# Patient Record
Sex: FEMALE | Race: WHITE | NOT HISPANIC OR LATINO | Employment: OTHER | URBAN - METROPOLITAN AREA
[De-identification: names, ages, dates, MRNs, and addresses within clinical notes are randomized per-mention and may not be internally consistent; named-entity substitution may affect disease eponyms.]

---

## 2017-09-07 ENCOUNTER — TRANSCRIBE ORDERS (OUTPATIENT)
Dept: ADMINISTRATIVE | Facility: HOSPITAL | Age: 62
End: 2017-09-07

## 2017-09-07 DIAGNOSIS — Z13.820 SCREENING FOR OSTEOPOROSIS: Primary | ICD-10-CM

## 2017-09-26 ENCOUNTER — HOSPITAL ENCOUNTER (OUTPATIENT)
Dept: BONE DENSITY | Facility: HOSPITAL | Age: 62
Discharge: HOME | End: 2017-09-26
Payer: COMMERCIAL

## 2017-09-26 DIAGNOSIS — Z13.820 SCREENING FOR OSTEOPOROSIS: ICD-10-CM

## 2017-09-26 PROCEDURE — 77080 DXA BONE DENSITY AXIAL: CPT

## 2017-10-23 ENCOUNTER — TRANSCRIBE ORDERS (OUTPATIENT)
Dept: ADMINISTRATIVE | Facility: HOSPITAL | Age: 62
End: 2017-10-23

## 2017-10-23 DIAGNOSIS — Z12.39 SCREENING BREAST EXAMINATION: Primary | ICD-10-CM

## 2017-11-13 ENCOUNTER — HOSPITAL ENCOUNTER (OUTPATIENT)
Dept: MAMMOGRAPHY | Facility: HOSPITAL | Age: 62
Discharge: HOME | End: 2017-11-13
Payer: COMMERCIAL

## 2017-11-13 DIAGNOSIS — Z12.39 SCREENING BREAST EXAMINATION: ICD-10-CM

## 2017-11-13 PROCEDURE — G0202 SCR MAMMO BI INCL CAD: HCPCS

## 2017-11-13 PROCEDURE — 77063 BREAST TOMOSYNTHESIS BI: CPT

## 2017-11-26 ENCOUNTER — APPOINTMENT (OUTPATIENT)
Dept: LAB | Facility: HOSPITAL | Age: 62
End: 2017-11-26
Payer: COMMERCIAL

## 2017-11-26 ENCOUNTER — TRANSCRIBE ORDERS (OUTPATIENT)
Dept: ADMINISTRATIVE | Facility: HOSPITAL | Age: 62
End: 2017-11-26

## 2017-11-26 DIAGNOSIS — E78.00 PURE HYPERCHOLESTEROLEMIA: ICD-10-CM

## 2017-11-26 LAB
ALBUMIN SERPL BCP-MCNC: 3.8 G/DL (ref 3.5–5)
ANION GAP SERPL CALCULATED.3IONS-SCNC: 11 MMOL/L (ref 4–13)
BUN SERPL-MCNC: 15 MG/DL (ref 5–25)
CALCIUM SERPL-MCNC: 9.1 MG/DL (ref 8.3–10.1)
CHLORIDE SERPL-SCNC: 101 MMOL/L (ref 100–108)
CHOLEST SERPL-MCNC: 234 MG/DL (ref 50–200)
CO2 SERPL-SCNC: 28 MMOL/L (ref 21–32)
CREAT SERPL-MCNC: 0.71 MG/DL (ref 0.6–1.3)
GFR SERPL CREATININE-BSD FRML MDRD: 92 ML/MIN/1.73SQ M
GLUCOSE P FAST SERPL-MCNC: 97 MG/DL (ref 65–99)
HDLC SERPL-MCNC: 42 MG/DL (ref 40–60)
LDLC SERPL CALC-MCNC: 131 MG/DL (ref 0–100)
PHOSPHATE SERPL-MCNC: 3.9 MG/DL (ref 2.3–4.1)
POTASSIUM SERPL-SCNC: 4.1 MMOL/L (ref 3.5–5.3)
SODIUM SERPL-SCNC: 140 MMOL/L (ref 136–145)
TRIGL SERPL-MCNC: 307 MG/DL

## 2017-11-26 PROCEDURE — 36415 COLL VENOUS BLD VENIPUNCTURE: CPT

## 2017-11-26 PROCEDURE — 80069 RENAL FUNCTION PANEL: CPT

## 2017-11-26 PROCEDURE — 80061 LIPID PANEL: CPT

## 2018-11-12 ENCOUNTER — TRANSCRIBE ORDERS (OUTPATIENT)
Dept: ADMINISTRATIVE | Facility: HOSPITAL | Age: 63
End: 2018-11-12

## 2018-11-12 DIAGNOSIS — Z12.39 BREAST SCREENING: Primary | ICD-10-CM

## 2018-11-29 ENCOUNTER — HOSPITAL ENCOUNTER (OUTPATIENT)
Dept: MAMMOGRAPHY | Facility: HOSPITAL | Age: 63
Discharge: HOME | End: 2018-11-29
Payer: COMMERCIAL

## 2018-11-29 DIAGNOSIS — Z12.39 BREAST SCREENING: ICD-10-CM

## 2018-11-29 PROCEDURE — 77063 BREAST TOMOSYNTHESIS BI: CPT

## 2018-11-29 PROCEDURE — 77067 SCR MAMMO BI INCL CAD: CPT

## 2018-12-21 ENCOUNTER — APPOINTMENT (OUTPATIENT)
Dept: LAB | Facility: CLINIC | Age: 63
End: 2018-12-21
Payer: COMMERCIAL

## 2018-12-21 ENCOUNTER — TRANSCRIBE ORDERS (OUTPATIENT)
Dept: LAB | Facility: CLINIC | Age: 63
End: 2018-12-21

## 2018-12-21 DIAGNOSIS — Z00.00 ROUTINE GENERAL MEDICAL EXAMINATION AT A HEALTH CARE FACILITY: Primary | ICD-10-CM

## 2018-12-21 DIAGNOSIS — Z00.00 ROUTINE GENERAL MEDICAL EXAMINATION AT A HEALTH CARE FACILITY: ICD-10-CM

## 2018-12-21 LAB
ALBUMIN SERPL BCP-MCNC: 3.5 G/DL (ref 3.5–5)
ANION GAP SERPL CALCULATED.3IONS-SCNC: 7 MMOL/L (ref 4–13)
BUN SERPL-MCNC: 15 MG/DL (ref 5–25)
CALCIUM SERPL-MCNC: 8.7 MG/DL (ref 8.3–10.1)
CHLORIDE SERPL-SCNC: 102 MMOL/L (ref 100–108)
CO2 SERPL-SCNC: 22 MMOL/L (ref 21–32)
CREAT SERPL-MCNC: 0.68 MG/DL (ref 0.6–1.3)
GFR SERPL CREATININE-BSD FRML MDRD: 94 ML/MIN/1.73SQ M
GLUCOSE P FAST SERPL-MCNC: 97 MG/DL (ref 65–99)
PHOSPHATE SERPL-MCNC: 4.2 MG/DL (ref 2.3–4.1)
POTASSIUM SERPL-SCNC: 5.2 MMOL/L (ref 3.5–5.3)
SODIUM SERPL-SCNC: 131 MMOL/L (ref 136–145)

## 2018-12-21 PROCEDURE — 80069 RENAL FUNCTION PANEL: CPT

## 2018-12-21 PROCEDURE — 36415 COLL VENOUS BLD VENIPUNCTURE: CPT

## 2018-12-24 ENCOUNTER — TRANSCRIBE ORDERS (OUTPATIENT)
Dept: LAB | Facility: CLINIC | Age: 63
End: 2018-12-24

## 2018-12-24 ENCOUNTER — APPOINTMENT (OUTPATIENT)
Dept: LAB | Facility: CLINIC | Age: 63
End: 2018-12-24
Payer: COMMERCIAL

## 2018-12-24 DIAGNOSIS — Z00.00 ROUTINE GENERAL MEDICAL EXAMINATION AT A HEALTH CARE FACILITY: Primary | ICD-10-CM

## 2018-12-24 DIAGNOSIS — Z00.00 ROUTINE GENERAL MEDICAL EXAMINATION AT A HEALTH CARE FACILITY: ICD-10-CM

## 2018-12-24 PROCEDURE — 36415 COLL VENOUS BLD VENIPUNCTURE: CPT

## 2018-12-24 PROCEDURE — 80061 LIPID PANEL: CPT

## 2018-12-26 LAB
CHOLEST SERPL-MCNC: 264 MG/DL (ref 50–200)
HDLC SERPL-MCNC: 38 MG/DL (ref 40–60)
LDLC SERPL CALC-MCNC: 161 MG/DL (ref 0–100)
NONHDLC SERPL-MCNC: 226 MG/DL
TRIGL SERPL-MCNC: 326 MG/DL

## 2019-07-10 ENCOUNTER — TELEPHONE (OUTPATIENT)
Dept: FAMILY MEDICINE CLINIC | Facility: CLINIC | Age: 64
End: 2019-07-10

## 2019-07-14 DIAGNOSIS — F33.42 RECURRENT MAJOR DEPRESSIVE DISORDER, IN FULL REMISSION (HCC): Primary | ICD-10-CM

## 2019-07-14 RX ORDER — CITALOPRAM 20 MG/1
20 TABLET ORAL DAILY
Qty: 30 TABLET | Refills: 5 | Status: SHIPPED | OUTPATIENT
Start: 2019-07-14 | End: 2020-01-02 | Stop reason: SDUPTHER

## 2019-08-02 RX ORDER — HYDROXYZINE 50 MG/1
1 TABLET, FILM COATED ORAL DAILY
COMMUNITY
Start: 2019-07-03 | End: 2020-04-20 | Stop reason: ALTCHOICE

## 2019-08-02 RX ORDER — LOSARTAN POTASSIUM 100 MG/1
1 TABLET ORAL DAILY
COMMUNITY
Start: 2019-07-03 | End: 2019-11-25 | Stop reason: SDUPTHER

## 2019-08-02 RX ORDER — ATORVASTATIN CALCIUM 10 MG/1
1 TABLET, FILM COATED ORAL DAILY
COMMUNITY
Start: 2019-06-04 | End: 2019-08-07 | Stop reason: ALTCHOICE

## 2019-08-02 RX ORDER — TRIAMTERENE AND HYDROCHLOROTHIAZIDE 75; 50 MG/1; MG/1
1 TABLET ORAL DAILY
COMMUNITY
Start: 2019-07-03 | End: 2019-08-07 | Stop reason: SDUPTHER

## 2019-08-07 ENCOUNTER — OFFICE VISIT (OUTPATIENT)
Dept: FAMILY MEDICINE CLINIC | Facility: CLINIC | Age: 64
End: 2019-08-07
Payer: COMMERCIAL

## 2019-08-07 VITALS
SYSTOLIC BLOOD PRESSURE: 124 MMHG | DIASTOLIC BLOOD PRESSURE: 74 MMHG | WEIGHT: 198 LBS | BODY MASS INDEX: 35.08 KG/M2 | HEIGHT: 63 IN

## 2019-08-07 DIAGNOSIS — J30.1 SEASONAL ALLERGIC RHINITIS DUE TO POLLEN: Chronic | ICD-10-CM

## 2019-08-07 DIAGNOSIS — I10 ESSENTIAL HYPERTENSION: ICD-10-CM

## 2019-08-07 DIAGNOSIS — N39.3 STRESS INCONTINENCE OF URINE: Chronic | ICD-10-CM

## 2019-08-07 DIAGNOSIS — E78.00 HYPERCHOLESTEROLEMIA: Primary | ICD-10-CM

## 2019-08-07 DIAGNOSIS — K21.00 GASTRO-ESOPHAGEAL REFLUX DISEASE WITH ESOPHAGITIS: Chronic | ICD-10-CM

## 2019-08-07 DIAGNOSIS — F41.1 GENERALIZED ANXIETY DISORDER: Chronic | ICD-10-CM

## 2019-08-07 PROCEDURE — 3074F SYST BP LT 130 MM HG: CPT | Performed by: FAMILY MEDICINE

## 2019-08-07 PROCEDURE — 99214 OFFICE O/P EST MOD 30 MIN: CPT | Performed by: FAMILY MEDICINE

## 2019-08-07 PROCEDURE — 3008F BODY MASS INDEX DOCD: CPT | Performed by: FAMILY MEDICINE

## 2019-08-07 RX ORDER — ATORVASTATIN CALCIUM 10 MG/1
10 TABLET, FILM COATED ORAL DAILY
COMMUNITY
End: 2019-08-07 | Stop reason: SDUPTHER

## 2019-08-07 RX ORDER — ATORVASTATIN CALCIUM 10 MG/1
10 TABLET, FILM COATED ORAL DAILY
Qty: 30 TABLET | Refills: 5 | Status: SHIPPED | OUTPATIENT
Start: 2019-08-07 | End: 2020-04-20 | Stop reason: ALTCHOICE

## 2019-08-07 RX ORDER — TRIAMTERENE AND HYDROCHLOROTHIAZIDE 75; 50 MG/1; MG/1
1 TABLET ORAL DAILY
Qty: 30 TABLET | Refills: 5 | Status: SHIPPED | OUTPATIENT
Start: 2019-08-07 | End: 2020-04-01 | Stop reason: SDUPTHER

## 2019-08-07 NOTE — PATIENT INSTRUCTIONS
Overall seems to be doing well has been concerned because is now starting to get some macular degeneration although it does not seem to be having much effect on her eyes  Patient is going to restart the Lipitor and may continue the fish oil which may help the triglycerides  Will continue all meds as is and recheck here in 4 months

## 2019-08-07 NOTE — PROGRESS NOTES
Assessment/Plan:    No problem-specific Assessment & Plan notes found for this encounter  Diagnoses and all orders for this visit:    Hypercholesterolemia  Comments:  Reviewed labs levels are up should restart the Lipitor and may continue the fish oil if desired will need to repeat labs after next visit  Orders:  -     atorvastatin (LIPITOR) 10 mg tablet; Take 1 tablet (10 mg total) by mouth daily    Essential hypertension  Comments:  Doing well continue current regimen watch salt in diet and push for weight loss  Orders:  -     triamterene-hydrochlorothiazide (MAXZIDE) 75-50 MG per tablet; Take 1 tablet by mouth daily    Seasonal allergic rhinitis due to pollen  Comments:  Doing well continue p r n  Meds    Stress incontinence of urine  Comments:  Doing well with use of the Kegel exercises  Should continue on a daily basis    Generalized anxiety disorder  Comments:  Overall doing well continue current regimen    Gastro-esophageal reflux disease with esophagitis  Comments:  Doing well continue p r n  Meds    Other orders  -     Discontinue: atorvastatin (LIPITOR) 10 mg tablet; Take 1 tablet by mouth daily  -     hydrOXYzine HCL (ATARAX) 50 mg tablet; Take 1 tablet by mouth daily  -     losartan (COZAAR) 100 MG tablet; Take 1 tablet by mouth daily  -     Discontinue: triamterene-hydrochlorothiazide (MAXZIDE) 75-50 MG per tablet; Take 1 tablet by mouth daily  -     Discontinue: atorvastatin (LIPITOR) 10 mg tablet; Take 10 mg by mouth daily          Subjective:      Patient ID: Dalia Bal is a 62 y o  female  Patient has history of hypertension, reflux esophagitis, allergic rhinitis, urinary incontinence, stress disorder and hypercholesterolemia  Has not been taking her cholesterol medication but has been taking fish oil  Overall the stresses been doing well and is doing well with urinary incontinence    No trouble with heartburn allergies doing okay      The following portions of the patient's history were reviewed and updated as appropriate: allergies, current medications, past family history, past medical history, past social history, past surgical history and problem list     Review of Systems   Constitutional: Negative for activity change, appetite change, chills, fatigue, fever and unexpected weight change  HENT: Positive for congestion  Negative for dental problem, hearing loss, rhinorrhea, trouble swallowing and voice change  Eyes: Negative for visual disturbance  Respiratory: Negative for apnea, cough, chest tightness and shortness of breath  Cardiovascular: Negative for chest pain, palpitations and leg swelling  Gastrointestinal: Negative for abdominal distention, abdominal pain, constipation and diarrhea  Endocrine: Negative for polyuria  Genitourinary: Negative for difficulty urinating and enuresis  Musculoskeletal: Negative for arthralgias and myalgias  Skin: Negative for rash  Allergic/Immunologic: Positive for environmental allergies  Neurological: Negative for dizziness, weakness, light-headedness, numbness and headaches  Hematological: Negative for adenopathy  Psychiatric/Behavioral: Negative for agitation and confusion  Objective:      /74 (BP Location: Left arm, Patient Position: Sitting, Cuff Size: Large)   Ht 5' 3" (1 6 m)   Wt 89 8 kg (198 lb)   BMI 35 07 kg/m²          Physical Exam   Constitutional: She appears well-developed and well-nourished  HENT:   Head: Normocephalic  Nose: Nose normal    Mouth/Throat: Oropharynx is clear and moist    Eyes: Conjunctivae are normal    Neck: Neck supple  No thyromegaly present  Cardiovascular: Normal rate, regular rhythm and normal heart sounds  No murmur heard  Pulmonary/Chest: Effort normal and breath sounds normal    Abdominal: Soft  She exhibits no distension and no mass  There is no tenderness  Musculoskeletal: She exhibits no edema  Lymphadenopathy:     She has no cervical adenopathy  Neurological: She is alert  She displays normal reflexes  Coordination normal    Skin: Skin is warm and dry  Psychiatric: She has a normal mood and affect  Nursing note and vitals reviewed

## 2019-08-08 PROBLEM — N39.3 STRESS INCONTINENCE OF URINE: Chronic | Status: ACTIVE | Noted: 2019-08-08

## 2019-08-08 PROBLEM — E78.00 HYPERCHOLESTEROLEMIA: Chronic | Status: ACTIVE | Noted: 2019-08-08

## 2019-08-08 PROBLEM — F41.1 GENERALIZED ANXIETY DISORDER: Chronic | Status: ACTIVE | Noted: 2019-08-08

## 2019-08-08 PROBLEM — I10 ESSENTIAL HYPERTENSION: Chronic | Status: ACTIVE | Noted: 2019-08-08

## 2019-08-08 PROBLEM — K21.00 GASTRO-ESOPHAGEAL REFLUX DISEASE WITH ESOPHAGITIS: Chronic | Status: ACTIVE | Noted: 2019-08-08

## 2019-08-08 PROBLEM — J30.1 SEASONAL ALLERGIC RHINITIS DUE TO POLLEN: Chronic | Status: ACTIVE | Noted: 2019-08-08

## 2019-09-10 ENCOUNTER — TRANSCRIBE ORDERS (OUTPATIENT)
Dept: ADMINISTRATIVE | Facility: HOSPITAL | Age: 64
End: 2019-09-10

## 2019-09-10 DIAGNOSIS — Z12.31 SCREENING MAMMOGRAM FOR HIGH-RISK PATIENT: Primary | ICD-10-CM

## 2019-11-03 ENCOUNTER — APPOINTMENT (EMERGENCY)
Dept: RADIOLOGY | Facility: HOSPITAL | Age: 64
End: 2019-11-03
Payer: COMMERCIAL

## 2019-11-03 ENCOUNTER — HOSPITAL ENCOUNTER (OUTPATIENT)
Facility: HOSPITAL | Age: 64
Setting detail: OBSERVATION
Discharge: HOME | End: 2019-11-04
Attending: EMERGENCY MEDICINE | Admitting: FAMILY MEDICINE
Payer: COMMERCIAL

## 2019-11-03 ENCOUNTER — APPOINTMENT (EMERGENCY)
Dept: CT IMAGING | Facility: HOSPITAL | Age: 64
End: 2019-11-03
Payer: COMMERCIAL

## 2019-11-03 DIAGNOSIS — R26.2 AMBULATORY DYSFUNCTION: Primary | ICD-10-CM

## 2019-11-03 DIAGNOSIS — M25.552 ACUTE HIP PAIN, LEFT: ICD-10-CM

## 2019-11-03 DIAGNOSIS — R10.819 ABDOMINAL TENDERNESS: ICD-10-CM

## 2019-11-03 LAB
ALBUMIN SERPL BCP-MCNC: 3.8 G/DL (ref 3.5–5)
ALP SERPL-CCNC: 89 U/L (ref 46–116)
ALT SERPL W P-5'-P-CCNC: 39 U/L (ref 12–78)
ANION GAP SERPL CALCULATED.3IONS-SCNC: 10 MMOL/L (ref 4–13)
AST SERPL W P-5'-P-CCNC: 20 U/L (ref 5–45)
BASOPHILS # BLD AUTO: 0.12 THOUSANDS/ΜL (ref 0–0.1)
BASOPHILS NFR BLD AUTO: 1 % (ref 0–1)
BILIRUB SERPL-MCNC: 0.5 MG/DL (ref 0.2–1)
BILIRUB UR QL STRIP: NEGATIVE
BUN SERPL-MCNC: 14 MG/DL (ref 5–25)
CALCIUM SERPL-MCNC: 8.7 MG/DL (ref 8.3–10.1)
CHLORIDE SERPL-SCNC: 100 MMOL/L (ref 100–108)
CLARITY UR: CLEAR
CO2 SERPL-SCNC: 27 MMOL/L (ref 21–32)
COLOR UR: YELLOW
CREAT SERPL-MCNC: 0.77 MG/DL (ref 0.6–1.3)
EOSINOPHIL # BLD AUTO: 0.29 THOUSAND/ΜL (ref 0–0.61)
EOSINOPHIL NFR BLD AUTO: 2 % (ref 0–6)
ERYTHROCYTE [DISTWIDTH] IN BLOOD BY AUTOMATED COUNT: 13.9 % (ref 11.6–15.1)
GFR SERPL CREATININE-BSD FRML MDRD: 82 ML/MIN/1.73SQ M
GLUCOSE SERPL-MCNC: 115 MG/DL (ref 65–140)
GLUCOSE UR STRIP-MCNC: NEGATIVE MG/DL
HCT VFR BLD AUTO: 39.3 % (ref 34.8–46.1)
HGB BLD-MCNC: 13.1 G/DL (ref 11.5–15.4)
HGB UR QL STRIP.AUTO: NEGATIVE
IMM GRANULOCYTES # BLD AUTO: 0.06 THOUSAND/UL (ref 0–0.2)
IMM GRANULOCYTES NFR BLD AUTO: 1 % (ref 0–2)
KETONES UR STRIP-MCNC: NEGATIVE MG/DL
LEUKOCYTE ESTERASE UR QL STRIP: NEGATIVE
LIPASE SERPL-CCNC: 110 U/L (ref 73–393)
LYMPHOCYTES # BLD AUTO: 2.78 THOUSANDS/ΜL (ref 0.6–4.47)
LYMPHOCYTES NFR BLD AUTO: 23 % (ref 14–44)
MCH RBC QN AUTO: 29.6 PG (ref 26.8–34.3)
MCHC RBC AUTO-ENTMCNC: 33.3 G/DL (ref 31.4–37.4)
MCV RBC AUTO: 89 FL (ref 82–98)
MONOCYTES # BLD AUTO: 0.55 THOUSAND/ΜL (ref 0.17–1.22)
MONOCYTES NFR BLD AUTO: 4 % (ref 4–12)
NEUTROPHILS # BLD AUTO: 8.57 THOUSANDS/ΜL (ref 1.85–7.62)
NEUTS SEG NFR BLD AUTO: 69 % (ref 43–75)
NITRITE UR QL STRIP: NEGATIVE
NRBC BLD AUTO-RTO: 0 /100 WBCS
PH UR STRIP.AUTO: 6.5 [PH]
PLATELET # BLD AUTO: 351 THOUSANDS/UL (ref 149–390)
PLATELET # BLD AUTO: 358 THOUSANDS/UL (ref 149–390)
PMV BLD AUTO: 10.4 FL (ref 8.9–12.7)
PMV BLD AUTO: 10.6 FL (ref 8.9–12.7)
POTASSIUM SERPL-SCNC: 3.5 MMOL/L (ref 3.5–5.3)
PROT SERPL-MCNC: 7.5 G/DL (ref 6.4–8.2)
PROT UR STRIP-MCNC: NEGATIVE MG/DL
RBC # BLD AUTO: 4.43 MILLION/UL (ref 3.81–5.12)
SODIUM SERPL-SCNC: 137 MMOL/L (ref 136–145)
SP GR UR STRIP.AUTO: <=1.005 (ref 1–1.03)
TROPONIN I SERPL-MCNC: <0.02 NG/ML
UROBILINOGEN UR QL STRIP.AUTO: 0.2 E.U./DL
WBC # BLD AUTO: 12.37 THOUSAND/UL (ref 4.31–10.16)

## 2019-11-03 PROCEDURE — 96376 TX/PRO/DX INJ SAME DRUG ADON: CPT

## 2019-11-03 PROCEDURE — 99219 PR INITIAL OBSERVATION CARE/DAY 50 MINUTES: CPT | Performed by: NURSE PRACTITIONER

## 2019-11-03 PROCEDURE — 51798 US URINE CAPACITY MEASURE: CPT

## 2019-11-03 PROCEDURE — 84484 ASSAY OF TROPONIN QUANT: CPT | Performed by: EMERGENCY MEDICINE

## 2019-11-03 PROCEDURE — 80053 COMPREHEN METABOLIC PANEL: CPT | Performed by: EMERGENCY MEDICINE

## 2019-11-03 PROCEDURE — 96375 TX/PRO/DX INJ NEW DRUG ADDON: CPT

## 2019-11-03 PROCEDURE — 36415 COLL VENOUS BLD VENIPUNCTURE: CPT | Performed by: EMERGENCY MEDICINE

## 2019-11-03 PROCEDURE — 93005 ELECTROCARDIOGRAM TRACING: CPT

## 2019-11-03 PROCEDURE — 99285 EMERGENCY DEPT VISIT HI MDM: CPT | Performed by: EMERGENCY MEDICINE

## 2019-11-03 PROCEDURE — 99285 EMERGENCY DEPT VISIT HI MDM: CPT

## 2019-11-03 PROCEDURE — 85025 COMPLETE CBC W/AUTO DIFF WBC: CPT | Performed by: EMERGENCY MEDICINE

## 2019-11-03 PROCEDURE — 85049 AUTOMATED PLATELET COUNT: CPT | Performed by: NURSE PRACTITIONER

## 2019-11-03 PROCEDURE — 71045 X-RAY EXAM CHEST 1 VIEW: CPT

## 2019-11-03 PROCEDURE — 83690 ASSAY OF LIPASE: CPT | Performed by: EMERGENCY MEDICINE

## 2019-11-03 PROCEDURE — 81003 URINALYSIS AUTO W/O SCOPE: CPT | Performed by: EMERGENCY MEDICINE

## 2019-11-03 PROCEDURE — 96374 THER/PROPH/DIAG INJ IV PUSH: CPT

## 2019-11-03 PROCEDURE — 74177 CT ABD & PELVIS W/CONTRAST: CPT

## 2019-11-03 RX ORDER — DIPHENOXYLATE HYDROCHLORIDE AND ATROPINE SULFATE 2.5; .025 MG/1; MG/1
1 TABLET ORAL DAILY
COMMUNITY

## 2019-11-03 RX ORDER — NICOTINE 21 MG/24HR
1 PATCH, TRANSDERMAL 24 HOURS TRANSDERMAL DAILY
Status: DISCONTINUED | OUTPATIENT
Start: 2019-11-03 | End: 2019-11-04 | Stop reason: HOSPADM

## 2019-11-03 RX ORDER — HYDROMORPHONE HCL/PF 1 MG/ML
0.5 SYRINGE (ML) INJECTION EVERY 4 HOURS PRN
Status: DISCONTINUED | OUTPATIENT
Start: 2019-11-03 | End: 2019-11-04 | Stop reason: HOSPADM

## 2019-11-03 RX ORDER — FENTANYL CITRATE 50 UG/ML
50 INJECTION, SOLUTION INTRAMUSCULAR; INTRAVENOUS ONCE
Status: COMPLETED | OUTPATIENT
Start: 2019-11-03 | End: 2019-11-03

## 2019-11-03 RX ORDER — LOSARTAN POTASSIUM 50 MG/1
100 TABLET ORAL DAILY
Status: DISCONTINUED | OUTPATIENT
Start: 2019-11-03 | End: 2019-11-04 | Stop reason: HOSPADM

## 2019-11-03 RX ORDER — TRIAMTERENE AND HYDROCHLOROTHIAZIDE 37.5; 25 MG/1; MG/1
2 TABLET ORAL DAILY
Status: DISCONTINUED | OUTPATIENT
Start: 2019-11-03 | End: 2019-11-04 | Stop reason: HOSPADM

## 2019-11-03 RX ORDER — KETOROLAC TROMETHAMINE 30 MG/ML
15 INJECTION, SOLUTION INTRAMUSCULAR; INTRAVENOUS EVERY 6 HOURS SCHEDULED
Status: COMPLETED | OUTPATIENT
Start: 2019-11-03 | End: 2019-11-04

## 2019-11-03 RX ORDER — LIDOCAINE 50 MG/G
1 PATCH TOPICAL ONCE
Status: COMPLETED | OUTPATIENT
Start: 2019-11-03 | End: 2019-11-03

## 2019-11-03 RX ORDER — ACETAMINOPHEN 325 MG/1
650 TABLET ORAL ONCE
Status: COMPLETED | OUTPATIENT
Start: 2019-11-03 | End: 2019-11-03

## 2019-11-03 RX ORDER — BACLOFEN 10 MG/1
5 TABLET ORAL 3 TIMES DAILY
Status: DISCONTINUED | OUTPATIENT
Start: 2019-11-03 | End: 2019-11-04 | Stop reason: HOSPADM

## 2019-11-03 RX ORDER — CITALOPRAM 20 MG/1
20 TABLET ORAL DAILY
Status: DISCONTINUED | OUTPATIENT
Start: 2019-11-03 | End: 2019-11-04 | Stop reason: HOSPADM

## 2019-11-03 RX ORDER — KETOROLAC TROMETHAMINE 30 MG/ML
15 INJECTION, SOLUTION INTRAMUSCULAR; INTRAVENOUS ONCE
Status: COMPLETED | OUTPATIENT
Start: 2019-11-03 | End: 2019-11-03

## 2019-11-03 RX ORDER — LIDOCAINE HYDROCHLORIDE 10 MG/ML
5 INJECTION, SOLUTION EPIDURAL; INFILTRATION; INTRACAUDAL; PERINEURAL ONCE
Status: COMPLETED | OUTPATIENT
Start: 2019-11-03 | End: 2019-11-03

## 2019-11-03 RX ORDER — ECHINACEA PURPUREA EXTRACT 125 MG
1 TABLET ORAL AS NEEDED
COMMUNITY

## 2019-11-03 RX ORDER — OMEPRAZOLE 20 MG/1
20 TABLET, DELAYED RELEASE ORAL DAILY
COMMUNITY

## 2019-11-03 RX ADMIN — KETOROLAC TROMETHAMINE 15 MG: 30 INJECTION, SOLUTION INTRAMUSCULAR at 08:12

## 2019-11-03 RX ADMIN — MORPHINE SULFATE 1 MG: 2 INJECTION, SOLUTION INTRAMUSCULAR; INTRAVENOUS at 10:27

## 2019-11-03 RX ADMIN — CITALOPRAM HYDROBROMIDE 20 MG: 20 TABLET ORAL at 10:30

## 2019-11-03 RX ADMIN — ENOXAPARIN SODIUM 40 MG: 40 INJECTION SUBCUTANEOUS at 10:31

## 2019-11-03 RX ADMIN — FENTANYL CITRATE 50 MCG: 50 INJECTION INTRAMUSCULAR; INTRAVENOUS at 06:59

## 2019-11-03 RX ADMIN — BACLOFEN 5 MG: 10 TABLET ORAL at 08:12

## 2019-11-03 RX ADMIN — ACETAMINOPHEN 650 MG: 325 TABLET, FILM COATED ORAL at 07:03

## 2019-11-03 RX ADMIN — IOHEXOL 100 ML: 350 INJECTION, SOLUTION INTRAVENOUS at 05:37

## 2019-11-03 RX ADMIN — KETOROLAC TROMETHAMINE 15 MG: 30 INJECTION, SOLUTION INTRAMUSCULAR at 13:08

## 2019-11-03 RX ADMIN — KETOROLAC TROMETHAMINE 15 MG: 30 INJECTION, SOLUTION INTRAMUSCULAR at 17:03

## 2019-11-03 RX ADMIN — LOSARTAN POTASSIUM 100 MG: 50 TABLET, FILM COATED ORAL at 10:30

## 2019-11-03 RX ADMIN — BACLOFEN 5 MG: 10 TABLET ORAL at 15:46

## 2019-11-03 RX ADMIN — LIDOCAINE HYDROCHLORIDE 5 ML: 10 INJECTION, SOLUTION EPIDURAL; INFILTRATION; INTRACAUDAL; PERINEURAL at 07:05

## 2019-11-03 RX ADMIN — TRIAMTERENE AND HYDROCHLOROTHIAZIDE 2 TABLET: 37.5; 25 TABLET ORAL at 10:30

## 2019-11-03 RX ADMIN — HYDROMORPHONE HYDROCHLORIDE 0.5 MG: 1 INJECTION, SOLUTION INTRAMUSCULAR; INTRAVENOUS; SUBCUTANEOUS at 20:03

## 2019-11-03 RX ADMIN — HYDROMORPHONE HYDROCHLORIDE 0.5 MG: 1 INJECTION, SOLUTION INTRAMUSCULAR; INTRAVENOUS; SUBCUTANEOUS at 14:20

## 2019-11-03 RX ADMIN — KETOROLAC TROMETHAMINE 15 MG: 30 INJECTION, SOLUTION INTRAMUSCULAR at 07:02

## 2019-11-03 RX ADMIN — FENTANYL CITRATE 50 MCG: 50 INJECTION INTRAMUSCULAR; INTRAVENOUS at 04:37

## 2019-11-03 RX ADMIN — BACLOFEN 5 MG: 10 TABLET ORAL at 21:16

## 2019-11-03 RX ADMIN — LIDOCAINE 1 PATCH: 50 PATCH TOPICAL at 07:51

## 2019-11-03 NOTE — ASSESSMENT & PLAN NOTE
Blood pressure currently stable  Admit to med surge  Monitor per protocol  Continue prior to admission medication

## 2019-11-03 NOTE — PROGRESS NOTES
Progress Note - Rodrick Glez 1955, 62 y o  female MRN: [id number]    Unit/Bed#: [facility] Encounter: [id number]    Primary Care Provider: Jovanny Roca MD   Date and time admitted to hospital: 11/3/2019  4:07 AM        Ambulatory dysfunction  Assessment & Plan  Patient reports increased weakness and left lower extremity  Patient admits to pain in lumbar region left hip  Unable to fully assess due to acute pain  PT/OT consulted  Ambulate with assist at all times    Acute hip pain, left  Assessment & Plan  Pain to left trochanter region upon palpation  Limited range of motion most likely secondary to acute pain  PT/OT consult  One time baclofen,Toradol  PT and OT consulted  Ambulate with assist at all times    Essential hypertension  Assessment & Plan  Blood pressure currently stable  Admit to med surge  Monitor per protocol  Continue prior to admission medication        VTE Prophylaxis: Enoxaparin (Lovenox)  / sequential compression device   Code Status: FULL  POLST: POLST is not applicable to this patient    Anticipated Length of Stay:  Patient will be admitted on an Observation basis with an anticipated length of stay of  Less than 2 midnights  Justification for Hospital Stay: acute left hip pain and ambulatory dysfunction     Total Time for Visit, including Counseling / Coordination of Care: 45 minutes  Greater than 50% of this total time spent on direct patient counseling and coordination of care  Chief Complaint:   Ambulatory dysfunction and acute right hip pain     History of Present Illness:    Rodrick Glez is a 62 y o  female who presented to the emergency room for evaluation of ambulatory dysfunction acute pain of left lower extremity and lower back  Patient has a significant past medical history including hypertension allergies bladder disorder  Patient reports fall in her yard approximately 3 weeks ago    States she was unable to sleep last night due to left lower extremity pain including hip and left lumbar region  States she was an avid ambulate without the assistance of her mother's walker  Images and labs were collected emergency room-see below  Patient was admitted for observation  PT will be consulted OT will be consult, provide analgesia as needed  Review of Systems:    Review of Systems   Musculoskeletal: Positive for back pain and gait problem  Left hip pain   Neurological: Positive for weakness ( left lower extremity)  All other systems reviewed and are negative  Past Medical and Surgical History:     Past Medical History:   Diagnosis Date    Allergic rhinitis     Functional disorder of bladder        Past Surgical History:   Procedure Laterality Date    HYSTERECTOMY  04/23/2019    total       Meds/Allergies:    Prior to Admission medications    Medication Sig Start Date End Date Taking? Authorizing Provider   atorvastatin (LIPITOR) 10 mg tablet Take 1 tablet (10 mg total) by mouth daily 8/7/19  Yes Steven Mckeon MD   citalopram (CeleXA) 20 mg tablet Take 1 tablet (20 mg total) by mouth daily 7/14/19  Yes Steven Mckeon MD   hydrOXYzine HCL (ATARAX) 50 mg tablet Take 1 tablet by mouth daily 7/3/19  Yes Historical Provider, MD   losartan (COZAAR) 100 MG tablet Take 1 tablet by mouth daily 7/3/19  Yes Historical Provider, MD   triamterene-hydrochlorothiazide (MAXZIDE) 75-50 MG per tablet Take 1 tablet by mouth daily 8/7/19  Yes Steven Mckeon MD     I have reviewed home medications with patient personally  Allergies: No Known Allergies    Social History:     Marital Status:     Occupation: marketing   Patient Pre-hospital Living Situation: independent  Patient Pre-hospital Level of Mobility: limited  Patient Pre-hospital Diet Restrictions: denies  Substance Use History:   Social History     Substance and Sexual Activity   Alcohol Use Yes    Comment: Occasionally      Social History     Tobacco Use   Smoking Status Current Every Day Smoker    Packs/day: 1 00    Years: 42 00    Pack years: 42 00   Smokeless Tobacco Never Used     Social History     Substance and Sexual Activity   Drug Use Never       Family History:    Family History   Problem Relation Age of Onset    Breast cancer Maternal Aunt        Physical Exam:     Vitals:   Blood Pressure: 167/71 (11/03/19 0704)  Pulse: 84 (11/03/19 0704)  Temperature: (!) 96 7 °F (35 9 °C) (11/03/19 0412)  Temp Source: Temporal (11/03/19 0412)  Respirations: 20 (11/03/19 0704)  Height: 5' 3" (160 cm) (11/03/19 0412)  Weight - Scale: 90 9 kg (200 lb 6 4 oz) (11/03/19 0412)  SpO2: 95 % (11/03/19 0704)    Physical Exam   Constitutional: She is oriented to person, place, and time  She appears well-developed and well-nourished  No distress  HENT:   Head: Normocephalic and atraumatic  Eyes: Pupils are equal, round, and reactive to light  EOM are normal    Neck: Normal range of motion  Neck supple  Cardiovascular: Normal rate, regular rhythm, normal heart sounds and intact distal pulses  Exam reveals no gallop and no friction rub  No murmur heard  Pulmonary/Chest: Effort normal and breath sounds normal  No respiratory distress  Abdominal: Soft  Bowel sounds are normal  She exhibits no distension  Musculoskeletal: She exhibits no edema  Right shoulder: She exhibits decreased strength (Unable to fully assess due to acute pain left lower extremity)  Decreased range of motion: Left lower extremity most likely secondary to acute pain  Neurological: She is alert and oriented to person, place, and time  No cranial nerve deficit  Coordination normal    Skin: Skin is warm and dry  Capillary refill takes less than 2 seconds  No rash noted  She is not diaphoretic  No erythema  No pallor  Psychiatric: She has a normal mood and affect  Her behavior is normal  Judgment and thought content normal        Additional Data:     Lab Results: I have personally reviewed pertinent reports        Results from last 7 days   Lab Units 11/03/19  0432   WBC Thousand/uL 12 37*   HEMOGLOBIN g/dL 13 1   HEMATOCRIT % 39 3   PLATELETS Thousands/uL 358   NEUTROS PCT % 69   LYMPHS PCT % 23   MONOS PCT % 4   EOS PCT % 2     Results from last 7 days   Lab Units 11/03/19  0432   POTASSIUM mmol/L 3 5   CHLORIDE mmol/L 100   CO2 mmol/L 27   BUN mg/dL 14   CREATININE mg/dL 0 77   CALCIUM mg/dL 8 7   ALK PHOS U/L 89   ALT U/L 39   AST U/L 20           Imaging: I have personally reviewed pertinent reports  Ct Abdomen Pelvis With Contrast    Result Date: 11/3/2019  Narrative: CT ABDOMEN AND PELVIS WITH IV CONTRAST INDICATION:   pt presents with L lower back pain, L hip pain, hx of recent fall  Tender in LUQ on exam, hx of recent hysterectomy  Eval LUQ, as well as plevis and L hip for traumatic injury  COMPARISON:  None  TECHNIQUE:  CT examination of the abdomen and pelvis was performed  Axial, sagittal, and coronal 2D reformatted images were created from the source data and submitted for interpretation  Radiation dose length product (DLP) for this visit:  934 73 mGy/cm  This examination, like all CT scans performed in the [facility], was performed utilizing techniques to minimize radiation dose exposure, including the use of iterative reconstruction and automated exposure control  IV Contrast:  100 mL Omnipaque 350 Enteric Contrast:  Enteric contrast was not administered  FINDINGS: Study somewhat limited due to patient motion artifact and beam hardening artifact from body habitus and imaging patient with arms over her chest  ABDOMEN LOWER CHEST:  Dependent atelectasis in the lower lobes of the lungs bilaterally  LIVER/BILIARY TREE:  Mildly enlarged with fatty infiltrative changes  GALLBLADDER:  No calcified gallstones  No pericholecystic inflammatory change  SPLEEN:  Unremarkable  PANCREAS:  Unremarkable  ADRENAL GLANDS:  Unremarkable  KIDNEYS/URETERS:  Unremarkable  No hydronephrosis   STOMACH AND BOWEL:  Evaluation of the GI tract limited due to lack of oral contrast material  Stomach decompressed  Hiatal hernia  No evidence of small bowel obstruction  Large amount of feces throughout the colon and rectum, compatible with constipation and fecal impaction  APPENDIX:  No findings to suggest appendicitis  ABDOMINOPELVIC CAVITY:  No ascites or free intraperitoneal air  Prominent lymph nodes throughout the small bowel mesentery and inguinal regions bilaterally, left side greater than right  VESSELS:  Atherosclerotic changes are present  No evidence of aneurysm  PELVIS REPRODUCTIVE ORGANS:  Surgically absent  URINARY BLADDER:  Unremarkable  ABDOMINAL WALL/INGUINAL REGIONS:  Small umbilical hernia containing fat  OSSEOUS STRUCTURES:  Degenerative changes in the lumbar spine and bilateral hips  No acute fracture or destructive osseous lesion  Impression: 1  No traumatic solid or visceral organ injury  2   Constipation and fecal impaction  Workstation performed: [id number]       [facility] / [facility] Everywhere Records Reviewed: Yes     ** Please Note: This note has been constructed using a voice recognition system   **

## 2019-11-03 NOTE — H&P
H&P- Deandra Ceja 1955, 62 y o  female MRN: [id number]    Unit/Bed#: 415-01 Encounter: [id number]    Primary Care Provider: Meryl Mc MD   Date and time admitted to hospital: 11/3/2019  4:07 AM        Ambulatory dysfunction  Assessment & Plan  Patient reports increased weakness and left lower extremity  Patient admits to pain in lumbar region left hip  Unable to fully assess due to acute pain  PT/OT consulted  Ambulate with assist at all times    Acute hip pain, left  Assessment & Plan  Pain to left trochanter region upon palpation  Limited range of motion most likely secondary to acute pain  PT/OT consult  One time baclofen,Toradol  PT and OT consulted  Ambulate with assist at all times    Essential hypertension  Assessment & Plan  Blood pressure currently stable  Admit to med surge  Monitor per protocol  Continue prior to admission medication            VTE Prophylaxis: Enoxaparin (Lovenox)  / sequential compression device   Code Status: FULL  POLST: POLST is not applicable to this patient    Anticipated Length of Stay:  Patient will be admitted on an Observation basis with an anticipated length of stay of  Less than 2 midnights  Justification for Hospital Stay: acute left hip pain and ambulatory dysfunction     Total Time for Visit, including Counseling / Coordination of Care: 45 minutes  Greater than 50% of this total time spent on direct patient counseling and coordination of care  Chief Complaint:   Ambulatory dysfunction and acute right hip pain     History of Present Illness:    Deandra Ceja is a 62 y o  female who presented to the emergency room for evaluation of ambulatory dysfunction acute pain of left lower extremity and lower back  Patient has a significant past medical history including hypertension allergies bladder disorder  Patient reports fall in her yard approximately 3 weeks ago    States she was unable to sleep last night due to left lower extremity pain including hip and left lumbar region  States she was an avid ambulate without the assistance of her mother's walker  Images and labs were collected emergency room-see below  Patient was admitted for observation  PT will be consulted OT will be consult, provide analgesia as needed  Review of Systems:    Review of Systems   Musculoskeletal: Positive for back pain and gait problem  Left hip pain   Neurological: Positive for weakness ( left lower extremity)  All other systems reviewed and are negative  Past Medical and Surgical History:     Past Medical History:   Diagnosis Date    Allergic rhinitis     Functional disorder of bladder        Past Surgical History:   Procedure Laterality Date    HYSTERECTOMY  04/23/2019    total       Meds/Allergies:    Prior to Admission medications    Medication Sig Start Date End Date Taking? Authorizing Provider   atorvastatin (LIPITOR) 10 mg tablet Take 1 tablet (10 mg total) by mouth daily 8/7/19  Yes Radhika Hager MD   citalopram (CeleXA) 20 mg tablet Take 1 tablet (20 mg total) by mouth daily 7/14/19  Yes Radhika Hager MD   hydrOXYzine HCL (ATARAX) 50 mg tablet Take 1 tablet by mouth daily 7/3/19  Yes Historical Provider, MD   losartan (COZAAR) 100 MG tablet Take 1 tablet by mouth daily 7/3/19  Yes Historical Provider, MD   triamterene-hydrochlorothiazide (MAXZIDE) 75-50 MG per tablet Take 1 tablet by mouth daily 8/7/19  Yes Radhika Hager MD     I have reviewed home medications with patient personally  Allergies: No Known Allergies    Social History:     Marital Status:     Occupation: marketing   Patient Pre-hospital Living Situation: independent  Patient Pre-hospital Level of Mobility: limited  Patient Pre-hospital Diet Restrictions: denies  Substance Use History:   Social History     Substance and Sexual Activity   Alcohol Use Yes    Comment: Occasionally      Social History     Tobacco Use   Smoking Status Current Every Day Smoker    Packs/day: 1 00    Years: 42 00    Pack years: 42 00   Smokeless Tobacco Never Used     Social History     Substance and Sexual Activity   Drug Use Never       Family History:    Family History   Problem Relation Age of Onset    Breast cancer Maternal Aunt        Physical Exam:     Vitals:   Blood Pressure: 167/71 (11/03/19 0704)  Pulse: 84 (11/03/19 0704)  Temperature: (!) 96 7 °F (35 9 °C) (11/03/19 0412)  Temp Source: Temporal (11/03/19 0412)  Respirations: 20 (11/03/19 0704)  Height: 5' 3" (160 cm) (11/03/19 0412)  Weight - Scale: 90 9 kg (200 lb 6 4 oz) (11/03/19 0412)  SpO2: 95 % (11/03/19 0704)    Physical Exam   Constitutional: She is oriented to person, place, and time  She appears well-developed and well-nourished  No distress  HENT:   Head: Normocephalic and atraumatic  Eyes: Pupils are equal, round, and reactive to light  EOM are normal    Neck: Normal range of motion  Neck supple  Cardiovascular: Normal rate, regular rhythm, normal heart sounds and intact distal pulses  Exam reveals no gallop and no friction rub  No murmur heard  Pulmonary/Chest: Effort normal and breath sounds normal  No respiratory distress  Abdominal: Soft  Bowel sounds are normal  She exhibits no distension  Musculoskeletal: She exhibits no edema  Right shoulder: She exhibits decreased strength (Unable to fully assess due to acute pain left lower extremity)  Decreased range of motion: Left lower extremity most likely secondary to acute pain  Neurological: She is alert and oriented to person, place, and time  No cranial nerve deficit  Coordination normal    Skin: Skin is warm and dry  Capillary refill takes less than 2 seconds  No rash noted  She is not diaphoretic  No erythema  No pallor  Psychiatric: She has a normal mood and affect  Her behavior is normal  Judgment and thought content normal        Additional Data:     Lab Results: I have personally reviewed pertinent reports        Results from last 7 days   Lab Units 11/03/19  0432   WBC Thousand/uL 12 37*   HEMOGLOBIN g/dL 13 1   HEMATOCRIT % 39 3   PLATELETS Thousands/uL 358   NEUTROS PCT % 69   LYMPHS PCT % 23   MONOS PCT % 4   EOS PCT % 2     Results from last 7 days   Lab Units 11/03/19  0432   POTASSIUM mmol/L 3 5   CHLORIDE mmol/L 100   CO2 mmol/L 27   BUN mg/dL 14   CREATININE mg/dL 0 77   CALCIUM mg/dL 8 7   ALK PHOS U/L 89   ALT U/L 39   AST U/L 20           Imaging: I have personally reviewed pertinent reports  Ct Abdomen Pelvis With Contrast    Result Date: 11/3/2019  Narrative: CT ABDOMEN AND PELVIS WITH IV CONTRAST INDICATION:   pt presents with L lower back pain, L hip pain, hx of recent fall  Tender in LUQ on exam, hx of recent hysterectomy  Eval LUQ, as well as plevis and L hip for traumatic injury  COMPARISON:  None  TECHNIQUE:  CT examination of the abdomen and pelvis was performed  Axial, sagittal, and coronal 2D reformatted images were created from the source data and submitted for interpretation  Radiation dose length product (DLP) for this visit:  934 73 mGy/cm  This examination, like all CT scans performed in the [facility], was performed utilizing techniques to minimize radiation dose exposure, including the use of iterative reconstruction and automated exposure control  IV Contrast:  100 mL Omnipaque 350 Enteric Contrast:  Enteric contrast was not administered  FINDINGS: Study somewhat limited due to patient motion artifact and beam hardening artifact from body habitus and imaging patient with arms over her chest  ABDOMEN LOWER CHEST:  Dependent atelectasis in the lower lobes of the lungs bilaterally  LIVER/BILIARY TREE:  Mildly enlarged with fatty infiltrative changes  GALLBLADDER:  No calcified gallstones  No pericholecystic inflammatory change  SPLEEN:  Unremarkable  PANCREAS:  Unremarkable  ADRENAL GLANDS:  Unremarkable  KIDNEYS/URETERS:  Unremarkable  No hydronephrosis   STOMACH AND BOWEL:  Evaluation of the GI tract limited due to lack of oral contrast material  Stomach decompressed  Hiatal hernia  No evidence of small bowel obstruction  Large amount of feces throughout the colon and rectum, compatible with constipation and fecal impaction  APPENDIX:  No findings to suggest appendicitis  ABDOMINOPELVIC CAVITY:  No ascites or free intraperitoneal air  Prominent lymph nodes throughout the small bowel mesentery and inguinal regions bilaterally, left side greater than right  VESSELS:  Atherosclerotic changes are present  No evidence of aneurysm  PELVIS REPRODUCTIVE ORGANS:  Surgically absent  URINARY BLADDER:  Unremarkable  ABDOMINAL WALL/INGUINAL REGIONS:  Small umbilical hernia containing fat  OSSEOUS STRUCTURES:  Degenerative changes in the lumbar spine and bilateral hips  No acute fracture or destructive osseous lesion  Impression: 1  No traumatic solid or visceral organ injury  2   Constipation and fecal impaction  Workstation performed: [id number]       [facility] Everywhere Records Reviewed: Yes     ** Please Note: This note has been constructed using a voice recognition system   **

## 2019-11-03 NOTE — PLAN OF CARE
Problem: Potential for Falls  Goal: Patient will remain free of falls  Description  INTERVENTIONS:  - Assess patient frequently for physical needs  -  Identify cognitive and physical deficits and behaviors that affect risk of falls    -  [place] fall precautions as indicated by assessment   - Educate patient/family on patient safety including physical limitations  - Instruct patient to call for assistance with activity based on assessment  - Modify environment to reduce risk of injury  - Consider OT/PT consult to assist with strengthening/mobility  Outcome: Progressing     Problem: PAIN - ADULT  Goal: Verbalizes/displays adequate comfort level or baseline comfort level  Description  Interventions:  - Encourage patient to monitor pain and request assistance  - Assess pain using appropriate pain scale  - Administer analgesics based on type and severity of pain and evaluate response  - Implement non-pharmacological measures as appropriate and evaluate response  - Consider cultural and social influences on pain and pain management  - Notify physician/advanced practitioner if interventions unsuccessful or patient reports new pain  Outcome: Progressing     Problem: SAFETY ADULT  Goal: Maintain or return to baseline ADL function  Description  INTERVENTIONS:  -  Assess patient's ability to carry out ADLs; assess patient's baseline for ADL function and identify physical deficits which impact ability to perform ADLs (bathing, care of mouth/teeth, toileting, grooming, dressing, etc )  - Assess/evaluate cause of self-care deficits   - Assess range of motion  - Assess patient's mobility; develop plan if impaired  - Assess patient's need for assistive devices and provide as appropriate  - Encourage maximum independence but intervene and supervise when necessary  - Involve family in performance of ADLs  - Assess for home care needs following discharge   - Consider OT consult to assist with ADL evaluation and planning for discharge  - Provide patient education as appropriate  Outcome: Progressing  Goal: Maintain or return mobility status to optimal level  Description  INTERVENTIONS:  - Assess patient's baseline mobility status (ambulation, transfers, stairs, etc )    - Identify cognitive and physical deficits and behaviors that affect mobility  - Identify mobility aids required to assist with transfers and/or ambulation (gait belt, sit-to-stand, lift, walker, cane, etc )  - [place] fall precautions as indicated by assessment  - Record patient progress and toleration of activity level on Mobility SBAR; progress patient to next Phase/Stage  - Instruct patient to call for assistance with activity based on assessment  - Consider rehabilitation consult to assist with strengthening/weightbearing, etc   Outcome: Progressing     Problem: DISCHARGE PLANNING  Goal: Discharge to home or other facility with appropriate resources  Description  INTERVENTIONS:  - Identify barriers to discharge w/patient and caregiver  - Arrange for needed discharge resources and transportation as appropriate  - Identify discharge learning needs (meds, wound care, etc )  - Arrange for interpretive services to assist at discharge as needed  - Refer to Case Management Department for coordinating discharge planning if the patient needs post-hospital services based on physician/advanced practitioner order or complex needs related to functional status, cognitive ability, or social support system  Outcome: Progressing     Problem: Knowledge Deficit  Goal: Patient/family/caregiver demonstrates understanding of disease process, treatment plan, medications, and discharge instructions  Description  Complete learning assessment and assess knowledge base    Interventions:  - Provide teaching at level of understanding  - Provide teaching via preferred learning methods  Outcome: Progressing

## 2019-11-03 NOTE — ASSESSMENT & PLAN NOTE
Pain to left trochanter region upon palpation  Limited range of motion most likely secondary to acute pain  PT/OT consult  One time baclofen,Toradol  PT and OT consulted  Ambulate with assist at all times

## 2019-11-03 NOTE — ASSESSMENT & PLAN NOTE
Patient reports increased weakness and left lower extremity  Patient admits to pain in lumbar region left hip  Unable to fully assess due to acute pain  PT/OT consulted  Ambulate with assist at all times

## 2019-11-03 NOTE — ED PROVIDER NOTES
History  Chief Complaint   Patient presents with    Pain     pain in back and down left leg  Tore left hamstring 7 years ago  HPI  This is a 52-year-old woman who comes in with acute onset of left hip pain, left lower back pain  Patient does have history of chronic low back pain, with states she has never had pain like this before  Denies any bowel or bladder incontinence, denies any saddle anesthesia, denies any paresthesias of her left leg  Patient states that yesterday she started with pain that was worse with ambulation that progressively worsened until this morning, which could not get comfortable secondary to pain  Only trauma the patient endorses is falling 3 weeks ago in her yd  The only time that the patient has felt any pain similar to this was when she ruptured her hamstring 7 years ago  Patient denies any fevers or chills, denies chest pain denies abdominal pain  Patient did have history of recent hysterectomy months ago  Prior to Admission Medications   Prescriptions Last Dose Informant Patient Reported?  Taking?   atorvastatin (LIPITOR) 10 mg tablet 11/2/2019 at Unknown time  No Yes   Sig: Take 1 tablet (10 mg total) by mouth daily   citalopram (CeleXA) 20 mg tablet 11/2/2019 at Unknown time  No Yes   Sig: Take 1 tablet (20 mg total) by mouth daily   hydrOXYzine HCL (ATARAX) 50 mg tablet 11/2/2019 at Unknown time  Yes Yes   Sig: Take 1 tablet by mouth daily   losartan (COZAAR) 100 MG tablet 11/2/2019 at Unknown time  Yes Yes   Sig: Take 1 tablet by mouth daily   triamterene-hydrochlorothiazide (MAXZIDE) 75-50 MG per tablet 11/2/2019 at Unknown time  No Yes   Sig: Take 1 tablet by mouth daily      Facility-Administered Medications: None       Past Medical History:   Diagnosis Date    Allergic rhinitis     Functional disorder of bladder        Past Surgical History:   Procedure Laterality Date    HYSTERECTOMY  04/23/2019    total       Family History   Problem Relation Age of Onset    Breast cancer Maternal Aunt      I have reviewed and agree with the history as documented  Social History     Tobacco Use    Smoking status: Current Every Day Smoker     Packs/day: 1 00     Years: 42 00     Pack years: 42 00    Smokeless tobacco: Never Used   Substance Use Topics    Alcohol use: Yes     Comment: Occasionally     Drug use: Never        Review of Systems   Constitutional: Negative  Negative for chills and fever  HENT: Negative  Negative for congestion and sore throat  Eyes: Negative  Negative for discharge and redness  Respiratory: Negative  Negative for chest tightness and shortness of breath  Cardiovascular: Negative  Negative for chest pain and palpitations  Gastrointestinal: Negative  Negative for abdominal pain, nausea and vomiting  Endocrine: Negative  Negative for cold intolerance and polyphagia  Genitourinary: Negative  Negative for difficulty urinating and dysuria  Musculoskeletal: Positive for arthralgias  Negative for back pain  Skin: Negative  Negative for color change and wound  Allergic/Immunologic: Negative  Negative for environmental allergies  Neurological: Negative  Negative for dizziness, weakness and headaches  Hematological: Negative  Psychiatric/Behavioral: Negative  Negative for behavioral problems  The patient is not nervous/anxious  All other systems reviewed and are negative  Physical Exam  Physical Exam   Constitutional: She is oriented to person, place, and time  She appears well-developed and well-nourished  No distress  HENT:   Head: Normocephalic and atraumatic  Right Ear: External ear normal    Left Ear: External ear normal    Mouth/Throat: Oropharynx is clear and moist    Eyes: Pupils are equal, round, and reactive to light  Conjunctivae and EOM are normal  Right eye exhibits no discharge  Left eye exhibits no discharge  No scleral icterus  Neck: Normal range of motion  Neck supple   No tracheal deviation present  No thyromegaly present  Cardiovascular: Normal rate, regular rhythm and intact distal pulses  Exam reveals no gallop and no friction rub  No murmur heard  Pulmonary/Chest: Effort normal and breath sounds normal  No stridor  No respiratory distress  She has no wheezes  She has no rales  Abdominal: Soft  Bowel sounds are normal  She exhibits no distension  There is tenderness (Patient is tender at the epigastrium and left upper quadrant)  There is no rebound and no guarding  Musculoskeletal: Normal range of motion  She exhibits tenderness ( patient is tender at the left SI joint, she has no midline tenderness of her CT or L-spine  Patient is is tender over the left greater trochanter  )  She exhibits no edema or deformity  Neurological: She is alert and oriented to person, place, and time  No cranial nerve deficit  Skin: Skin is warm and dry  No rash noted  She is not diaphoretic  No erythema  Psychiatric: She has a normal mood and affect  Her behavior is normal  Thought content normal    Nursing note and vitals reviewed        Vital Signs  ED Triage Vitals [11/03/19 0412]   Temperature Pulse Respirations Blood Pressure SpO2   (!) 96 7 °F (35 9 °C) 75 20 167/71 95 %      Temp Source Heart Rate Source Patient Position - Orthostatic VS BP Location FiO2 (%)   Temporal Monitor Sitting Left arm --      Pain Score       Worst Possible Pain           Vitals:    11/03/19 0412 11/03/19 0704   BP: 167/71 167/71   Pulse: 75 84   Patient Position - Orthostatic VS: Sitting Lying         Visual Acuity      ED Medications  Medications   lidocaine (LIDODERM) 5 % patch 1 patch (1 patch Topical Medication Applied 11/3/19 8539)   fentanyl citrate (PF) 100 MCG/2ML 50 mcg (50 mcg Intravenous Given 11/3/19 3855)   iohexol (OMNIPAQUE) 350 MG/ML injection (SINGLE-DOSE) 100 mL (100 mL Intravenous Given 11/3/19 2540)   fentanyl citrate (PF) 100 MCG/2ML 50 mcg (50 mcg Intravenous Given 11/3/19 7080)   ketorolac (TORADOL) injection 15 mg (15 mg Intravenous Given 11/3/19 0702)   acetaminophen (TYLENOL) tablet 650 mg (650 mg Oral Given 11/3/19 0703)   lidocaine (PF) (XYLOCAINE-MPF) 1 % injection 5 mL (5 mL Infiltration Given 11/3/19 0705)       Diagnostic Studies  Results Reviewed     Procedure Component Value Units Date/Time    UA w Reflex to Microscopic [[id number]] Collected:  11/03/19 0701    Lab Status:  Final result Specimen:  Urine, Clean Catch Updated:  11/03/19 0708     Color, UA Yellow     Clarity, UA Clear     Specific Gravity, UA <=1 005     pH, UA 6 5     Leukocytes, UA Negative     Nitrite, UA Negative     Protein, UA Negative mg/dl      Glucose, UA Negative mg/dl      Ketones, UA Negative mg/dl      Urobilinogen, UA 0 2 E U /dl      Bilirubin, UA Negative     Blood, UA Negative    Comprehensive metabolic panel [[id number]] Collected:  11/03/19 0432    Lab Status:  Final result Specimen:  Blood from Arm, Right Updated:  11/03/19 0457     Sodium 137 mmol/L      Potassium 3 5 mmol/L      Chloride 100 mmol/L      CO2 27 mmol/L      ANION GAP 10 mmol/L      BUN 14 mg/dL      Creatinine 0 77 mg/dL      Glucose 115 mg/dL      Calcium 8 7 mg/dL      AST 20 U/L      ALT 39 U/L      Alkaline Phosphatase 89 U/L      Total Protein 7 5 g/dL      Albumin 3 8 g/dL      Total Bilirubin 0 50 mg/dL      eGFR 82 ml/min/1 73sq m     Narrative:       [place] guidelines for Chronic Kidney Disease (CKD):     Stage 1 with normal or high GFR (GFR > 90 mL/min/1 73 square meters)    Stage 2 Mild CKD (GFR = 60-89 mL/min/1 73 square meters)    Stage 3A Moderate CKD (GFR = 45-59 mL/min/1 73 square meters)    Stage 3B Moderate CKD (GFR = 30-44 mL/min/1 73 square meters)    Stage 4 Severe CKD (GFR = 15-29 mL/min/1 73 square meters)    Stage 5 End Stage CKD (GFR <15 mL/min/1 73 square meters)  Note: GFR calculation is accurate only with a steady state creatinine    Troponin I [[id number]]  (Normal) Collected:  11/03/19 3056 Lab Status:  Final result Specimen:  Blood from Arm, Right Updated:  11/03/19 0457     Troponin I <0 02 ng/mL     Lipase [[id number]]  (Normal) Collected:  11/03/19 0432    Lab Status:  Final result Specimen:  Blood from Arm, Right Updated:  11/03/19 0449     Lipase 110 u/L     CBC and differential [[id number]]  (Abnormal) Collected:  11/03/19 0432    Lab Status:  Final result Specimen:  Blood from Arm, Right Updated:  11/03/19 0439     WBC 12 37 Thousand/uL      RBC 4 43 Million/uL      Hemoglobin 13 1 g/dL      Hematocrit 39 3 %      MCV 89 fL      MCH 29 6 pg      MCHC 33 3 g/dL      RDW 13 9 %      MPV 10 4 fL      Platelets 167 Thousands/uL      nRBC 0 /100 WBCs      Neutrophils Relative 69 %      Immat GRANS % 1 %      Lymphocytes Relative 23 %      Monocytes Relative 4 %      Eosinophils Relative 2 %      Basophils Relative 1 %      Neutrophils Absolute 8 57 Thousands/µL      Immature Grans Absolute 0 06 Thousand/uL      Lymphocytes Absolute 2 78 Thousands/µL      Monocytes Absolute 0 55 Thousand/µL      Eosinophils Absolute 0 29 Thousand/µL      Basophils Absolute 0 12 Thousands/µL                  XR chest portable   ED Interpretation by Edmundo Dominique MD (11/03 0996)   No cardiopulmonary disease  CT abdomen pelvis with contrast   Final Result by Belia Tamayo DO (11/03 0554)   1  No traumatic solid or visceral organ injury  2   Constipation and fecal impaction  Workstation performed: [id number]                    Procedures  Procedures       ED Course  ED Course as of Nov 03 0758   Liz Jennifer Nov 03, 2019   3347 Workup was notable for constipation  Well this could cause back pain, unclear or constipation cause hip pain  Postvoid residual was 40 mL  After 2 doses of fentanyl, ketorolac, Tylenol, patient was still having pain  2 cc of lidocaine was injected into the left greater  trochanterbursa without effect  Lidocaine patch  was ordered  Attempted ambulatory trial which patient failed  After walking, patient was riding around in bed and could not get comfortable  Will admit the patient for ambulatory dysfunction  Discussed with the patient that the admitting team will likely not give narcotic pain medication  Patient is in agreement with this but cannot walk secondary to pain  0757 This EKG was interpreted by me  The EKG demonstrates Normal sinus rhythm, normal intervals and axis, normal QRS, no acute ST changes present  HEART Risk Score      Most Recent Value   History  0 Filed at: 11/03/2019 0757   ECG  0 Filed at: 11/03/2019 0757   Age  1 Filed at: 11/03/2019 0757   Risk Factors  2 Filed at: 11/03/2019 0757   Troponin  0 Filed at: 11/03/2019 0757   Heart Score Risk Calculator   History  0 Filed at: 11/03/2019 0757   ECG  0 Filed at: 11/03/2019 0757   Age  1 Filed at: 11/03/2019 0757   Risk Factors  2 Filed at: 11/03/2019 0757   Troponin  0 Filed at: 11/03/2019 0757   HEART Score  3 Filed at: 11/03/2019 0757   HEART Score  3 Filed at: 11/03/2019 0757                            MDM  Number of Diagnoses or Management Options  Abdominal tenderness:   Acute hip pain, left:   Ambulatory dysfunction:   Diagnosis management comments: This is a 18-year-old woman who presents with left hip pain of unclear etiology  Given the abdominal tenderness, and her recent surgery will get a CT scan of her abdomen  Given that is left upper quadrant, will get a chest x-ray and 1 troponin and 1 EKG  Given the left hip pain, will get imaging of that with the CT scan of her abdomen  Will give patient pain control  Disposition will be pending results workup        Disposition  Final diagnoses:   Ambulatory dysfunction   Acute hip pain, left   Abdominal tenderness     Time reflects when diagnosis was documented in both MDM as applicable and the Disposition within this note     Time User Action Codes Description Comment    11/3/2019  7:44 AM Kya Roche Add [R26 2] Ambulatory dysfunction 11/3/2019  7:45 AM Mosie Safe Add [M25 552] Acute hip pain, left     11/3/2019  7:47 AM Mosie Safe Add [R10 9] Abdominal pain     11/3/2019  7:50 AM Mosie Safe Remove [R10 9] Abdominal pain     11/3/2019  7:50 AM Mosie Safe Add [R10 819] Abdominal tenderness       ED Disposition     ED Disposition Condition Date/Time Comment    Admit Stable Sun Nov 3, 2019  7:46 AM [facility] obs        Follow-up Information    None         Patient's Medications   Discharge Prescriptions    No medications on file     No discharge procedures on file      ED Provider  Electronically Signed by           Dian Rehman MD  11/03/19 7217       Dian Rehman MD  11/03/19 9153

## 2019-11-04 ENCOUNTER — APPOINTMENT (OUTPATIENT)
Dept: RADIOLOGY | Facility: HOSPITAL | Age: 64
End: 2019-11-04
Payer: COMMERCIAL

## 2019-11-04 VITALS
RESPIRATION RATE: 18 BRPM | SYSTOLIC BLOOD PRESSURE: 148 MMHG | WEIGHT: 197.09 LBS | HEIGHT: 63 IN | DIASTOLIC BLOOD PRESSURE: 61 MMHG | HEART RATE: 64 BPM | OXYGEN SATURATION: 89 % | BODY MASS INDEX: 34.92 KG/M2 | TEMPERATURE: 98.3 F

## 2019-11-04 LAB
ANION GAP SERPL CALCULATED.3IONS-SCNC: 6 MMOL/L (ref 4–13)
ATRIAL RATE: 74 BPM
BUN SERPL-MCNC: 23 MG/DL (ref 5–25)
CALCIUM SERPL-MCNC: 8.7 MG/DL (ref 8.3–10.1)
CHLORIDE SERPL-SCNC: 103 MMOL/L (ref 100–108)
CO2 SERPL-SCNC: 29 MMOL/L (ref 21–32)
CREAT SERPL-MCNC: 0.72 MG/DL (ref 0.6–1.3)
ERYTHROCYTE [DISTWIDTH] IN BLOOD BY AUTOMATED COUNT: 13.8 % (ref 11.6–15.1)
GFR SERPL CREATININE-BSD FRML MDRD: 89 ML/MIN/1.73SQ M
GLUCOSE SERPL-MCNC: 101 MG/DL (ref 65–140)
HCT VFR BLD AUTO: 38.6 % (ref 34.8–46.1)
HGB BLD-MCNC: 12.8 G/DL (ref 11.5–15.4)
MAGNESIUM SERPL-MCNC: 2.3 MG/DL (ref 1.6–2.6)
MCH RBC QN AUTO: 29.8 PG (ref 26.8–34.3)
MCHC RBC AUTO-ENTMCNC: 33.2 G/DL (ref 31.4–37.4)
MCV RBC AUTO: 90 FL (ref 82–98)
P AXIS: 43 DEGREES
PHOSPHATE SERPL-MCNC: 4.5 MG/DL (ref 2.3–4.1)
PLATELET # BLD AUTO: 350 THOUSANDS/UL (ref 149–390)
PMV BLD AUTO: 10.6 FL (ref 8.9–12.7)
POTASSIUM SERPL-SCNC: 4.2 MMOL/L (ref 3.5–5.3)
PR INTERVAL: 182 MS
QRS AXIS: 41 DEGREES
QRSD INTERVAL: 76 MS
QT INTERVAL: 420 MS
QTC INTERVAL: 466 MS
RBC # BLD AUTO: 4.3 MILLION/UL (ref 3.81–5.12)
SODIUM SERPL-SCNC: 138 MMOL/L (ref 136–145)
T WAVE AXIS: 51 DEGREES
VENTRICULAR RATE: 74 BPM
WBC # BLD AUTO: 10.22 THOUSAND/UL (ref 4.31–10.16)

## 2019-11-04 PROCEDURE — 85027 COMPLETE CBC AUTOMATED: CPT | Performed by: NURSE PRACTITIONER

## 2019-11-04 PROCEDURE — G8988 SELF CARE GOAL STATUS: HCPCS

## 2019-11-04 PROCEDURE — G8978 MOBILITY CURRENT STATUS: HCPCS

## 2019-11-04 PROCEDURE — 99244 OFF/OP CNSLTJ NEW/EST MOD 40: CPT | Performed by: ORTHOPAEDIC SURGERY

## 2019-11-04 PROCEDURE — 84100 ASSAY OF PHOSPHORUS: CPT | Performed by: NURSE PRACTITIONER

## 2019-11-04 PROCEDURE — 83735 ASSAY OF MAGNESIUM: CPT | Performed by: NURSE PRACTITIONER

## 2019-11-04 PROCEDURE — 97167 OT EVAL HIGH COMPLEX 60 MIN: CPT

## 2019-11-04 PROCEDURE — 97163 PT EVAL HIGH COMPLEX 45 MIN: CPT

## 2019-11-04 PROCEDURE — 99217 PR OBSERVATION CARE DISCHARGE MANAGEMENT: CPT | Performed by: NURSE PRACTITIONER

## 2019-11-04 PROCEDURE — 72170 X-RAY EXAM OF PELVIS: CPT

## 2019-11-04 PROCEDURE — 20610 DRAIN/INJ JOINT/BURSA W/O US: CPT | Performed by: PHYSICIAN ASSISTANT

## 2019-11-04 PROCEDURE — 93010 ELECTROCARDIOGRAM REPORT: CPT | Performed by: INTERNAL MEDICINE

## 2019-11-04 PROCEDURE — 80048 BASIC METABOLIC PNL TOTAL CA: CPT | Performed by: NURSE PRACTITIONER

## 2019-11-04 PROCEDURE — G8979 MOBILITY GOAL STATUS: HCPCS

## 2019-11-04 PROCEDURE — G8987 SELF CARE CURRENT STATUS: HCPCS

## 2019-11-04 RX ORDER — METHYLPREDNISOLONE ACETATE 80 MG/ML
80 INJECTION, SUSPENSION INTRA-ARTICULAR; INTRALESIONAL; INTRAMUSCULAR; SOFT TISSUE ONCE
Status: COMPLETED | OUTPATIENT
Start: 2019-11-04 | End: 2019-11-04

## 2019-11-04 RX ORDER — BACLOFEN 5 MG/1
5 TABLET ORAL 3 TIMES DAILY
Qty: 15 TABLET | Refills: 0 | Status: SHIPPED | OUTPATIENT
Start: 2019-11-04 | End: 2020-04-20 | Stop reason: ALTCHOICE

## 2019-11-04 RX ORDER — LIDOCAINE HYDROCHLORIDE 10 MG/ML
10 INJECTION, SOLUTION INFILTRATION; PERINEURAL ONCE
Status: COMPLETED | OUTPATIENT
Start: 2019-11-04 | End: 2019-11-04

## 2019-11-04 RX ORDER — BACLOFEN 10 MG/1
5 TABLET ORAL ONCE
Status: COMPLETED | OUTPATIENT
Start: 2019-11-04 | End: 2019-11-04

## 2019-11-04 RX ADMIN — LIDOCAINE HYDROCHLORIDE 10 ML: 10 INJECTION, SOLUTION INFILTRATION; PERINEURAL at 16:56

## 2019-11-04 RX ADMIN — METHYLPREDNISOLONE ACETATE 80 MG: 80 INJECTION, SUSPENSION INTRA-ARTICULAR; INTRALESIONAL; INTRAMUSCULAR; SOFT TISSUE at 16:56

## 2019-11-04 RX ADMIN — BACLOFEN 5 MG: 10 TABLET ORAL at 09:29

## 2019-11-04 RX ADMIN — LOSARTAN POTASSIUM 100 MG: 50 TABLET, FILM COATED ORAL at 09:29

## 2019-11-04 RX ADMIN — HYDROMORPHONE HYDROCHLORIDE 0.5 MG: 1 INJECTION, SOLUTION INTRAMUSCULAR; INTRAVENOUS; SUBCUTANEOUS at 14:41

## 2019-11-04 RX ADMIN — KETOROLAC TROMETHAMINE 15 MG: 30 INJECTION, SOLUTION INTRAMUSCULAR at 00:09

## 2019-11-04 RX ADMIN — HYDROMORPHONE HYDROCHLORIDE 0.5 MG: 1 INJECTION, SOLUTION INTRAMUSCULAR; INTRAVENOUS; SUBCUTANEOUS at 10:47

## 2019-11-04 RX ADMIN — KETOROLAC TROMETHAMINE 15 MG: 30 INJECTION, SOLUTION INTRAMUSCULAR at 05:59

## 2019-11-04 RX ADMIN — CITALOPRAM HYDROBROMIDE 20 MG: 20 TABLET ORAL at 09:25

## 2019-11-04 RX ADMIN — HYDROMORPHONE HYDROCHLORIDE 0.5 MG: 1 INJECTION, SOLUTION INTRAMUSCULAR; INTRAVENOUS; SUBCUTANEOUS at 02:48

## 2019-11-04 RX ADMIN — TRIAMTERENE AND HYDROCHLOROTHIAZIDE 2 TABLET: 37.5; 25 TABLET ORAL at 09:29

## 2019-11-04 RX ADMIN — BACLOFEN 5 MG: 10 TABLET ORAL at 18:23

## 2019-11-04 RX ADMIN — BACLOFEN 5 MG: 10 TABLET ORAL at 15:57

## 2019-11-04 RX ADMIN — ENOXAPARIN SODIUM 40 MG: 40 INJECTION SUBCUTANEOUS at 09:34

## 2019-11-04 NOTE — UTILIZATION REVIEW
Initial Clinical Review    Admission: Date/Time/Statement: 11/03/19 @ 0749 -- OBS  Orders Placed This Encounter   Procedures    Place in Observation     Standing Status:   Standing     Number of Occurrences:   1     Order Specific Question:   Admitting Physician     Answer:   Adeline Brown [[id number]]     Order Specific Question:   Level of Care     Answer:   Med Surg [16]     ED Arrival Information     Expected Arrival Acuity Means of Arrival Escorted By Service Admission Type    - 11/3/2019 04:03 Urgent Walk-In Family Member General Medicine Urgent    Arrival Complaint    Back Pain        Chief Complaint   Patient presents with    Pain     pain in back and down left leg  Tore left hamstring 7 years ago  Assessment/Plan:  62 y o  female who presents to ED for evaluation of ambulatory dysfunction, acute pain of LLE and lower back  Patient has a significant past medical history including HTN, allergies, bladder disorder  Patient reports fall in her yard ~3 weeks ago  States she was unable to sleep last night d/t LLE pain including hip and left lumbar region  States she was an avid ambulattor without the assistance of her mother's walker  Pain to left trochanter region upon palpation  Limited range of motion most likely secondary to acute pain  Admit observation with Ambulatory dysfunction /  Acute hip pain, left  PT/OT consulted, Ambulate with assist at all times, One time baclofen,Toradol    Ortho consult 11/4 ---  A: left hip trochanteric bursitis with pre-existing osteoarthritis chronic back pain  Plan:    · WBAT left lower extremity  · PT  · Pain control  · Body mass index is 34 91 kg/m²  moderately obese  Recommend nutrition    · Dispo: Ortho will follow  · Will plan for injection of left trochanteric bursa with Depo-Medrol and local anesthetic after radiographs of the left hip are obtained       ED Triage Vitals [11/03/19 0412]   Temperature Pulse Respirations Blood Pressure SpO2   (!) 96 7 °F (35 9 °C) 75 20 167/71 95 %      Temp Source Heart Rate Source Patient Position - Orthostatic VS BP Location FiO2 (%)   Temporal Monitor Sitting Left arm --      Pain Score       Worst Possible Pain        Wt Readings from Last 1 Encounters:   11/04/19 89 4 kg (197 lb 1 5 oz)     Additional Vital Signs:   Date/Time  Temp  Pulse  Resp  BP  MAP (mmHg)  SpO2  O2 Device   11/04/19 07:35:44    71  17  149/62  91  94 %     11/03/19 22:54:53  98 2 °F (36 8 °C)  74  17  149/64  92  92 %  None (Room air)   11/03/19 19:19:02    78    133/53  80  93 %     11/03/19 10:26:05    80    129/51  77  97 %     11/03/19 1020              None (Room air)   11/03/19 08:28:05    74  18  121/75  90  98 %     11/03/19 0800    75  19  142/74    94 %  None (Room air)   11/03/19 0704    84  20  167/71    95 %  None (Room air)   11/03/19 0412  96 7 °F (35 9 °C)Abnormal   75  20  167/71    95 %  None (Room air)     Pertinent Labs/Diagnostic Test Results:   CT a/p 11/3 --  1   No traumatic solid or visceral organ injury  2   Constipation and fecal impaction  CXR 11/3 -- No active pulmonary disease  XR pelvis 11/4 -- No acute osseous abnormality        Results from last 7 days   Lab Units 11/04/19  0503 11/03/19  0852 11/03/19  0432   WBC Thousand/uL 10 22*  --  12 37*   HEMOGLOBIN g/dL 12 8  --  13 1   HEMATOCRIT % 38 6  --  39 3   PLATELETS Thousands/uL 350 351 358   NEUTROS ABS Thousands/µL  --   --  8 57*     Results from last 7 days   Lab Units 11/04/19  0503 11/03/19  0432   SODIUM mmol/L 138 137   POTASSIUM mmol/L 4 2 3 5   CHLORIDE mmol/L 103 100   CO2 mmol/L 29 27   ANION GAP mmol/L 6 10   BUN mg/dL 23 14   CREATININE mg/dL 0 72 0 77   EGFR ml/min/1 73sq m 89 82   CALCIUM mg/dL 8 7 8 7   MAGNESIUM mg/dL 2 3  --    PHOSPHORUS mg/dL 4 5*  --      Results from last 7 days   Lab Units 11/03/19  0432   AST U/L 20   ALT U/L 39   ALK PHOS U/L 89   TOTAL PROTEIN g/dL 7 5   ALBUMIN g/dL 3 8   TOTAL BILIRUBIN mg/dL 0 50 Results from last 7 days   Lab Units 11/04/19  0503 11/03/19  0432   GLUCOSE RANDOM mg/dL 101 115     Results from last 7 days   Lab Units 11/03/19  0432   TROPONIN I ng/mL <0 02     Results from last 7 days   Lab Units 11/03/19  0432   LIPASE u/L 110     Results from last 7 days   Lab Units 11/03/19  0701   CLARITY UA  Clear   COLOR UA  Yellow   SPEC GRAV UA  <=1 005   PH UA  6 5   GLUCOSE UA mg/dl Negative   KETONES UA mg/dl Negative   BLOOD UA  Negative   PROTEIN UA mg/dl Negative   NITRITE UA  Negative   BILIRUBIN UA  Negative   UROBILINOGEN UA E U /dl 0 2   LEUKOCYTES UA  Negative     ED Treatment:   Medication Administration from 11/03/2019 0403 to 11/03/2019 2602       Date/Time Order Dose Route Action     11/03/2019 0437 fentanyl citrate (PF) 100 MCG/2ML 50 mcg 50 mcg Intravenous Given     11/03/2019 0659 fentanyl citrate (PF) 100 MCG/2ML 50 mcg 50 mcg Intravenous Given     11/03/2019 1555 ketorolac (TORADOL) injection 15 mg 15 mg Intravenous Given     11/03/2019 0703 acetaminophen (TYLENOL) tablet 650 mg 650 mg Oral Given     11/03/2019 0705 lidocaine (PF) (XYLOCAINE-MPF) 1 % injection 5 mL 5 mL Infiltration Given     11/03/2019 0751 lidocaine (LIDODERM) 5 % patch 1 patch 1 patch Topical Medication Applied     11/03/2019 0812 baclofen tablet 5 mg 5 mg Oral Given     11/03/2019 7354 ketorolac (TORADOL) injection 15 mg 15 mg Intravenous Given     Past Medical History:   Diagnosis Date    Allergic rhinitis     Functional disorder of bladder     Hypertension      Present on Admission:   Essential hypertension   Acute hip pain, left   Ambulatory dysfunction      Admitting Diagnosis: Back pain [M54 9]  Abdominal tenderness [R10 819]  Acute hip pain, left [M25 552]  Ambulatory dysfunction [R26 2]  Age/Sex: 62 y o  female  Admission Orders:  Scheduled Medications:  Medications:  baclofen 5 mg Oral TID   citalopram 20 mg Oral Daily   enoxaparin 40 mg Subcutaneous Daily   losartan 100 mg Oral Daily nicotine 1 patch Transdermal Daily   triamterene-hydrochlorothiazide 2 tablet Oral Daily        PRN Meds:  HYDROmorphone 0 5 mg Intravenous Q4H PRN  11/3 x2, 11/4 x3     Daily wts  I/O's  Up with assist  Cardiac diet      IP CONSULT TO CASE MANAGEMENT    Network Utilization Review Department  [email or web address]  ATTENTION: Please call with any questions or concerns to [phone] and carefully listen to the prompts so that you are directed to the right person  All voicemails are confidential   Luz Elena Velazquez all requests for admission clinical reviews, approved or denied determinations and any other requests to dedicated fax number below belonging to the [place] where the patient is receiving treatment    FACILITY NAME UR FAX NUMBER   ADMISSION DENIALS (Administrative/Medical Necessity) [address] [facility] (Maternity/NICU/Pediatrics) [phone]   [facility] [id number] [place] [address] [address] [facility] [phone]   [address] [place] [address] [address] [facility] [phone]   Helon Milder [address] [phone]

## 2019-11-04 NOTE — PLAN OF CARE
Problem: OCCUPATIONAL THERAPY ADULT  Goal: Performs self-care activities at highest level of function for planned discharge setting  See evaluation for individualized goals  Description  Treatment Interventions: ADL retraining, Functional transfer training, UE strengthening/ROM, Endurance training, Patient/family training, Equipment evaluation/education, Activityengagement, Compensatory technique education          See flowsheet documentation for full assessment, interventions and recommendations  Note:   Limitation: Decreased ADL status, Decreased UE strength, Decreased Safe judgement during ADL, Decreased endurance, Decreased self-care trans, Decreased high-level ADLs     Assessment: Pt is a 62 y o  female seen for OT evaluation s/p admit to [facility] on 11/3/2019 w/ Acute hip pain, left  Comorbidities affecting pt's functional performance at time of assessment include: HTN and functional disorder of bladder, allergic rhinitis  Personal factors affecting pt at time of IE include:steps to enter environment, difficulty performing ADLS, difficulty performing IADLS , decreased initiation and engagement  and health management   Prior to admission, pt was (I) with ADLs and IADLs with use of standard walker during functional mobility  Upon evaluation: Pt requires (S) level with use of standard walker during functional mobility 2* the following deficits impacting occupational performance: weakness, decreased strength, decreased balance, decreased tolerance, impaired initiation, decreased safety awareness and increased pain  Pt to benefit from continued skilled OT tx while in the hospital to address deficits as defined above and maximize level of functional independence w ADL's and functional mobility  Occupational Performance areas to address include: grooming, bathing/shower, toilet hygiene, dressing, functional mobility, community mobility and clothing management   From OT standpoint, recommendation at time of d/c would be outpatient PT services       OT Discharge Recommendation: Other (Comment)(OP PT)

## 2019-11-04 NOTE — PROGRESS NOTES
Procedure- Orthopedics   Bettie Pelletier 62 y o  female MRN: [id number]  Unit/Bed#: 415-01    Procedure: left greater trochanteric steroid injection    After sterile preparation of the skin overlying the left greater trochanteric region with Betadine and 2 alcohol swabs, a mixture of 1% plain lidocaine 8 mL(lot # [id number] Exp 11/21) and Depo-Medrol 80 milligrams/milliliter 1 mL (Lot# [id number] Exp 5/20) was injected into the greater trochanteric bursal region with a 22 gauge spinal needle  Patient tolerated the injection well  There is excellent hemostasis  A large Band-Aid was applied and patient was instructed to ice knee 20 minutes 1-2 times this evening  She may weightbear as tolerated tomorrow  She may work with physical therapy  She was neurovascularly intact both pre and post procedure      Madelyn Grossman PA-C

## 2019-11-04 NOTE — ASSESSMENT & PLAN NOTE
Pain to left trochanter region upon palpation  Limited range of motion most likely secondary to acute pain  PT/OT consult  One time baclofen,Toradol  PT and OT consulted  Ambulate with assist at all times  Consulted ortho   Images collected-a to have greater trochanter bursitis-ortho injected it withDepo-Medrol

## 2019-11-04 NOTE — PLAN OF CARE
Problem: PHYSICAL THERAPY ADULT  Goal: Performs mobility at highest level of function for planned discharge setting  See evaluation for individualized goals  Description  Treatment/Interventions: Functional transfer training, LE strengthening/ROM, Elevations, Therapeutic exercise, Endurance training, Bed mobility, Gait training          See flowsheet documentation for full assessment, interventions and recommendations  Note:   Prognosis: Guarded  Problem List: Decreased strength, Decreased range of motion, Decreased endurance, Impaired balance, Decreased mobility, Pain  Assessment: Patient is a 62 y o  female evaluated by Physical Therapy s/p admit to [address] [place] [address] on 11/3/2019 with admitting diagnosis of: Back pain, Abdominal tenderness, Acute hip pain, left, Ambulatory dysfunction, and principal problem of: Acute hip pain, left  PT was consulted to assess patient's functional mobility and discharge needs  Ordered are PT Evaluation and treatment with activity level of: up with assistance  Comorbidities affecting patient's physical performance at time of assessment include: HTN  Personal factors affecting the patient at time of IE include: step(s) to enter home and lives alone  Please locate objective findings from PT assessment regarding body systems outlined above  Upon evaluation, pt able to perform all functional mobility with SUP and standard walker, however pt severely limited by pain  Increased time required to perform tasks d/t sharp pain with movement  Pt states this pain is very similar to when she tore her L hamstring about 10 years ago  Pt declining further ambulation or sitting OOB in recliner at this time  Recommend continued PT intervention to encourage increased mobility when pain is better controled  D/c recommendation at this time is OP PT          Recommendation: Outpatient PT     PT - OK to Discharge: No(when medically appropriate)    See flowsheet documentation for full assessment

## 2019-11-04 NOTE — OCCUPATIONAL THERAPY NOTE
Occupational Therapy Evaluation     Patient Name: Dayanara Burgos  TWYNX'G Date: 11/4/2019  Problem List  Principal Problem:    Acute hip pain, left  Active Problems:    Essential hypertension    Ambulatory dysfunction    Past Medical History  Past Medical History:   Diagnosis Date    Allergic rhinitis     Functional disorder of bladder     Hypertension      Past Surgical History  Past Surgical History:   Procedure Laterality Date    HYSTERECTOMY  04/23/2019    total             11/04/19 0816   Note Type   Note type Eval/Treat   Restrictions/Precautions   Weight Bearing Precautions Per Order No   Other Precautions Fall Risk;Pain   Pain Assessment   Pain Assessment 0-10   Pain Score Worst Possible Pain   Pain Type Acute pain   Pain Location Back; Hip   Pain Orientation Left   Home Living   Type of Home Mobile home   Home Layout One level;Performs ADLs on one level; Able to live on main level with bedroom/bathroom;Stairs to enter with rails; Other (Comment)  (5 PATRICIO c HR)   Bathroom Shower/Tub Tub/shower unit   Bathroom Toilet Standard   Bathroom Accessibility Accessible   Home Equipment Walker   Additional Comments pt reports use of stand walker recently due to hip pain   Prior Function   Level of [place] Independent with ADLs and functional mobility   Lives With Alone   ADL Assistance Independent   IADLs Independent   Falls in the last 6 months 1 to 4   Vocational Retired   Comments pt is (I) with driving   Psychosocial   Psychosocial (WDL) WDL   Subjective   Subjective "I can't it hurts so bad"   ADL   Where Assessed   (bathroom)   Grooming Assistance 5  Supervision/Setup   LB Dressing Assistance 5  Supervision/Setup   Toileting Assistance  5  Supervision/Setup   Additional Comments performs ADL tasks at (S) level with increased pain    Bed Mobility   Supine to Sit 5  Supervision   Additional items Bedrails; Increased time required;Verbal cues   Sit to Supine 5  Supervision   Additional items Increased time required;Verbal cues; Bedrails   Additional Comments attempt to educate on the log roll technique; demonstrates understanding with poor follow through of technique due to pain   Transfers   Sit to Stand 5  Supervision   Additional items Verbal cues   Stand to Sit 5  Supervision   Additional items Verbal cues   Toilet transfer 5  Supervision   Additional items Standard toilet;Verbal cues   Additional Comments performs with standard walker this date due to increased pain   Functional Mobility   Functional Mobility 5  Supervision   Additional Comments performs mobility to and from bathroom with increased pain and use of standard walker; limited by pain and endurance   Additional items Standard walker   Balance   Static Sitting Good   Dynamic Sitting Good   Static Standing Fair +   Dynamic Standing Fair +   Ambulatory Fair   Activity Tolerance   Activity Tolerance Patient limited by fatigue;Patient limited by pain   RUE Assessment   RUE Assessment WFL   LUE Assessment   LUE Assessment WFL   Hand Function   Gross Motor Coordination Functional   Fine Motor Coordination Functional   Sensation   Light Touch No apparent deficits   Sharp/Dull No apparent deficits   Cognition   Overall Cognitive Status WFL   Arousal/Participation Alert   Attention Within functional limits   Orientation Level Oriented X4   Memory Within functional limits   Following Commands Follows all commands and directions without difficulty   Assessment   Limitation Decreased ADL status; Decreased UE strength;Decreased Safe judgement during ADL;Decreased endurance;Decreased self-care trans;Decreased high-level ADLs   Assessment Pt is a 62 y o  female seen for OT evaluation s/p admit to [facility] on 11/3/2019 w/ Acute hip pain, left  Comorbidities affecting pt's functional performance at time of assessment include: HTN and functional disorder of bladder, allergic rhinitis   Personal factors affecting pt at time of IE include:steps to enter environment, difficulty performing ADLS, difficulty performing IADLS , decreased initiation and engagement  and health management   Prior to admission, pt was (I) with ADLs and IADLs with use of standard walker during functional mobility  Upon evaluation: Pt requires (S) level with use of standard walker during functional mobility 2* the following deficits impacting occupational performance: weakness, decreased strength, decreased balance, decreased tolerance, impaired initiation, decreased safety awareness and increased pain  Pt to benefit from continued skilled OT tx while in the hospital to address deficits as defined above and maximize level of functional independence w ADL's and functional mobility  Occupational Performance areas to address include: grooming, bathing/shower, toilet hygiene, dressing, functional mobility, community mobility and clothing management  From OT standpoint, recommendation at time of d/c would be outpatient PT services  Goals   Patient Goals to go home    Short Term Goal  pt will increase independence with toilet transfers and hygiene to (I) level    Long Term Goal #1 pt will increase independence with functional mobility with walker to mod (I) level with decreased pain   Long Term Goal #2 pt will demonstrate UB/LB bathing and grooming tasks with or without AE   Long Term Goal pt will be educated in LB dressing/bathing equipment to promote (I) with increased back pain   Plan   Treatment Interventions ADL retraining;Functional transfer training;UE strengthening/ROM; Endurance training;Patient/family training;Equipment evaluation/education; Activityengagement; Compensatory technique education   Goal Expiration Date 11/18/19   OT Frequency 3-5x/wk   Recommendation   OT Discharge Recommendation Other (Comment)  (OP PT)   Barthel Index   Feeding 10   Bathing 0   Grooming Score 0   Dressing Score 5   Bladder Score 10   Bowels Score 10   Toilet Use Score 5   Transfers (Bed/Chair) Score 10   Mobility (Level Surface) Score 10   Stairs Score 0   Barthel Index Score 60     Pt will benefit from continued OT services in order to maximize (I) c ADL performance, FM c RW, and improve overall endurance/strength required to complete functional tasks in preparation for d/c  Pt left supine in bed at end of session; all needs within reach; all lines intact

## 2019-11-04 NOTE — PHYSICAL THERAPY NOTE
Physical Therapy Evaluation    Patient Name: Billy FARRIS Date: 11/4/2019     Problem List  Principal Problem:    Acute hip pain, left  Active Problems:    Essential hypertension    Ambulatory dysfunction       Past Medical History  Past Medical History:   Diagnosis Date    Allergic rhinitis     Functional disorder of bladder     Hypertension         Past Surgical History  Past Surgical History:   Procedure Laterality Date    HYSTERECTOMY  04/23/2019    total        11/04/19 0841   Note Type   Note type Eval/Treat   Pain Assessment   Pain Assessment 0-10   Pain Score Worst Possible Pain   Pain Type Acute pain   Pain Location Back; Hip   Pain Orientation Left   Home Living   Type of Home Mobile home   Home Layout One level;Stairs to enter with rails  (5 PATRICIO with HR)   Bathroom Shower/Tub Tub/shower unit   Bathroom Toilet Standard   Home Equipment Walker   Additional Comments pt does not use walker at baseline, however is currently d/t severe pain   Prior Function   Level of [place] Independent with ADLs and functional mobility   Lives With Alone   ADL Assistance Independent   IADLs Independent   Falls in the last 6 months 1 to 4   Comments pt (I) with driving   Restrictions/Precautions   Weight Bearing Precautions Per Order No   Other Precautions Fall Risk;Pain   Cognition   Overall Cognitive Status WFL   Attention Within functional limits   Orientation Level Oriented X4   Following Commands Follows all commands and directions without difficulty   RLE Assessment   RLE Assessment X  (at least 3/5; MMT not performed d/t pain)   LLE Assessment   LLE Assessment X  (at least 3/5; MMT not performed d/t pain)   Coordination   Movements are Fluid and Coordinated 1   Sensation WFL   Bed Mobility   Supine to Sit 5  Supervision   Additional items Verbal cues; Increased time required; Bedrails   Sit to Supine 5  Supervision   Additional items Verbal cues;Increased time required; Bedrails   Transfers   Sit to Stand 5  Supervision   Additional items Verbal cues; Increased time required   Stand to Sit 5  Supervision   Additional items Verbal cues; Increased time required   Toilet transfer 5  Supervision   Additional items Standard toilet   Ambulation/Elevation   Gait pattern Antalgic; Excessively slow; Short stride; Foward flexed;Decreased L stance   Gait Assistance 5  Supervision  (close SUP)   Additional items Verbal cues   Assistive Device Standard walker   Distance 30'   Balance   Static Sitting Good   Dynamic Sitting Good   Static Standing Fair +   Dynamic Standing Fair +   Ambulatory Fair  (with SW)   Endurance Deficit   Endurance Deficit Yes   Endurance Deficit Description pt limited by severe pain   Activity Tolerance   Activity Tolerance Patient limited by pain   Assessment   Prognosis Guarded   Problem List Decreased strength;Decreased range of motion;Decreased endurance; Impaired balance;Decreased mobility;Pain   Assessment Patient is a 62 y o  female evaluated by Physical Therapy s/p admit to [place] [address] on 11/3/2019 with admitting diagnosis of: Back pain, Abdominal tenderness, Acute hip pain, left, Ambulatory dysfunction, and principal problem of: Acute hip pain, left  PT was consulted to assess patient's functional mobility and discharge needs  Ordered are PT Evaluation and treatment with activity level of: up with assistance  Comorbidities affecting patient's physical performance at time of assessment include: HTN  Personal factors affecting the patient at time of IE include: step(s) to enter home and lives alone  Please locate objective findings from PT assessment regarding body systems outlined above  Upon evaluation, pt able to perform all functional mobility with SUP and standard walker, however pt severely limited by pain  Increased time required to perform tasks d/t sharp pain with movement   Pt states this pain is very similar to when she tore her L hamstring about 10 years ago  Pt declining further ambulation or sitting OOB in recliner at this time  Recommend continued PT intervention to encourage increased mobility when pain is better controled  D/c recommendation at this time is OP PT  Goals   Patient Goals to go home  LTG Expiration Date 11/18/19   Long Term Goal #1 Pt will perform bed mobility and all functional transfers mod(I) with good safety awareness  Long Term Goal #2 Pt will ambulate 150' with LRAD and SUP with decreased reports of pain by two levels  Plan   Treatment/Interventions Functional transfer training;LE strengthening/ROM; Elevations; Therapeutic exercise; Endurance training;Bed mobility;Gait training   PT Frequency   (3-5x/week)   Recommendation   Recommendation Outpatient PT   PT - OK to Discharge No  (when medically appropriate)     Pt supine in bed at end of session with call bell and all other needs within reach

## 2019-11-04 NOTE — SOCIAL WORK
Chart reviewed by case management,assessment completed at the bedside, pt lives alone in a 1 story home, no steps inside and 5 steps outside, pt drives and is independent and she works in [employer], pt smokes 1 pkg cig/day, pt stated" I do not want to quit", pt denies any alcohol use, pt has hx of anxiety, pt has a rx [facility], pt has a walker, pt states that he fiance does spend a lot of time with her at her home, she stated he would be able to transport the pt home when stable for d/c, cm will continue to follow and assess for any addiitonal d/c needs , tentative d/c for today as discussed at care coordination rounds today,   Patient/caregiver received discharge checklist   Content reviewed  Patient/caregiver encouraged to participate in discharge plan of care prior to discharge home  CM reviewed d/c planning process including the following: identifying help at home, patient preference for d/c planning needs, availability of treatment team to discuss questions or concerns patient and/or family may have regarding understanding medications and recognizing signs and symptoms once discharged  CM also encouraged patient to follow up with all recommended appointments after discharge  Patient advised of importance for patient and family to participate in managing patients medical well being

## 2019-11-04 NOTE — ASSESSMENT & PLAN NOTE
Patient reports increased weakness and left lower extremity  Patient admits to pain in lumbar region left hip  Unable to fully assess due to acute pain  PT/OT consulted  Ambulate with assist at all times  Consulted Ortho for evaluation

## 2019-11-04 NOTE — PLAN OF CARE
Problem: DISCHARGE PLANNING - CARE MANAGEMENT  Goal: Discharge to post-acute care or home with appropriate resources  Description  INTERVENTIONS:  - Conduct assessment to determine patient/family and health care team treatment goals, and need for post-acute services based on payer coverage, community resources, and patient preferences, and barriers to discharge  - Address psychosocial, clinical, and financial barriers to discharge as identified in assessment in conjunction with the patient/family and health care team  - Arrange appropriate level of post-acute services according to patient's   needs and preference and payer coverage in collaboration with the physician and health care team  - Communicate with and update the patient/family, physician, and health care team regarding progress on the discharge plan  - Arrange appropriate transportation to post-acute venues    Pt's goal is to return home with outpt service   Outcome: Progressing

## 2019-11-04 NOTE — CONSULTS
Orthopedics   Monik Faye 62 y o  female MRN: [id number]  Unit/Bed#: 415-01      Chief Complaint:   left hip pain    HPI:   62 y  o female complaining of left hip pain  Patient has long history of back pain almost 10 years duration  Patient also has discomfort in both hips for many years  Patient woke up on Sunday with increasing pain in the outer aspect of the left hip  Patient has difficulty ambulating  Patient presents to the ER ambulated dysfunction  Patient admitted in the care of the medical team with pain in the left hip    Review Of Systems:   · Skin: Normal  · Neuro: See HPI  · Musculoskeletal: See HPI  · 14 point review of systems negative except as stated above     Past Medical History:   Past Medical History:   Diagnosis Date    Allergic rhinitis     Functional disorder of bladder     Hypertension        Past Surgical History:   Past Surgical History:   Procedure Laterality Date    HYSTERECTOMY  04/23/2019    total       Family History:  Family history reviewed and non-contributory  Family History   Problem Relation Age of Onset    Breast cancer Maternal Aunt        Social History:  Social History     Socioeconomic History    Marital status:       Spouse name: None    Number of children: None    Years of education: None    Highest education level: None   Occupational History    None   Social Needs    Financial resource strain: None    Food insecurity:     Worry: None     Inability: None    Transportation needs:     Medical: None     Non-medical: None   Tobacco Use    Smoking status: Current Every Day Smoker     Packs/day: 1 00     Years: 42 00     Pack years: 42 00    Smokeless tobacco: Never Used   Substance and Sexual Activity    Alcohol use: Yes     Comment: Occasionally     Drug use: Never    Sexual activity: None   Lifestyle    Physical activity:     Days per week: None     Minutes per session: None    Stress: None   Relationships    Social connections:     Talks on phone: None     Gets together: None     Attends Buddhism service: None     Active member of club or organization: None     Attends meetings of clubs or organizations: None     Relationship status: None    Intimate partner violence:     Fear of current or ex partner: None     Emotionally abused: None     Physically abused: None     Forced sexual activity: None   Other Topics Concern    None   Social History Narrative    None       Allergies:   No Known Allergies        Labs:  0   Lab Value Date/Time    HCT 38 6 11/04/2019 0503    HCT 39 3 11/03/2019 0432    HGB 12 8 11/04/2019 0503    HGB 13 1 11/03/2019 0432    WBC 10 22 (H) 11/04/2019 0503    WBC 12 37 (H) 11/03/2019 0432       Meds:    Current Facility-Administered Medications:     baclofen tablet 5 mg, 5 mg, Oral, TID, Minna Lawrence, MITCHNP, 5 mg at 11/04/19 0929    citalopram (CeleXA) tablet 20 mg, 20 mg, Oral, Daily, Minna Lawrence, JOSE GUADALUPE, 20 mg at 11/04/19 0925    enoxaparin (LOVENOX) subcutaneous injection 40 mg, 40 mg, Subcutaneous, Daily, Minna Lawrence, JOSE GUADALUPE, 40 mg at 11/04/19 0934    HYDROmorphone (DILAUDID) injection 0 5 mg, 0 5 mg, Intravenous, Q4H PRN, Minna Lawrence, JOSE GUADALUPE, 0 5 mg at 11/04/19 1441    lidocaine (XYLOCAINE) 1 % injection 10 mL, 10 mL, Infiltration, Once, Usha Wallace MD    losartan (COZAAR) tablet 100 mg, 100 mg, Oral, Daily, JOSE GUADALUPE Redd, 100 mg at 11/04/19 8265    methylPREDNISolone acetate (DEPO-MEDROL) injection 80 mg, 80 mg, Intra-articular, Once, Usha Walalce MD    nicotine (NICODERM CQ) 21 mg/24 hr TD 24 hr patch 1 patch, 1 patch, Transdermal, Daily, JOSE GUADALUPE Redd    triamterene-hydrochlorothiazide (MAXZIDE-25) 37 5-25 mg per tablet 2 tablet, 2 tablet, Oral, Daily, Minna Lawrence, JOSE GUADALUPE, 2 tablet at 11/04/19 0929    Blood Culture:   No results found for: BLOODCX    Wound Culture:   No results found for: WOUNDCULT    Ins and Outs:  I/O last 24 hours:   In: 860 [P O :860]  Out: -           Physical Exam:   /62   Pulse 71   Temp 98 2 °F (36 8 °C) (Oral)   Resp 17   Ht 5' 3" (1 6 m)   Wt 89 4 kg (197 lb 1 5 oz)   SpO2 94%   BMI 34 91 kg/m²   Gen: Alert and oriented to person, place, time  HEENT: EOMI, eyes clear, moist mucus membranes, hearing intact  Respiratory: Bilateral chest rise  No audible wheezing found  Cardiovascular: Regular Rate and Rhythm  Abdomen: soft nontender/nondistended  Musculoskeletal: left lower extremity    · Skin pink, dry, and intact, no erythema  · Tenderness present greater trochanteric region in the bursa  · Painfree range of motion of hip joint but no micromotion tenderness  · Sensation intact L3-S1  · 5/5 motor strength to hip flexion/extension, knee flexion/extension, ankle dorsi/plantar flexion  · Pain with flexion/adduction  · 2+ DP pulse    Radiology:   I personally reviewed the films  X-rays of left hip shows early DJD    Assessment:  62 y  o female with left hip trochanteric bursitis with pre-existing osteoarthritis chronic back pain    Plan:   · WBAT left lower extremity  · PT  · Pain control  · Body mass index is 34 91 kg/m²  moderately obese  Recommend nutrition    · Dispo: Ortho will follow  · Will plan for injection of left trochanteric bursa with Depo-Medrol and local anesthetic after radiographs of the left hip are obtained      Salena Hill MD

## 2019-11-05 ENCOUNTER — TRANSITIONAL CARE MANAGEMENT (OUTPATIENT)
Dept: FAMILY MEDICINE CLINIC | Facility: CLINIC | Age: 64
End: 2019-11-05

## 2019-11-06 ENCOUNTER — OFFICE VISIT (OUTPATIENT)
Dept: FAMILY MEDICINE CLINIC | Facility: CLINIC | Age: 64
End: 2019-11-06
Payer: COMMERCIAL

## 2019-11-06 VITALS
DIASTOLIC BLOOD PRESSURE: 78 MMHG | HEIGHT: 63 IN | BODY MASS INDEX: 34.55 KG/M2 | WEIGHT: 195 LBS | SYSTOLIC BLOOD PRESSURE: 128 MMHG

## 2019-11-06 DIAGNOSIS — K59.03 DRUG-INDUCED CONSTIPATION: ICD-10-CM

## 2019-11-06 DIAGNOSIS — M25.552 LEFT HIP PAIN: Primary | ICD-10-CM

## 2019-11-06 DIAGNOSIS — N39.3 STRESS INCONTINENCE OF URINE: Chronic | ICD-10-CM

## 2019-11-06 PROCEDURE — 1111F DSCHRG MED/CURRENT MED MERGE: CPT | Performed by: FAMILY MEDICINE

## 2019-11-06 PROCEDURE — 99496 TRANSJ CARE MGMT HIGH F2F 7D: CPT | Performed by: FAMILY MEDICINE

## 2019-11-06 RX ORDER — TRAMADOL HYDROCHLORIDE 50 MG/1
50 TABLET ORAL 2 TIMES DAILY
Qty: 30 TABLET | Refills: 1 | Status: SHIPPED | OUTPATIENT
Start: 2019-11-06 | End: 2019-12-16 | Stop reason: ALTCHOICE

## 2019-11-06 NOTE — PROGRESS NOTES
TCM Call (since 10/6/2019)     Date and time call was made  11/5/2019  8:38 AM    Hospital care reviewed  Records reviewed    Patient was hospitialized at  [address]    Date of Admission  11/04/19    Date of discharge  11/05/19    Diagnosis  Acute hip pain, left    Disposition  Home    Were the patients medications reviewed and updated  No    Current Symptoms  Leg pain - left side    Left side leg pain severity  Moderate    Leg pain, left side, onset  Sudden      TCM Call (since 10/6/2019)     Post hospital issues  None    Should patient be enrolled in anticoag monitoring? No    Scheduled for follow up?   Yes    Patients specialists  Other (comment)    Other specialists names  ortho    Did you obtain your prescribed medications  Yes    Do you need help managing your prescriptions or medications  No    Is transportation to your appointment needed  No    I have advised the patient to call PCP with any new or worsening symptoms  [facility] [address] or [facility] other    Support System  Partner    The type of support provided  Emotional; Physical; Financial    Do you have social support  Yes, as much as I need    Are you recieving any outpatient services  No    Are you recieving home care services  No    Are you using any community resources  No    Current waiver services  No    Have you fallen in the last 12 months  Yes    How many times  1    Interperter language line needed  No    Counseling  Patient

## 2019-11-06 NOTE — PATIENT INSTRUCTIONS
Patient is having some trouble with constipation and should get MiraLax and take 1 cap twice a day until the bowels start moving  If has residual constipation may take 1 cap daily  Gave prescription for tramadol 50 mg to try taking 1 twice a day and may continue the ibuprofen as long as it does not upset the stomach  Is going to get form for a temporary handicap parking sticker    Will recheck here on Mon

## 2019-11-06 NOTE — PROGRESS NOTES
Assessment/Plan:    Stress incontinence of urine  I do not feel the urinary symptoms relate to the back pain will continue current regimen for the stress incontinence    Acute hip pain, left  Discussed problem will try adding tramadol 50 mg twice a day may continue the ibuprofen  Not sure if the muscle relaxant is helping but may continue  Reviewed available reports and reconciled medication    Drug-induced constipation  Is no longer taking the oxycodone may try using MiraLax 1 cap twice a day until stools loosen       Diagnoses and all orders for this visit:    Left hip pain  -     traMADol (ULTRAM) 50 mg tablet; Take 1 tablet (50 mg total) by mouth 2 (two) times a day    Stress incontinence of urine    Drug-induced constipation          Subjective:      Patient ID: Melissa Nicholson is a 62 y o  female  Patient has history of hypertension, reflux esophagitis, allergic rhinitis, urinary incontinence, stress disorder and hypercholesterolemia  Has longstanding problems with hip pain primarily on the left side which became much more severe had also then developed problems with right hip pain and had gone to the ER and was admitted was given injection in the left hip  Which today seems to have helped some but is still having a lot of discomfort and difficulty walking  Is using a walker      The following portions of the patient's history were reviewed and updated as appropriate: allergies, current medications, past medical history, past social history and problem list     Review of Systems   Constitutional: Positive for fatigue (With use of the muscle relaxant)  Negative for chills  Gastrointestinal: Positive for blood in stool and constipation  Negative for abdominal pain  Endocrine: Positive for polyuria ( although does drink a lot of fluids and has nocturia x3)  Genitourinary: Negative for difficulty urinating  Musculoskeletal: Positive for arthralgias, back pain, gait problem and myalgias   Negative for joint swelling  Skin: Negative for rash  Neurological: Negative for dizziness, weakness, light-headedness and numbness  Hematological: Negative for adenopathy  Psychiatric/Behavioral: Positive for agitation and dysphoric mood  The patient is nervous/anxious  Objective:      /78 (BP Location: Right arm, Patient Position: Sitting, Cuff Size: Large)   Ht 5' 3" (1 6 m)   Wt 88 5 kg (195 lb)   BMI 34 54 kg/m²          Physical Exam   Constitutional: She appears well-developed and well-nourished  HENT:   Head: Normocephalic  Nose: Nose normal    Mouth/Throat: Oropharynx is clear and moist    Eyes: Conjunctivae are normal    Neck: Neck supple  No thyromegaly present  Cardiovascular: Normal rate, regular rhythm and normal heart sounds  No murmur (Rate is 78) heard  Pulmonary/Chest: Effort normal and breath sounds normal    Abdominal: Soft  She exhibits no distension and no mass  There is no tenderness  Musculoskeletal: She exhibits tenderness ( to palpation in the lateral portion of the left hip and in the sacroiliac joint as well as tenderness to rotational motion of the left hip joint)  She exhibits no edema  Lymphadenopathy:     She has no cervical adenopathy  Neurological: She is alert  She displays normal reflexes  No sensory deficit  Coordination abnormal    Skin: Skin is warm and dry  No rash noted  Psychiatric: Her speech is normal and behavior is normal  Judgment and thought content normal  Her mood appears anxious  Her affect is not inappropriate  Cognition and memory are normal    Nursing note and vitals reviewed

## 2019-11-07 PROBLEM — K59.03 DRUG-INDUCED CONSTIPATION: Status: ACTIVE | Noted: 2019-11-07

## 2019-11-07 NOTE — ASSESSMENT & PLAN NOTE
I do not feel the urinary symptoms relate to the back pain will continue current regimen for the stress incontinence

## 2019-11-07 NOTE — ASSESSMENT & PLAN NOTE
Discussed problem will try adding tramadol 50 mg twice a day may continue the ibuprofen  Not sure if the muscle relaxant is helping but may continue    Reviewed available reports and reconciled medication

## 2019-11-12 ENCOUNTER — OFFICE VISIT (OUTPATIENT)
Dept: FAMILY MEDICINE CLINIC | Facility: CLINIC | Age: 64
End: 2019-11-12
Payer: COMMERCIAL

## 2019-11-12 VITALS
DIASTOLIC BLOOD PRESSURE: 76 MMHG | SYSTOLIC BLOOD PRESSURE: 128 MMHG | WEIGHT: 195 LBS | BODY MASS INDEX: 34.55 KG/M2 | HEIGHT: 63 IN

## 2019-11-12 DIAGNOSIS — F41.1 GENERALIZED ANXIETY DISORDER: Chronic | ICD-10-CM

## 2019-11-12 DIAGNOSIS — K59.03 DRUG-INDUCED CONSTIPATION: ICD-10-CM

## 2019-11-12 DIAGNOSIS — M25.552 ACUTE HIP PAIN, LEFT: Primary | ICD-10-CM

## 2019-11-12 DIAGNOSIS — R26.2 AMBULATORY DYSFUNCTION: ICD-10-CM

## 2019-11-12 PROCEDURE — 99214 OFFICE O/P EST MOD 30 MIN: CPT | Performed by: FAMILY MEDICINE

## 2019-11-12 NOTE — PROGRESS NOTES
Assessment/Plan:    Acute hip pain, left  Slightly improved but continues to be problem may continue the Advil and the tramadol and follow-up with Orthopedics    Ambulatory dysfunction  Discussed problem I did fill out form for disabled sticker  Use walker as needed will not do physical therapy at this point    Drug-induced constipation  This has resolved may use the MiraLax if needed    Generalized anxiety disorder  Has been somewhat stressed related to work will continue baseline meds at this time       Diagnoses and all orders for this visit:    Acute hip pain, left    Ambulatory dysfunction    Drug-induced constipation    Generalized anxiety disorder          Subjective:      Patient ID: Aimee Lopez is a 62 y o  female  Patient seen for recheck for problems with left hip pain and left low back pain  Continues to have trouble with her walking and is intermittently using walker does feel the tramadol has helped and has request for handicap parking sticker  Does not notice any actual numbness or weakness but does feel sometimes her left leg gives out  Has made decision that she is probably going to quit her job  Does need to straight now her insurance and is set up to see orthopedist at the beginning of December  Did use the MiraLax and the constipation seems to have resolved      The following portions of the patient's history were reviewed and updated as appropriate: allergies, current medications, past medical history, past social history and problem list     Review of Systems   Constitutional: Positive for activity change  Cardiovascular: Negative for leg swelling  Gastrointestinal: Negative for abdominal pain and diarrhea  Endocrine: Negative for polyuria  Genitourinary: Negative for difficulty urinating  Musculoskeletal: Positive for arthralgias, gait problem and myalgias  Skin: Negative for rash     Neurological: Negative for weakness (But does not feel stable with her left leg) and numbness  Psychiatric/Behavioral: Positive for agitation  Objective:      /76 (BP Location: Right arm, Patient Position: Sitting, Cuff Size: Large)   Ht 5' 3" (1 6 m)   Wt 88 5 kg (195 lb)   BMI 34 54 kg/m²          Physical Exam   Constitutional: She appears well-developed and well-nourished  HENT:   Head: Normocephalic  Mouth/Throat: Oropharynx is clear and moist    Eyes: Conjunctivae are normal    Neck: Neck supple  No thyromegaly present  Cardiovascular: Normal rate, regular rhythm and normal heart sounds  No murmur heard  Pulmonary/Chest: Effort normal and breath sounds normal    Abdominal: Soft  She exhibits no distension and no mass  There is no tenderness  Musculoskeletal: She exhibits no edema  Legs:  Lymphadenopathy:     She has no cervical adenopathy  Neurological: She is alert  She displays normal reflexes  Coordination abnormal    Skin: Skin is warm and dry  Psychiatric: She has a normal mood and affect  Her behavior is normal  Judgment and thought content normal    Nursing note and vitals reviewed

## 2019-11-12 NOTE — ASSESSMENT & PLAN NOTE
Discussed problem I did fill out form for disabled sticker    Use walker as needed will not do physical therapy at this point

## 2019-11-12 NOTE — PATIENT INSTRUCTIONS
Continues to have trouble with the left hip and low back and is intermittently using the walker for ambulation  May continue the Aleve or Advil and the tramadol and try to push activity as tolerated    Is due for follow-up with orthopedist early in December

## 2019-11-12 NOTE — ASSESSMENT & PLAN NOTE
Slightly improved but continues to be problem may continue the Advil and the tramadol and follow-up with Orthopedics

## 2019-11-25 DIAGNOSIS — I10 ESSENTIAL HYPERTENSION: Primary | Chronic | ICD-10-CM

## 2019-11-25 RX ORDER — LOSARTAN POTASSIUM 100 MG/1
100 TABLET ORAL DAILY
Qty: 90 TABLET | Refills: 3 | Status: SHIPPED | OUTPATIENT
Start: 2019-11-25 | End: 2020-11-30 | Stop reason: SDUPTHER

## 2019-12-13 ENCOUNTER — OFFICE VISIT (OUTPATIENT)
Dept: OBGYN CLINIC | Facility: CLINIC | Age: 64
End: 2019-12-13
Payer: COMMERCIAL

## 2019-12-13 ENCOUNTER — APPOINTMENT (OUTPATIENT)
Dept: RADIOLOGY | Facility: MEDICAL CENTER | Age: 64
End: 2019-12-13
Payer: COMMERCIAL

## 2019-12-13 VITALS — HEART RATE: 79 BPM | SYSTOLIC BLOOD PRESSURE: 128 MMHG | DIASTOLIC BLOOD PRESSURE: 76 MMHG

## 2019-12-13 DIAGNOSIS — M54.9 CHRONIC BACK PAIN, UNSPECIFIED BACK LOCATION, UNSPECIFIED BACK PAIN LATERALITY: ICD-10-CM

## 2019-12-13 DIAGNOSIS — G89.29 CHRONIC BACK PAIN, UNSPECIFIED BACK LOCATION, UNSPECIFIED BACK PAIN LATERALITY: ICD-10-CM

## 2019-12-13 DIAGNOSIS — M70.62 GREATER TROCHANTERIC BURSITIS OF LEFT HIP: Primary | ICD-10-CM

## 2019-12-13 PROCEDURE — 20610 DRAIN/INJ JOINT/BURSA W/O US: CPT | Performed by: ORTHOPAEDIC SURGERY

## 2019-12-13 PROCEDURE — 99203 OFFICE O/P NEW LOW 30 MIN: CPT | Performed by: ORTHOPAEDIC SURGERY

## 2019-12-13 PROCEDURE — 72110 X-RAY EXAM L-2 SPINE 4/>VWS: CPT

## 2019-12-13 RX ORDER — LIDOCAINE HYDROCHLORIDE 10 MG/ML
4 INJECTION, SOLUTION INFILTRATION; PERINEURAL
Status: COMPLETED | OUTPATIENT
Start: 2019-12-13 | End: 2019-12-13

## 2019-12-13 RX ORDER — BETAMETHASONE SODIUM PHOSPHATE AND BETAMETHASONE ACETATE 3; 3 MG/ML; MG/ML
12 INJECTION, SUSPENSION INTRA-ARTICULAR; INTRALESIONAL; INTRAMUSCULAR; SOFT TISSUE
Status: COMPLETED | OUTPATIENT
Start: 2019-12-13 | End: 2019-12-13

## 2019-12-13 RX ADMIN — LIDOCAINE HYDROCHLORIDE 4 ML: 10 INJECTION, SOLUTION INFILTRATION; PERINEURAL at 09:32

## 2019-12-13 RX ADMIN — BETAMETHASONE SODIUM PHOSPHATE AND BETAMETHASONE ACETATE 12 MG: 3; 3 INJECTION, SUSPENSION INTRA-ARTICULAR; INTRALESIONAL; INTRAMUSCULAR; SOFT TISSUE at 09:32

## 2019-12-13 NOTE — PROGRESS NOTES
Chief Complaint   left lateral hip pain   low back pain radiating to the left calf    History Of Presenting Illness  Slade Butler 1955 presents with  Left lateral hip pain due to trochanteric bursitis  Good initial response to steroid injection  Patient has recurrence of the pain in the left lateral trochanteric region  Patient is now able to sleep on the left side  Patient also has low back pain which radiates to the left calf  Aggravated by walking  Decreased with rest       Current Medications  Current Outpatient Medications   Medication Sig Dispense Refill    CALCIUM CARBONATE-VITAMIN D PO Take 1 tablet by mouth 2 (two) times a day      citalopram (CeleXA) 20 mg tablet Take 1 tablet (20 mg total) by mouth daily 30 tablet 5    losartan (COZAAR) 100 MG tablet Take 1 tablet (100 mg total) by mouth daily 90 tablet 3    multivitamin (THERAGRAN) TABS Take 1 tablet by mouth daily      omeprazole (PriLOSEC OTC) 20 MG tablet Take 20 mg by mouth daily      psyllium (METAMUCIL) 58 6 % packet Take 1 packet by mouth 2 (two) times a day      sodium chloride (OCEAN) 0 65 % nasal spray 1 spray into each nostril as needed for congestion      triamterene-hydrochlorothiazide (MAXZIDE) 75-50 MG per tablet Take 1 tablet by mouth daily 30 tablet 5    atorvastatin (LIPITOR) 10 mg tablet Take 1 tablet (10 mg total) by mouth daily (Patient not taking: Reported on 12/13/2019) 30 tablet 5    baclofen 5 MG TABS Take 5 mg by mouth 3 (three) times a day (Patient not taking: Reported on 12/13/2019) 15 tablet 0    hydrOXYzine HCL (ATARAX) 50 mg tablet Take 1 tablet by mouth daily      traMADol (ULTRAM) 50 mg tablet Take 1 tablet (50 mg total) by mouth 2 (two) times a day (Patient not taking: Reported on 12/13/2019) 30 tablet 1     No current facility-administered medications for this visit          Current Problems    Active Problems:   Patient Active Problem List    Diagnosis Date Noted    Drug-induced constipation 11/07/2019    Acute hip pain, left 11/03/2019    Ambulatory dysfunction 11/03/2019    Essential hypertension 08/08/2019    Hypercholesterolemia 08/08/2019    Seasonal allergic rhinitis due to pollen 08/08/2019    Generalized anxiety disorder 08/08/2019    Stress incontinence of urine 08/08/2019    Gastro-esophageal reflux disease with esophagitis 08/08/2019         Review of Systems:    General: negative for - chills, fatigue, fever,  weight gain or weight loss  Psychological: negative for - anxiety, behavioral disorder, concentration difficulties  Ophthalmic: negative for - blurry vision, decreased vision, double vision,      Past Medical History:   Past Medical History:   Diagnosis Date    Allergic rhinitis     Functional disorder of bladder     Hypertension        Past Surgical History:   Past Surgical History:   Procedure Laterality Date    HYSTERECTOMY  04/23/2019    total       Family History:  Family history reviewed and non-contributory  Family History   Problem Relation Age of Onset    Breast cancer Maternal Aunt        Social History:  Social History     Socioeconomic History    Marital status:       Spouse name: None    Number of children: None    Years of education: None    Highest education level: None   Occupational History    None   Social Needs    Financial resource strain: None    Food insecurity:     Worry: None     Inability: None    Transportation needs:     Medical: None     Non-medical: None   Tobacco Use    Smoking status: Current Every Day Smoker     Packs/day: 1 00     Years: 42 00     Pack years: 42 00    Smokeless tobacco: Never Used   Substance and Sexual Activity    Alcohol use: Yes     Comment: Occasionally     Drug use: Never    Sexual activity: None   Lifestyle    Physical activity:     Days per week: None     Minutes per session: None    Stress: None   Relationships    Social connections:     Talks on phone: None     Gets together: None     Attends Confucianism service: None     Active member of club or organization: None     Attends meetings of clubs or organizations: None     Relationship status: None    Intimate partner violence:     Fear of current or ex partner: None     Emotionally abused: None     Physically abused: None     Forced sexual activity: None   Other Topics Concern    None   Social History Narrative    None       Allergies:   No Known Allergies        Physical ExaminationBP 128/76 (BP Location: Left arm, Patient Position: Sitting, Cuff Size: Large)   Pulse 79   Gen: Alert and oriented to person, place, time  HEENT: EOMI, eyes clear, moist mucus membranes, hearing intact      Orthopedic Exam    Lumbosacral spine normal to examine mild tenderness in the paraspinal muscles   straight leg raise is 90 degrees negative stretch test grade 5 motor power in all muscle groups   hip pain-free range of motion   tenderness present over the left greater trochanter          Impression   back pain with left trochanteric bursitis          Plan     discussed treatment with the patient   referred to physical therapy and pain management   patient aware of the importance of weight loss, over-the-counter pain medication, icing and regular exercise   left trochanteric bursa injected with steroid and local anesthetic   follow-up in 3 months  Large joint arthrocentesis: L hip joint  Date/Time: 12/13/2019 9:32 AM  Consent given by: patient  Site marked: site marked  Timeout: Immediately prior to procedure a time out was called to verify the correct patient, procedure, equipment, support staff and site/side marked as required   Supporting Documentation  Indications: pain and diagnostic evaluation   Procedure Details  Location: hip - L hip joint  Preparation: Patient was prepped and draped in the usual sterile fashion  Needle size: 22 G  Ultrasound guidance: no  Approach: lateral  Medications administered: 4 mL lidocaine 1 %; 12 mg betamethasone acetate-betamethasone sodium phosphate 6 (3-3) mg/mL    Patient tolerance: patient tolerated the procedure well with no immediate complications  Dressing:  Sterile dressing applied        Prabhjot Garcia MD        Portions of the record may have been created with voice recognition software  Occasional wrong word or "sound a like" substitutions may have occurred due to the inherent limitations of voice recognition software  Read the chart carefully and recognize, using context, where substitutions have occurred

## 2019-12-13 NOTE — PATIENT INSTRUCTIONS
Hip Bursitis   WHAT YOU NEED TO KNOW:   Hip bursitis is inflammation of the bursa in your hip  The bursa is a fluid-filled sac that acts as a cushion between a bone and a tendon  A tendon is a cord of strong tissue that connects muscles to bones  DISCHARGE INSTRUCTIONS:   Medicines:   · NSAIDs:  These medicines decrease swelling, pain, and fever  NSAIDs are available without a doctor's order  Ask your healthcare provider which medicine is right for you  Ask how much to take and when to take it  Take as directed  NSAIDs can cause stomach bleeding and kidney problems if not taken correctly  · Antibiotics: These help fight an infection caused by bacteria  You may need antibiotics if your bursitis is caused by infection  · Take your medicine as directed  Contact your healthcare provider if you think your medicine is not helping or if you have side effects  Tell him of her if you are allergic to any medicine  Keep a list of the medicines, vitamins, and herbs you take  Include the amounts, and when and why you take them  Bring the list or the pill bottles to follow-up visits  Carry your medicine list with you in case of an emergency  Manage your symptoms:   · Rest:  Rest your hip as much as possible to decrease pain and swelling  Slowly start to do more each day  Return to your daily activities as directed  · Ice:  Ice helps decrease swelling and pain  Ice may also help prevent tissue damage  Use an ice pack, or put crushed ice in a plastic bag  Cover it with a towel and place it on your hip for 15 to 20 minutes, 3 to 4 times each day, as directed  · Sleep position:  Do not lie on your injured hip  You may be more comfortable if you sleep on your stomach or back  · Physical therapy:  A physical therapist teaches you exercises to help improve movement and strength, and to decrease pain    Prevent another hip injury:   · Stretch, warm up, and cool down:  Always stretch and do warmup and cool-down exercises before and after you exercise  This will help loosen your muscles and decrease stress on your hip  Rest between workouts  · Wear proper shoes:  Wear shoes that fit properly and support your feet  You may need to wear shoe inserts called orthotics  Orthotics help position your foot correctly as you walk or exercise  · Maintain a healthy weight:  Ask your healthcare provider how much you should weigh  Ask him to help you create a weight loss plan if you are overweight  · Keep pressure off your hips:  Do not stand or sit for long periods of time  Sit on padded surfaces, such as a cushion or pad, whenever possible  Bend your knees when you  objects from the ground  Follow up with your healthcare provider as directed:  Write down your questions so you remember to ask them during your visits  Contact your healthcare provider if:   · Your pain and swelling increase  · Your symptoms do not improve with treatment  · You have a fever  · You have questions or concerns about your condition or care  © 2017 [facility] [place] Information is for End User's use only and may not be sold, redistributed or otherwise used for commercial purposes  All illustrations and images included in CareNotes® are the copyrighted property of [employer] , Belly Ballot  or Nirav Garcia  The above information is an  only  It is not intended as medical advice for individual conditions or treatments  Talk to your doctor, nurse or pharmacist before following any medical regimen to see if it is safe and effective for you

## 2019-12-16 ENCOUNTER — OFFICE VISIT (OUTPATIENT)
Dept: FAMILY MEDICINE CLINIC | Facility: CLINIC | Age: 64
End: 2019-12-16
Payer: COMMERCIAL

## 2019-12-16 VITALS — WEIGHT: 194 LBS | DIASTOLIC BLOOD PRESSURE: 76 MMHG | BODY MASS INDEX: 34.37 KG/M2 | SYSTOLIC BLOOD PRESSURE: 126 MMHG

## 2019-12-16 DIAGNOSIS — Z00.00 WELLNESS EXAMINATION: ICD-10-CM

## 2019-12-16 DIAGNOSIS — F41.1 GENERALIZED ANXIETY DISORDER: Chronic | ICD-10-CM

## 2019-12-16 DIAGNOSIS — K21.00 GASTRO-ESOPHAGEAL REFLUX DISEASE WITH ESOPHAGITIS: Chronic | ICD-10-CM

## 2019-12-16 DIAGNOSIS — E78.00 HYPERCHOLESTEROLEMIA: Chronic | ICD-10-CM

## 2019-12-16 DIAGNOSIS — I10 ESSENTIAL HYPERTENSION: Primary | Chronic | ICD-10-CM

## 2019-12-16 PROCEDURE — 3074F SYST BP LT 130 MM HG: CPT | Performed by: FAMILY MEDICINE

## 2019-12-16 PROCEDURE — 3078F DIAST BP <80 MM HG: CPT | Performed by: FAMILY MEDICINE

## 2019-12-16 PROCEDURE — 99213 OFFICE O/P EST LOW 20 MIN: CPT | Performed by: FAMILY MEDICINE

## 2019-12-16 NOTE — PROGRESS NOTES
Assessment/Plan:    Essential hypertension  Blood pressure seems stable will continue current regimen  Watch salt in diet and push for weight loss    Gastro-esophageal reflux disease with esophagitis  Asymptomatic continue current regimen    Generalized anxiety disorder  Seemingly doing well continue current regimen    Hypercholesterolemia  Will check lipid panel continue baseline meds       Diagnoses and all orders for this visit:    Essential hypertension    Hypercholesterolemia    Wellness examination  -     Renal function panel; Future  -     Lipid panel; Future    Gastro-esophageal reflux disease with esophagitis    Generalized anxiety disorder          Subjective:      Patient ID: Bettie Pelletier is a 62 y o  female  Patient has history of hypertension, reflux esophagitis, allergic rhinitis, urinary incontinence, stress disorder and hypercholesterolemia  Has not been taking her cholesterol medication but has been taking fish oil  Overall the stresses been doing well and is doing well with urinary incontinence  No trouble with heartburn allergies doing okay has been having trouble with low back pain and left hip pain although is doing much better since injection in her left  Hip  Is no longer working      The following portions of the patient's history were reviewed and updated as appropriate: allergies, current medications, past medical history, past social history and problem list     Review of Systems   Constitutional: Negative for activity change, appetite change, chills, fatigue, fever and unexpected weight change  HENT: Negative for congestion and rhinorrhea  Eyes: Negative for visual disturbance  Respiratory: Negative for apnea, cough, chest tightness and shortness of breath  Cardiovascular: Negative for chest pain, palpitations and leg swelling  Gastrointestinal: Negative for abdominal distention, abdominal pain, constipation and diarrhea  Endocrine: Negative for polyuria (Nocturia x1)  Genitourinary: Negative for difficulty urinating and enuresis  Musculoskeletal: Positive for arthralgias ( in her left hip and low back area) and back pain  Negative for myalgias  Skin: Negative for rash  Allergic/Immunologic: Positive for environmental allergies  Neurological: Negative for dizziness, weakness, light-headedness, numbness and headaches  Hematological: Negative for adenopathy  Psychiatric/Behavioral: Negative for agitation  Objective:      /76 (BP Location: Right arm, Patient Position: Sitting, Cuff Size: Large)   Wt 88 kg (194 lb)   BMI 34 37 kg/m²          Physical Exam   Constitutional: She appears well-developed and well-nourished  HENT:   Head: Normocephalic  Nose: Nose normal    Mouth/Throat: Oropharynx is clear and moist    Eyes: Conjunctivae are normal    Neck: Neck supple  No thyromegaly present  Cardiovascular: Normal rate, regular rhythm and normal heart sounds  No murmur (Rate is 78 no bruits are noted) heard  Pulmonary/Chest: Effort normal and breath sounds normal    Abdominal: Soft  She exhibits no distension and no mass  There is no tenderness  Musculoskeletal: She exhibits no edema  Legs:  Lymphadenopathy:     She has no cervical adenopathy  Neurological: She is alert  She displays normal reflexes  Coordination normal    Skin: Skin is warm and dry  Psychiatric: She has a normal mood and affect  Her behavior is normal  Judgment and thought content normal    Nursing note and vitals reviewed

## 2019-12-16 NOTE — PATIENT INSTRUCTIONS
Overall doing somewhat better  Continue to push activity as tolerated and continue follow-up with Orthopedics for the left hip pain  Will check renal and lipid panel for wellness check this should be done after 12/27    Will continue all meds as is

## 2019-12-16 NOTE — ASSESSMENT & PLAN NOTE
Blood pressure seems stable will continue current regimen    Watch salt in diet and push for weight loss

## 2019-12-30 ENCOUNTER — APPOINTMENT (OUTPATIENT)
Dept: LAB | Facility: CLINIC | Age: 64
End: 2019-12-30
Payer: COMMERCIAL

## 2019-12-30 DIAGNOSIS — Z00.00 WELLNESS EXAMINATION: ICD-10-CM

## 2019-12-30 PROCEDURE — 36415 COLL VENOUS BLD VENIPUNCTURE: CPT

## 2019-12-30 PROCEDURE — 80061 LIPID PANEL: CPT

## 2019-12-30 PROCEDURE — 80069 RENAL FUNCTION PANEL: CPT

## 2019-12-31 ENCOUNTER — CONSULT (OUTPATIENT)
Dept: PAIN MEDICINE | Facility: CLINIC | Age: 64
End: 2019-12-31
Payer: COMMERCIAL

## 2019-12-31 VITALS
BODY MASS INDEX: 34.62 KG/M2 | SYSTOLIC BLOOD PRESSURE: 139 MMHG | DIASTOLIC BLOOD PRESSURE: 60 MMHG | HEIGHT: 63 IN | WEIGHT: 195.4 LBS

## 2019-12-31 DIAGNOSIS — G89.29 CHRONIC BACK PAIN, UNSPECIFIED BACK LOCATION, UNSPECIFIED BACK PAIN LATERALITY: ICD-10-CM

## 2019-12-31 DIAGNOSIS — M54.9 CHRONIC BACK PAIN, UNSPECIFIED BACK LOCATION, UNSPECIFIED BACK PAIN LATERALITY: ICD-10-CM

## 2019-12-31 DIAGNOSIS — M46.1 SACROILIITIS (HCC): Primary | ICD-10-CM

## 2019-12-31 LAB
ALBUMIN SERPL BCP-MCNC: 3.9 G/DL (ref 3.5–5)
ANION GAP SERPL CALCULATED.3IONS-SCNC: 4 MMOL/L (ref 4–13)
BUN SERPL-MCNC: 12 MG/DL (ref 5–25)
CALCIUM SERPL-MCNC: 9.4 MG/DL (ref 8.3–10.1)
CHLORIDE SERPL-SCNC: 102 MMOL/L (ref 100–108)
CHOLEST SERPL-MCNC: 250 MG/DL (ref 50–200)
CO2 SERPL-SCNC: 30 MMOL/L (ref 21–32)
CREAT SERPL-MCNC: 0.61 MG/DL (ref 0.6–1.3)
GFR SERPL CREATININE-BSD FRML MDRD: 97 ML/MIN/1.73SQ M
GLUCOSE P FAST SERPL-MCNC: 74 MG/DL (ref 65–99)
HDLC SERPL-MCNC: 45 MG/DL
LDLC SERPL CALC-MCNC: 137 MG/DL (ref 0–100)
NONHDLC SERPL-MCNC: 205 MG/DL
PHOSPHATE SERPL-MCNC: 3.5 MG/DL (ref 2.3–4.1)
POTASSIUM SERPL-SCNC: 4.2 MMOL/L (ref 3.5–5.3)
SODIUM SERPL-SCNC: 136 MMOL/L (ref 136–145)
TRIGL SERPL-MCNC: 339 MG/DL

## 2019-12-31 PROCEDURE — 99244 OFF/OP CNSLTJ NEW/EST MOD 40: CPT | Performed by: ANESTHESIOLOGY

## 2019-12-31 NOTE — PROGRESS NOTES
Assessment:  1  Sacroiliitis ([address] )    2  Chronic back pain, unspecified back location, unspecified back pain laterality        Plan:  The patient is a 55-year-old female complaints low back pain and left leg pain with a history significant for left greater trochanteric bursitis presents to office for initial consultation  Patient has been seen by Ortho and received two greater trochanteric bursa steroid injection to help alleviate some of the lateral hip pain  The patient continues to experience lumbosacral pain with tenderness to palpation of bilateral sacroiliac joint left worse than right  Patient presents clinically with sacroiliitis presentation with difficulty sitting for prolonged periods of time on her left side in often having should her way to the right side to sit  Patient also reports elicited sharp stabbing pain which radiates down from the left border to the coccyx/tail bone when standing from sitting position or when standing too long  1  We will schedule patient for a ultrasound-guided left sacroiliac joint steroid injection  2  Patient receives relief from steroid injection will then start her on physical therapy for sacroiliac joint strengthening exercises      [place] Prescription Drug Monitoring Program report was reviewed and was appropriate     Complete risks and benefits including bleeding, infection, tissue reaction, nerve injury and allergic reaction were discussed  The approach was demonstrated using models and literature was provided  Verbal and written consent was obtained  History of Present Illness: The patient is a 62 y o  female who presents for consultation in regards to Leg Pain and Knee Pain  Symptoms have been present for 6 months  Symptoms began without any precipitating injury or trauma  Pain is reported to be 6 on the numeric rating scale  Symptoms are felt intermittently and worst in the morning  Symptoms are characterized as sharp    Symptoms are associated with no weakness  Aggravating factors include standing, walking and exercise  Relieving factors include kneeling, lying down, bending and sitting  No change in symptoms with relaxation, coughing/sneezing and bowel movements  Patient is not attempting any pain treatment modalities at this time  Medications to relieve symptoms include biofreeze  Review of Systems:    Review of Systems   All other systems reviewed and are negative          Past Medical History:   Diagnosis Date    Allergic rhinitis     Functional disorder of bladder     Hypertension        Past Surgical History:   Procedure Laterality Date    HYSTERECTOMY  04/23/2019    total       Family History   Problem Relation Age of Onset    Breast cancer Maternal Aunt        Social History     Occupational History    Not on file   Tobacco Use    Smoking status: Current Every Day Smoker     Packs/day: 1 00     Years: 42 00     Pack years: 42 00    Smokeless tobacco: Never Used   Substance and Sexual Activity    Alcohol use: Yes     Comment: Occasionally     Drug use: Never    Sexual activity: Not on file         Current Outpatient Medications:     atorvastatin (LIPITOR) 10 mg tablet, Take 1 tablet (10 mg total) by mouth daily (Patient not taking: Reported on 12/13/2019), Disp: 30 tablet, Rfl: 5    baclofen 5 MG TABS, Take 5 mg by mouth 3 (three) times a day (Patient not taking: Reported on 12/13/2019), Disp: 15 tablet, Rfl: 0    CALCIUM CARBONATE-VITAMIN D PO, Take 1 tablet by mouth 2 (two) times a day, Disp: , Rfl:     citalopram (CeleXA) 20 mg tablet, Take 1 tablet (20 mg total) by mouth daily, Disp: 30 tablet, Rfl: 5    hydrOXYzine HCL (ATARAX) 50 mg tablet, Take 1 tablet by mouth daily, Disp: , Rfl:     losartan (COZAAR) 100 MG tablet, Take 1 tablet (100 mg total) by mouth daily, Disp: 90 tablet, Rfl: 3    multivitamin (THERAGRAN) TABS, Take 1 tablet by mouth daily, Disp: , Rfl:     omeprazole (PriLOSEC OTC) 20 MG tablet, Take 20 mg by mouth daily, Disp: , Rfl:     psyllium (METAMUCIL) 58 6 % packet, Take 1 packet by mouth 2 (two) times a day, Disp: , Rfl:     sodium chloride (OCEAN) 0 65 % nasal spray, 1 spray into each nostril as needed for congestion, Disp: , Rfl:     triamterene-hydrochlorothiazide (MAXZIDE) 75-50 MG per tablet, Take 1 tablet by mouth daily, Disp: 30 tablet, Rfl: 5    No Known Allergies    Physical Exam:    /60 (BP Location: Left arm, Patient Position: Sitting, Cuff Size: Large)   Ht 5' 3" (1 6 m)   Wt 88 6 kg (195 lb 6 4 oz)   BMI 34 61 kg/m²     Constitutional: normal, well developed, well nourished, alert, in no distress and non-toxic and no overt pain behavior  and obese  Eyes: anicteric  HEENT: grossly intact  Neck: supple, symmetric, trachea midline and no masses   Pulmonary:even and unlabored  Cardiovascular:No edema or pitting edema present  Skin:Normal without rashes or lesions and well hydrated  Psychiatric:Mood and affect appropriate  Neurologic:Cranial Nerves II-XII grossly intact  Musculoskeletal:antalgic, positive Gaenslen's, positive Timothy's, positive tenderness to palpation of left sacroiliac joint     Lumbar/Sacral Spine examination demonstrates  Decreased range of motion lumbar spine with pain upon: flexion, lateral rotation to the left/right, and bending to the left/right  Bilateral lumbar paraspinals tender to palpation  Muscle spasms noted in the lumbar area bilaterally  4/5 lower extremity strength in all muscle groups bilaterally  Positive seated straight leg raise for bilateral lower extremities  Sensitivity to light touch intact bilateral lower extremities  2+ reflexes in the patella and Achilles  No ankle clonus     Imaging  LUMBAR SPINE     INDICATION:   M54 9: Dorsalgia, unspecified  G89 29:  Other chronic pain      COMPARISON:  None     VIEWS:  XR SPINE LUMBAR MINIMUM 4 VIEWS NON INJURY  Images: 5     FINDINGS:  Diffuse osteopenia     There are 5 non rib bearing lumbar vertebral bodies       There is no evidence of acute fracture or destructive osseous lesion      Alignment is unremarkable       Mild degenerative facet arthrosis L4-5, L5-S1    Disc spaces are relatively well maintained throughout the lumbar region      The pedicles appear intact      Soft tissues are unremarkable      IMPRESSION:  Diffuse osteopenia     Mild multilevel degenerative facet arthrosis

## 2020-01-02 DIAGNOSIS — F33.42 RECURRENT MAJOR DEPRESSIVE DISORDER, IN FULL REMISSION (HCC): ICD-10-CM

## 2020-01-02 RX ORDER — CITALOPRAM 20 MG/1
20 TABLET ORAL DAILY
Qty: 30 TABLET | Refills: 0 | Status: SHIPPED | OUTPATIENT
Start: 2020-01-02 | End: 2020-03-06 | Stop reason: SDUPTHER

## 2020-01-09 ENCOUNTER — HOSPITAL ENCOUNTER (OUTPATIENT)
Dept: MAMMOGRAPHY | Facility: HOSPITAL | Age: 65
Discharge: HOME | End: 2020-01-09
Payer: COMMERCIAL

## 2020-01-09 VITALS — BODY MASS INDEX: 34.55 KG/M2 | WEIGHT: 195 LBS | HEIGHT: 63 IN

## 2020-01-09 DIAGNOSIS — Z12.31 SCREENING MAMMOGRAM FOR HIGH-RISK PATIENT: ICD-10-CM

## 2020-01-09 DIAGNOSIS — Z12.31 ENCOUNTER FOR SCREENING MAMMOGRAM FOR MALIGNANT NEOPLASM OF BREAST: ICD-10-CM

## 2020-01-09 PROCEDURE — 77067 SCR MAMMO BI INCL CAD: CPT

## 2020-01-09 PROCEDURE — 77063 BREAST TOMOSYNTHESIS BI: CPT

## 2020-03-06 DIAGNOSIS — F33.42 RECURRENT MAJOR DEPRESSIVE DISORDER, IN FULL REMISSION (HCC): ICD-10-CM

## 2020-03-06 RX ORDER — CITALOPRAM 20 MG/1
20 TABLET ORAL DAILY
Qty: 30 TABLET | Refills: 0 | Status: SHIPPED | OUTPATIENT
Start: 2020-03-06 | End: 2020-04-01 | Stop reason: SDUPTHER

## 2020-04-01 DIAGNOSIS — F33.42 RECURRENT MAJOR DEPRESSIVE DISORDER, IN FULL REMISSION (HCC): ICD-10-CM

## 2020-04-01 DIAGNOSIS — I10 ESSENTIAL HYPERTENSION: ICD-10-CM

## 2020-04-01 RX ORDER — TRIAMTERENE AND HYDROCHLOROTHIAZIDE 75; 50 MG/1; MG/1
1 TABLET ORAL DAILY
Qty: 30 TABLET | Refills: 5 | Status: SHIPPED | OUTPATIENT
Start: 2020-04-01 | End: 2020-09-02 | Stop reason: SDUPTHER

## 2020-04-01 RX ORDER — CITALOPRAM 20 MG/1
20 TABLET ORAL DAILY
Qty: 30 TABLET | Refills: 5 | Status: SHIPPED | OUTPATIENT
Start: 2020-04-01 | End: 2020-12-09 | Stop reason: SDUPTHER

## 2020-04-20 ENCOUNTER — TELEPHONE (OUTPATIENT)
Dept: ADMINISTRATIVE | Facility: OTHER | Age: 65
End: 2020-04-20

## 2020-04-20 ENCOUNTER — OFFICE VISIT (OUTPATIENT)
Dept: FAMILY MEDICINE CLINIC | Facility: CLINIC | Age: 65
End: 2020-04-20
Payer: COMMERCIAL

## 2020-04-20 VITALS — DIASTOLIC BLOOD PRESSURE: 80 MMHG | BODY MASS INDEX: 34.54 KG/M2 | SYSTOLIC BLOOD PRESSURE: 130 MMHG | WEIGHT: 195 LBS

## 2020-04-20 DIAGNOSIS — K59.03 DRUG-INDUCED CONSTIPATION: ICD-10-CM

## 2020-04-20 DIAGNOSIS — K21.00 GASTRO-ESOPHAGEAL REFLUX DISEASE WITH ESOPHAGITIS: Chronic | ICD-10-CM

## 2020-04-20 DIAGNOSIS — I10 ESSENTIAL HYPERTENSION: Chronic | ICD-10-CM

## 2020-04-20 DIAGNOSIS — Z00.00 WELLNESS EXAMINATION: Primary | ICD-10-CM

## 2020-04-20 DIAGNOSIS — J30.1 SEASONAL ALLERGIC RHINITIS DUE TO POLLEN: Chronic | ICD-10-CM

## 2020-04-20 DIAGNOSIS — N39.3 STRESS INCONTINENCE OF URINE: Chronic | ICD-10-CM

## 2020-04-20 DIAGNOSIS — F41.1 GENERALIZED ANXIETY DISORDER: Chronic | ICD-10-CM

## 2020-04-20 PROBLEM — R26.2 AMBULATORY DYSFUNCTION: Status: RESOLVED | Noted: 2019-11-03 | Resolved: 2020-04-20

## 2020-04-20 PROBLEM — M25.552 ACUTE HIP PAIN, LEFT: Status: RESOLVED | Noted: 2019-11-03 | Resolved: 2020-04-20

## 2020-04-20 PROCEDURE — 99396 PREV VISIT EST AGE 40-64: CPT | Performed by: FAMILY MEDICINE

## 2020-09-02 ENCOUNTER — OFFICE VISIT (OUTPATIENT)
Dept: FAMILY MEDICINE CLINIC | Facility: CLINIC | Age: 65
End: 2020-09-02
Payer: COMMERCIAL

## 2020-09-02 VITALS
HEIGHT: 63 IN | BODY MASS INDEX: 34.55 KG/M2 | WEIGHT: 195 LBS | SYSTOLIC BLOOD PRESSURE: 122 MMHG | DIASTOLIC BLOOD PRESSURE: 72 MMHG

## 2020-09-02 DIAGNOSIS — N39.3 STRESS INCONTINENCE OF URINE: Chronic | ICD-10-CM

## 2020-09-02 DIAGNOSIS — I10 ESSENTIAL HYPERTENSION: Primary | ICD-10-CM

## 2020-09-02 DIAGNOSIS — F41.1 GENERALIZED ANXIETY DISORDER: Chronic | ICD-10-CM

## 2020-09-02 DIAGNOSIS — K21.00 GASTRO-ESOPHAGEAL REFLUX DISEASE WITH ESOPHAGITIS: Chronic | ICD-10-CM

## 2020-09-02 PROCEDURE — 99213 OFFICE O/P EST LOW 20 MIN: CPT | Performed by: FAMILY MEDICINE

## 2020-09-02 PROCEDURE — 3725F SCREEN DEPRESSION PERFORMED: CPT | Performed by: FAMILY MEDICINE

## 2020-09-02 RX ORDER — TRIAMTERENE AND HYDROCHLOROTHIAZIDE 75; 50 MG/1; MG/1
1 TABLET ORAL DAILY
Qty: 30 TABLET | Refills: 5 | Status: SHIPPED | OUTPATIENT
Start: 2020-09-02 | End: 2021-04-08 | Stop reason: SDUPTHER

## 2020-09-02 NOTE — PATIENT INSTRUCTIONS
Overall seems to be doing well  Does have some trouble with dandruff and should use Nizoral shampoo    Will continue all meds as is

## 2020-09-02 NOTE — PROGRESS NOTES
Assessment/Plan:    Essential hypertension  Overall stable, continue current regimen    Stress incontinence of urine  Again urged to do her Kegel exercises on a regular basis    Gastro-esophageal reflux disease with esophagitis  Asymptomatic, continue current regimen    Generalized anxiety disorder  Stable, continue current regimen       Diagnoses and all orders for this visit:    Essential hypertension  Comments:  Doing well continue current regimen watch salt in diet and push for weight loss  Orders:  -     triamterene-hydrochlorothiazide (MAXZIDE) 75-50 MG per tablet; Take 1 tablet by mouth daily    Stress incontinence of urine    Gastro-esophageal reflux disease with esophagitis    Generalized anxiety disorder    Other orders  -     Multiple Vitamins-Minerals (PRESERVISION AREDS 2 PO); Take by mouth          Subjective:      Patient ID: Linsey Wynn is a 60 y o  female  Patient has history of hypertension, reflux esophagitis, allergic rhinitis, urinary incontinence, stress disorder and hypercholesterolemia  Overall has been feeling well      The following portions of the patient's history were reviewed and updated as appropriate: allergies, current medications, past medical history, past social history and problem list       Review of Systems   Constitutional: Negative for chills, fatigue and fever  HENT: Negative for congestion  Respiratory: Negative for cough, chest tightness and shortness of breath  Cardiovascular: Negative for chest pain, palpitations and leg swelling  Gastrointestinal: Negative for abdominal pain, constipation and diarrhea  Endocrine: Negative for polyuria (Nocturia times 0-1)  Genitourinary: Positive for enuresis  Allergic/Immunologic: Positive for environmental allergies  Neurological: Negative for dizziness and light-headedness  Psychiatric/Behavioral: Negative for sleep disturbance           Objective:      /72 (BP Location: Right arm, Patient Position: Sitting, Cuff Size: Standard)   Ht 5' 3" (1 6 m)   Wt 88 5 kg (195 lb)   BMI 34 54 kg/m²          Physical Exam  Vitals signs and nursing note reviewed  Constitutional:       Appearance: Normal appearance  She is well-developed  HENT:      Head: Normocephalic  Nose: Nose normal    Eyes:      Conjunctiva/sclera: Conjunctivae normal    Neck:      Musculoskeletal: Neck supple  Thyroid: No thyromegaly  Vascular: No carotid bruit  Cardiovascular:      Rate and Rhythm: Normal rate and regular rhythm  Heart sounds: Murmur (Rate is 72 soft early systolic murmur) present  Pulmonary:      Effort: Pulmonary effort is normal       Breath sounds: Normal breath sounds  Abdominal:      General: Abdomen is flat  There is no distension  Palpations: Abdomen is soft  There is no mass  Tenderness: There is no abdominal tenderness  Musculoskeletal:      Right lower leg: No edema  Left lower leg: No edema  Lymphadenopathy:      Cervical: No cervical adenopathy  Skin:     General: Skin is warm and dry  Findings: No rash  Neurological:      Mental Status: She is alert  Coordination: Coordination normal       Gait: Gait normal       Deep Tendon Reflexes: Reflexes normal    Psychiatric:         Mood and Affect: Mood normal          Behavior: Behavior normal          Thought Content:  Thought content normal          Judgment: Judgment normal

## 2020-09-30 ENCOUNTER — OFFICE VISIT (OUTPATIENT)
Dept: FAMILY MEDICINE CLINIC | Facility: CLINIC | Age: 65
End: 2020-09-30
Payer: COMMERCIAL

## 2020-09-30 VITALS
OXYGEN SATURATION: 95 % | BODY MASS INDEX: 33.84 KG/M2 | SYSTOLIC BLOOD PRESSURE: 124 MMHG | WEIGHT: 191 LBS | HEART RATE: 78 BPM | DIASTOLIC BLOOD PRESSURE: 74 MMHG | HEIGHT: 63 IN

## 2020-09-30 DIAGNOSIS — M70.21 OLECRANON BURSITIS OF RIGHT ELBOW: Primary | ICD-10-CM

## 2020-09-30 PROCEDURE — 99213 OFFICE O/P EST LOW 20 MIN: CPT | Performed by: FAMILY MEDICINE

## 2020-09-30 NOTE — PATIENT INSTRUCTIONS
No sign of infection  Just keep pressure off the area and it should resolve over the next 2-3 weeks  If not may return and would aspirate    Do not put heating pad on the swelling

## 2020-09-30 NOTE — PROGRESS NOTES
Assessment/Plan:      Diagnoses and all orders for this visit:    Olecranon bursitis of right elbow  Comments:  No sign of infection  Just keep pressure off the area if not gradually resolving after 3 weeks should recheck and would aspirate          Subjective:     Patient ID: Yola Cowart is a 60 y o  female  Patient seen for swelling and soreness on the right elbow that she 1st noticed on Sunday  It has decreased in size since then  Had been using heating pad  Did not recall any injury      Review of Systems   Constitutional: Negative for chills and fever  Gastrointestinal: Negative for abdominal pain  Musculoskeletal: Positive for arthralgias and joint swelling (On the right elbow)  Skin: Negative for color change and wound  Hematological: Negative for adenopathy  Objective:     Physical Exam  Vitals signs and nursing note reviewed  Constitutional:       Appearance: She is well-developed  HENT:      Head: Normocephalic  Eyes:      Conjunctiva/sclera: Conjunctivae normal    Neck:      Musculoskeletal: Neck supple  Thyroid: No thyromegaly  Cardiovascular:      Rate and Rhythm: Normal rate and regular rhythm  Heart sounds: Normal heart sounds  No murmur (Rate is 66)  Pulmonary:      Effort: Pulmonary effort is normal       Breath sounds: Normal breath sounds  Abdominal:      General: Abdomen is flat  There is no distension  Palpations: Abdomen is soft  There is no mass  Tenderness: There is no abdominal tenderness  Musculoskeletal:        Arms:       Right lower leg: No edema  Left lower leg: No edema  Lymphadenopathy:      Cervical: No cervical adenopathy  Skin:     General: Skin is warm and dry  Findings: No erythema or rash  Neurological:      Mental Status: She is alert        Gait: Gait normal    Psychiatric:         Mood and Affect: Mood normal

## 2020-10-23 ENCOUNTER — OFFICE VISIT (OUTPATIENT)
Dept: FAMILY MEDICINE CLINIC | Facility: CLINIC | Age: 65
End: 2020-10-23
Payer: COMMERCIAL

## 2020-10-23 VITALS
BODY MASS INDEX: 34.02 KG/M2 | DIASTOLIC BLOOD PRESSURE: 74 MMHG | HEIGHT: 63 IN | WEIGHT: 192 LBS | TEMPERATURE: 98 F | HEART RATE: 67 BPM | OXYGEN SATURATION: 97 % | SYSTOLIC BLOOD PRESSURE: 122 MMHG

## 2020-10-23 DIAGNOSIS — M70.21 OLECRANON BURSITIS OF RIGHT ELBOW: Primary | ICD-10-CM

## 2020-10-23 PROCEDURE — 3008F BODY MASS INDEX DOCD: CPT | Performed by: FAMILY MEDICINE

## 2020-10-23 PROCEDURE — 99213 OFFICE O/P EST LOW 20 MIN: CPT | Performed by: FAMILY MEDICINE

## 2020-10-23 PROCEDURE — 3078F DIAST BP <80 MM HG: CPT | Performed by: FAMILY MEDICINE

## 2020-10-23 PROCEDURE — 4004F PT TOBACCO SCREEN RCVD TLK: CPT | Performed by: FAMILY MEDICINE

## 2020-10-23 PROCEDURE — 3074F SYST BP LT 130 MM HG: CPT | Performed by: FAMILY MEDICINE

## 2020-10-23 RX ORDER — AMOXICILLIN AND CLAVULANATE POTASSIUM 875; 125 MG/1; MG/1
1 TABLET, FILM COATED ORAL EVERY 12 HOURS SCHEDULED
Qty: 20 TABLET | Refills: 0 | Status: SHIPPED | OUTPATIENT
Start: 2020-10-23 | End: 2020-11-03 | Stop reason: ALTCHOICE

## 2020-11-03 ENCOUNTER — OFFICE VISIT (OUTPATIENT)
Dept: FAMILY MEDICINE CLINIC | Facility: CLINIC | Age: 65
End: 2020-11-03
Payer: COMMERCIAL

## 2020-11-03 VITALS
WEIGHT: 194 LBS | SYSTOLIC BLOOD PRESSURE: 122 MMHG | DIASTOLIC BLOOD PRESSURE: 72 MMHG | HEIGHT: 63 IN | BODY MASS INDEX: 34.38 KG/M2 | OXYGEN SATURATION: 95 % | HEART RATE: 70 BPM

## 2020-11-03 DIAGNOSIS — Z12.31 ENCOUNTER FOR SCREENING MAMMOGRAM FOR MALIGNANT NEOPLASM OF BREAST: ICD-10-CM

## 2020-11-03 DIAGNOSIS — M70.21 OLECRANON BURSITIS OF RIGHT ELBOW: Primary | ICD-10-CM

## 2020-11-03 PROCEDURE — 99212 OFFICE O/P EST SF 10 MIN: CPT | Performed by: FAMILY MEDICINE

## 2020-11-03 PROCEDURE — 3008F BODY MASS INDEX DOCD: CPT | Performed by: FAMILY MEDICINE

## 2020-11-03 PROCEDURE — 20605 DRAIN/INJ JOINT/BURSA W/O US: CPT | Performed by: FAMILY MEDICINE

## 2020-11-03 PROCEDURE — 3074F SYST BP LT 130 MM HG: CPT | Performed by: FAMILY MEDICINE

## 2020-11-03 PROCEDURE — 3078F DIAST BP <80 MM HG: CPT | Performed by: FAMILY MEDICINE

## 2020-11-03 PROCEDURE — 4004F PT TOBACCO SCREEN RCVD TLK: CPT | Performed by: FAMILY MEDICINE

## 2020-11-03 RX ORDER — METHYLPREDNISOLONE ACETATE 40 MG/ML
1 INJECTION, SUSPENSION INTRA-ARTICULAR; INTRALESIONAL; INTRAMUSCULAR; SOFT TISSUE
Status: COMPLETED | OUTPATIENT
Start: 2020-11-03 | End: 2020-11-03

## 2020-11-03 RX ADMIN — METHYLPREDNISOLONE ACETATE 1 ML: 40 INJECTION, SUSPENSION INTRA-ARTICULAR; INTRALESIONAL; INTRAMUSCULAR; SOFT TISSUE at 11:18

## 2020-11-30 DIAGNOSIS — I10 ESSENTIAL HYPERTENSION: Chronic | ICD-10-CM

## 2020-11-30 RX ORDER — LOSARTAN POTASSIUM 100 MG/1
100 TABLET ORAL DAILY
Qty: 90 TABLET | Refills: 3 | Status: SHIPPED | OUTPATIENT
Start: 2020-11-30 | End: 2021-10-30

## 2020-12-09 DIAGNOSIS — F33.42 RECURRENT MAJOR DEPRESSIVE DISORDER, IN FULL REMISSION (HCC): ICD-10-CM

## 2020-12-09 RX ORDER — CITALOPRAM 20 MG/1
20 TABLET ORAL DAILY
Qty: 30 TABLET | Refills: 5 | Status: SHIPPED | OUTPATIENT
Start: 2020-12-09 | End: 2021-07-22 | Stop reason: SDUPTHER

## 2020-12-15 ENCOUNTER — TELEPHONE (OUTPATIENT)
Dept: FAMILY MEDICINE CLINIC | Facility: CLINIC | Age: 65
End: 2020-12-15

## 2020-12-15 DIAGNOSIS — I10 ESSENTIAL HYPERTENSION: Primary | Chronic | ICD-10-CM

## 2020-12-15 DIAGNOSIS — E78.00 HYPERCHOLESTEROLEMIA: Chronic | ICD-10-CM

## 2020-12-30 ENCOUNTER — LAB (OUTPATIENT)
Dept: LAB | Facility: MEDICAL CENTER | Age: 65
End: 2020-12-30
Payer: COMMERCIAL

## 2020-12-30 DIAGNOSIS — I10 ESSENTIAL HYPERTENSION: Chronic | ICD-10-CM

## 2020-12-30 DIAGNOSIS — E78.00 HYPERCHOLESTEROLEMIA: Chronic | ICD-10-CM

## 2020-12-30 LAB
ALBUMIN SERPL BCP-MCNC: 4.1 G/DL (ref 3.5–5)
ANION GAP SERPL CALCULATED.3IONS-SCNC: 6 MMOL/L (ref 4–13)
BUN SERPL-MCNC: 15 MG/DL (ref 5–25)
CALCIUM SERPL-MCNC: 9.6 MG/DL (ref 8.3–10.1)
CHLORIDE SERPL-SCNC: 105 MMOL/L (ref 100–108)
CHOLEST SERPL-MCNC: 288 MG/DL (ref 50–200)
CO2 SERPL-SCNC: 30 MMOL/L (ref 21–32)
CREAT SERPL-MCNC: 0.64 MG/DL (ref 0.6–1.3)
GFR SERPL CREATININE-BSD FRML MDRD: 95 ML/MIN/1.73SQ M
GLUCOSE P FAST SERPL-MCNC: 91 MG/DL (ref 65–99)
HDLC SERPL-MCNC: 45 MG/DL
NONHDLC SERPL-MCNC: 243 MG/DL
PHOSPHATE SERPL-MCNC: 3.8 MG/DL (ref 2.3–4.1)
POTASSIUM SERPL-SCNC: 4.1 MMOL/L (ref 3.5–5.3)
SODIUM SERPL-SCNC: 141 MMOL/L (ref 136–145)
TRIGL SERPL-MCNC: 409 MG/DL

## 2020-12-30 PROCEDURE — 36415 COLL VENOUS BLD VENIPUNCTURE: CPT

## 2020-12-30 PROCEDURE — 80069 RENAL FUNCTION PANEL: CPT

## 2020-12-30 PROCEDURE — 80061 LIPID PANEL: CPT

## 2020-12-31 NOTE — RESULT ENCOUNTER NOTE
Please call the patient regarding her abnormal result  The cholesterol levels are higher  Will discuss at next visit I am probably going to recommend use of medication    Other labs are okay

## 2021-01-04 ENCOUNTER — OFFICE VISIT (OUTPATIENT)
Dept: FAMILY MEDICINE CLINIC | Facility: CLINIC | Age: 66
End: 2021-01-04
Payer: COMMERCIAL

## 2021-01-04 VITALS
WEIGHT: 195 LBS | HEART RATE: 83 BPM | DIASTOLIC BLOOD PRESSURE: 74 MMHG | SYSTOLIC BLOOD PRESSURE: 126 MMHG | OXYGEN SATURATION: 97 % | HEIGHT: 63 IN | BODY MASS INDEX: 34.55 KG/M2

## 2021-01-04 DIAGNOSIS — Z12.31 ENCOUNTER FOR SCREENING MAMMOGRAM FOR BREAST CANCER: ICD-10-CM

## 2021-01-04 DIAGNOSIS — I10 ESSENTIAL HYPERTENSION: Primary | Chronic | ICD-10-CM

## 2021-01-04 DIAGNOSIS — F41.1 GENERALIZED ANXIETY DISORDER: Chronic | ICD-10-CM

## 2021-01-04 DIAGNOSIS — K21.00 GASTROESOPHAGEAL REFLUX DISEASE WITH ESOPHAGITIS WITHOUT HEMORRHAGE: Chronic | ICD-10-CM

## 2021-01-04 PROBLEM — M70.21 OLECRANON BURSITIS OF RIGHT ELBOW: Status: RESOLVED | Noted: 2020-11-03 | Resolved: 2021-01-04

## 2021-01-04 PROCEDURE — 99213 OFFICE O/P EST LOW 20 MIN: CPT | Performed by: FAMILY MEDICINE

## 2021-01-04 PROCEDURE — 3074F SYST BP LT 130 MM HG: CPT | Performed by: FAMILY MEDICINE

## 2021-01-04 PROCEDURE — 3008F BODY MASS INDEX DOCD: CPT | Performed by: FAMILY MEDICINE

## 2021-01-04 PROCEDURE — 3078F DIAST BP <80 MM HG: CPT | Performed by: FAMILY MEDICINE

## 2021-01-04 PROCEDURE — 4004F PT TOBACCO SCREEN RCVD TLK: CPT | Performed by: FAMILY MEDICINE

## 2021-01-04 NOTE — PATIENT INSTRUCTIONS
Overall doing well  Should increase the Metamucil to 1-1/2 tsp 3 times a day and continue the fish oil  Try to watch the sweets in the diet as well as the starch to help with the triglycerides    Continue all meds as is

## 2021-01-04 NOTE — PROGRESS NOTES
Assessment/Plan:    Essential hypertension  Overall stable  Continue current regimen    Gastro-esophageal reflux disease with esophagitis  Asymptomatic, continue current regimen    Generalized anxiety disorder  Doing well  Continue current regimen       Diagnoses and all orders for this visit:    Essential hypertension    Encounter for screening mammogram for breast cancer  -     Mammo screening bilateral w 3d & cad; Future    Gastroesophageal reflux disease with esophagitis without hemorrhage    Generalized anxiety disorder          Subjective:      Patient ID: Alexandria Zeng is a 60 y o  female  Patient has history of hypertension, reflux esophagitis, allergic rhinitis, urinary incontinence, stress disorder and hypercholesterolemia  Overall has been feeling well      The following portions of the patient's history were reviewed and updated as appropriate: allergies, current medications, past medical history, past social history and problem list BMI Counseling: Body mass index is 34 54 kg/m²  The BMI is above normal  Nutrition recommendations include moderation in carbohydrate intake  Exercise recommendations include moderate physical activity 150 minutes/week  No pharmacotherapy was ordered       Review of Systems   Constitutional: Negative for fatigue and fever  HENT: Negative for congestion  Eyes: Negative for visual disturbance  Respiratory: Negative for cough, chest tightness and shortness of breath  Cardiovascular: Negative for chest pain, palpitations and leg swelling  Gastrointestinal: Negative for abdominal pain  Endocrine: Positive for polyuria  Genitourinary: Positive for enuresis  Musculoskeletal: Negative for arthralgias  Allergic/Immunologic: Positive for environmental allergies  Neurological: Negative for dizziness, light-headedness and headaches  Psychiatric/Behavioral: Negative for sleep disturbance           Objective:      /74 (BP Location: Right arm, Patient Position: Sitting, Cuff Size: Standard)   Pulse 83   Ht 5' 3" (1 6 m)   Wt 88 5 kg (195 lb)   SpO2 97%   BMI 34 54 kg/m²          Physical Exam  Vitals signs and nursing note reviewed  Constitutional:       Appearance: She is well-developed  She is obese  HENT:      Head: Normocephalic  Nose: Nose normal    Eyes:      Conjunctiva/sclera: Conjunctivae normal    Neck:      Musculoskeletal: Neck supple  Thyroid: No thyromegaly  Vascular: No carotid bruit  Cardiovascular:      Rate and Rhythm: Normal rate and regular rhythm  Heart sounds: Murmur (Soft systolic murmur at left sternal border  Heart rate is 66) present  Pulmonary:      Effort: Pulmonary effort is normal       Breath sounds: Normal breath sounds  Abdominal:      General: There is no distension  Palpations: Abdomen is soft  There is no mass  Tenderness: There is no abdominal tenderness  Musculoskeletal:      Right lower leg: No edema  Left lower leg: No edema  Lymphadenopathy:      Cervical: No cervical adenopathy  Skin:     General: Skin is warm and dry  Findings: No rash  Neurological:      Mental Status: She is alert  Coordination: Coordination normal       Deep Tendon Reflexes: Reflexes abnormal ( reflexes 1+)  Psychiatric:         Mood and Affect: Mood normal          Behavior: Behavior normal          Thought Content:  Thought content normal          Judgment: Judgment normal

## 2021-03-15 ENCOUNTER — TELEPHONE (OUTPATIENT)
Dept: MAMMOGRAPHY | Facility: HOSPITAL | Age: 66
End: 2021-03-15

## 2021-03-30 DIAGNOSIS — Z23 ENCOUNTER FOR IMMUNIZATION: ICD-10-CM

## 2021-04-08 DIAGNOSIS — I10 ESSENTIAL HYPERTENSION: ICD-10-CM

## 2021-04-08 RX ORDER — TRIAMTERENE AND HYDROCHLOROTHIAZIDE 75; 50 MG/1; MG/1
1 TABLET ORAL DAILY
Qty: 30 TABLET | Refills: 5 | Status: SHIPPED | OUTPATIENT
Start: 2021-04-08 | End: 2021-09-14 | Stop reason: SDUPTHER

## 2021-04-10 ENCOUNTER — HOSPITAL ENCOUNTER (EMERGENCY)
Facility: HOSPITAL | Age: 66
Discharge: HOME | End: 2021-04-10
Attending: EMERGENCY MEDICINE | Admitting: EMERGENCY MEDICINE
Payer: COMMERCIAL

## 2021-04-10 ENCOUNTER — APPOINTMENT (EMERGENCY)
Dept: CT IMAGING | Facility: HOSPITAL | Age: 66
End: 2021-04-10
Payer: COMMERCIAL

## 2021-04-10 ENCOUNTER — OFFICE VISIT (OUTPATIENT)
Dept: URGENT CARE | Facility: CLINIC | Age: 66
End: 2021-04-10
Payer: COMMERCIAL

## 2021-04-10 VITALS
RESPIRATION RATE: 22 BRPM | OXYGEN SATURATION: 95 % | SYSTOLIC BLOOD PRESSURE: 133 MMHG | DIASTOLIC BLOOD PRESSURE: 62 MMHG | TEMPERATURE: 98.3 F | HEART RATE: 66 BPM

## 2021-04-10 VITALS
BODY MASS INDEX: 34.55 KG/M2 | RESPIRATION RATE: 20 BRPM | WEIGHT: 195 LBS | DIASTOLIC BLOOD PRESSURE: 79 MMHG | OXYGEN SATURATION: 98 % | HEIGHT: 63 IN | SYSTOLIC BLOOD PRESSURE: 170 MMHG | HEART RATE: 72 BPM | TEMPERATURE: 96 F

## 2021-04-10 DIAGNOSIS — R03.0 ELEVATED BLOOD PRESSURE READING: ICD-10-CM

## 2021-04-10 DIAGNOSIS — I10 HTN (HYPERTENSION): ICD-10-CM

## 2021-04-10 DIAGNOSIS — I10 ESSENTIAL HYPERTENSION: ICD-10-CM

## 2021-04-10 DIAGNOSIS — R42 DIZZINESS: Primary | ICD-10-CM

## 2021-04-10 LAB
ALBUMIN SERPL BCP-MCNC: 4.5 G/DL (ref 3.5–5.7)
ALP SERPL-CCNC: 60 U/L (ref 55–165)
ALT SERPL W P-5'-P-CCNC: 24 U/L (ref 7–52)
ANION GAP SERPL CALCULATED.3IONS-SCNC: 11 MMOL/L (ref 4–13)
AST SERPL W P-5'-P-CCNC: 21 U/L (ref 13–39)
BASOPHILS # BLD AUTO: 0.1 THOUSANDS/ΜL (ref 0–0.1)
BASOPHILS NFR BLD AUTO: 1 % (ref 0–2)
BILIRUB SERPL-MCNC: 0.3 MG/DL (ref 0.2–1)
BUN SERPL-MCNC: 13 MG/DL (ref 7–25)
CALCIUM SERPL-MCNC: 9.2 MG/DL (ref 8.6–10.5)
CHLORIDE SERPL-SCNC: 98 MMOL/L (ref 98–107)
CO2 SERPL-SCNC: 30 MMOL/L (ref 21–31)
CREAT SERPL-MCNC: 0.67 MG/DL (ref 0.6–1.2)
EOSINOPHIL # BLD AUTO: 0.4 THOUSAND/ΜL (ref 0–0.61)
EOSINOPHIL NFR BLD AUTO: 4 % (ref 0–5)
ERYTHROCYTE [DISTWIDTH] IN BLOOD BY AUTOMATED COUNT: 13.3 % (ref 11.5–14.5)
GFR SERPL CREATININE-BSD FRML MDRD: 93 ML/MIN/1.73SQ M
GLUCOSE SERPL-MCNC: 105 MG/DL (ref 65–99)
HCT VFR BLD AUTO: 38.2 % (ref 42–47)
HGB BLD-MCNC: 13.3 G/DL (ref 12–16)
LYMPHOCYTES # BLD AUTO: 4.4 THOUSANDS/ΜL (ref 0.6–4.47)
LYMPHOCYTES NFR BLD AUTO: 38 % (ref 21–51)
MCH RBC QN AUTO: 30.3 PG (ref 26–34)
MCHC RBC AUTO-ENTMCNC: 34.7 G/DL (ref 31–37)
MCV RBC AUTO: 87 FL (ref 81–99)
MONOCYTES # BLD AUTO: 0.5 THOUSAND/ΜL (ref 0.17–1.22)
MONOCYTES NFR BLD AUTO: 4 % (ref 2–12)
NEUTROPHILS # BLD AUTO: 6.4 THOUSANDS/ΜL (ref 1.4–6.5)
NEUTS SEG NFR BLD AUTO: 54 % (ref 42–75)
PLATELET # BLD AUTO: 324 THOUSANDS/UL (ref 149–390)
PMV BLD AUTO: 8.9 FL (ref 8.6–11.7)
POTASSIUM SERPL-SCNC: 4 MMOL/L (ref 3.5–5.5)
PROT SERPL-MCNC: 7.2 G/DL (ref 6.4–8.9)
RBC # BLD AUTO: 4.38 MILLION/UL (ref 3.9–5.2)
SODIUM SERPL-SCNC: 139 MMOL/L (ref 134–143)
TROPONIN I SERPL-MCNC: <0.03 NG/ML
WBC # BLD AUTO: 11.8 THOUSAND/UL (ref 4.8–10.8)

## 2021-04-10 PROCEDURE — 80053 COMPREHEN METABOLIC PANEL: CPT | Performed by: PHYSICIAN ASSISTANT

## 2021-04-10 PROCEDURE — 36415 COLL VENOUS BLD VENIPUNCTURE: CPT | Performed by: PHYSICIAN ASSISTANT

## 2021-04-10 PROCEDURE — 99284 EMERGENCY DEPT VISIT MOD MDM: CPT | Performed by: PHYSICIAN ASSISTANT

## 2021-04-10 PROCEDURE — G1004 CDSM NDSC: HCPCS

## 2021-04-10 PROCEDURE — 93005 ELECTROCARDIOGRAM TRACING: CPT

## 2021-04-10 PROCEDURE — 85025 COMPLETE CBC W/AUTO DIFF WBC: CPT | Performed by: PHYSICIAN ASSISTANT

## 2021-04-10 PROCEDURE — 70498 CT ANGIOGRAPHY NECK: CPT

## 2021-04-10 PROCEDURE — 70496 CT ANGIOGRAPHY HEAD: CPT

## 2021-04-10 PROCEDURE — 99214 OFFICE O/P EST MOD 30 MIN: CPT | Performed by: NURSE PRACTITIONER

## 2021-04-10 PROCEDURE — 84484 ASSAY OF TROPONIN QUANT: CPT | Performed by: PHYSICIAN ASSISTANT

## 2021-04-10 PROCEDURE — 99284 EMERGENCY DEPT VISIT MOD MDM: CPT

## 2021-04-10 RX ADMIN — IOHEXOL 85 ML: 350 INJECTION, SOLUTION INTRAVENOUS at 17:43

## 2021-04-10 NOTE — PROGRESS NOTES
[facility]        NAME: Samantha Robertson is a 60 y o  female  : 1955    MRN: [id number]  DATE: April 10, 2021  TIME: 3:05 PM    Assessment and Plan   Dizziness [R42]  1  Dizziness  Transfer to other facility   2  Elevated blood pressure reading  Transfer to other facility   3  Essential hypertension  Transfer to other facility         Patient Instructions       Follow up with PCP in 3-5 days  Proceed to  ER if symptoms worsen  You are to have your  take to to [facility] ED now  Follow up with your PCP       Chief Complaint     Chief Complaint   Patient presents with    Dizziness     becames dizzy, lightheaded and disoriented during the night         History of Present Illness       This is a 60year old female who states last night started with dizziness and lightheadedness and noted during the night she seemed disoriented  She states this am she got up feeling this way, took a nap and still felt like this when she woke up  She states she took her BP and it was elevated more than normal  She states BP is generally 120/s  She states she "fell onto the toilet"  She states she takes her medications at all times and did take them today   is with pt  She denies an CP, SOB, blurred vision, headache or signs of CVA or dysuria  Dizziness        Review of Systems   Review of Systems   Constitutional: Negative  HENT: Negative  Eyes: Negative  Respiratory: Negative  Cardiovascular: Negative  Endocrine: Negative  Genitourinary: Negative  Musculoskeletal: Negative  Skin: Negative  Allergic/Immunologic: Negative  Neurological: Positive for dizziness and light-headedness  Hematological: Negative  Psychiatric/Behavioral: Negative           Disorientation          Current Medications       Current Outpatient Medications:     CALCIUM CARBONATE-VITAMIN D PO, Take 1 tablet by mouth 2 (two) times a day, Disp: , Rfl:     citalopram (CeleXA) 20 mg tablet, Take 1 tablet (20 mg total) by mouth daily, Disp: 30 tablet, Rfl: 5    losartan (COZAAR) 100 MG tablet, Take 1 tablet (100 mg total) by mouth daily, Disp: 90 tablet, Rfl: 3    Multiple Vitamins-Minerals (PRESERVISION AREDS 2 PO), Take by mouth, Disp: , Rfl:     multivitamin (THERAGRAN) TABS, Take 1 tablet by mouth daily, Disp: , Rfl:     omeprazole (PriLOSEC OTC) 20 MG tablet, Take 20 mg by mouth daily, Disp: , Rfl:     psyllium (METAMUCIL) 58 6 % packet, Take 1 packet by mouth 2 (two) times a day, Disp: , Rfl:     triamterene-hydrochlorothiazide (MAXZIDE) 75-50 MG per tablet, Take 1 tablet by mouth daily, Disp: 30 tablet, Rfl: 5    sodium chloride (OCEAN) 0 65 % nasal spray, 1 spray into each nostril as needed for congestion, Disp: , Rfl:     Current Allergies     Allergies as of 04/10/2021    (No Known Allergies)            The following portions of the patient's history were reviewed and updated as appropriate: allergies, current medications, past family history, past medical history, past social history, past surgical history and problem list      Past Medical History:   Diagnosis Date    Allergic rhinitis     Functional disorder of bladder     Hypertension        Past Surgical History:   Procedure Laterality Date    HYSTERECTOMY  04/23/2019    total       Family History   Problem Relation Age of Onset    Breast cancer Maternal Aunt         approx 79 yrs of age    Pablo Pert No Known Problems Mother     No Known Problems Father     No Known Problems Daughter     No Known Problems Maternal Grandmother     No Known Problems Maternal Grandfather     Emphysema Paternal Grandmother     No Known Problems Paternal Grandfather     No Known Problems Paternal Aunt     No Known Problems Paternal Aunt          Medications have been verified          Objective   /79   Pulse 72   Temp (!) 96 °F (35 6 °C) (Temporal)   Resp 20   Ht 5' 3" (1 6 m)   Wt 88 5 kg (195 lb)   SpO2 98%   BMI 34 54 kg/m²   No LMP recorded  Patient has had a hysterectomy  Physical Exam     Physical Exam  Vitals signs and nursing note reviewed  Constitutional:       General: She is not in acute distress  Appearance: Normal appearance  She is obese  She is not ill-appearing, toxic-appearing or diaphoretic  HENT:      Head: Normocephalic and atraumatic  Right Ear: Tympanic membrane and ear canal normal       Left Ear: Tympanic membrane and ear canal normal       Nose: Nose normal       Mouth/Throat:      Mouth: Mucous membranes are moist       Pharynx: No posterior oropharyngeal erythema  Eyes:      General: No visual field deficit  Extraocular Movements: Extraocular movements intact  Pupils: Pupils are equal, round, and reactive to light  Neck:      Musculoskeletal: Normal range of motion  Cardiovascular:      Rate and Rhythm: Normal rate and regular rhythm  Pulses: Normal pulses  Heart sounds: Normal heart sounds  Pulmonary:      Effort: Pulmonary effort is normal       Breath sounds: Normal breath sounds  Abdominal:      General: There is no distension  Palpations: Abdomen is soft  Tenderness: There is no abdominal tenderness  Musculoskeletal: Normal range of motion  Skin:     General: Skin is warm and dry  Capillary Refill: Capillary refill takes less than 2 seconds  Neurological:      General: No focal deficit present  Mental Status: She is alert and oriented to person, place, and time  GCS: GCS eye subscore is 4  GCS verbal subscore is 5  GCS motor subscore is 6  Cranial Nerves: Cranial nerves are intact  No cranial nerve deficit, dysarthria or facial asymmetry  Sensory: Sensation is intact  No sensory deficit  Motor: Motor function is intact  No weakness, tremor, atrophy, abnormal muscle tone, seizure activity or pronator drift  Coordination: Coordination is intact  Romberg sign negative   Coordination normal  Finger-Nose-Finger Test normal  Rapid alternating movements normal       Gait: Gait is intact  Gait and tandem walk normal       Deep Tendon Reflexes: Reflexes normal    Psychiatric:         Mood and Affect: Mood normal          Behavior: Behavior normal          Thought Content: Thought content normal          Judgment: Judgment normal                Pt sent to ED for neuro evaluation  Exam in [facility] normal   Pt verbalizes agreement  Explained may need labs and CT

## 2021-04-10 NOTE — ED PROVIDER NOTES
History  Chief Complaint   Patient presents with    Dizziness     61-year-old female presents emergency room complaining of episodes of dizziness that occur when standing and with ambulation  When at rest her dizziness as room is resolved and she feels fine  She also expresses concern for elevated blood pressure  History of hypertension  No history of CVA  She reports having distant episodes dizziness as however not this severe previously  No obvious focal deficits  Patient is well-appearing and denies any other complaints  Allergies reviewed          Prior to Admission Medications   Prescriptions Last Dose Informant Patient Reported? Taking?    CALCIUM CARBONATE-VITAMIN D PO   Yes No   Sig: Take 1 tablet by mouth 2 (two) times a day   Multiple Vitamins-Minerals (PRESERVISION AREDS 2 PO)   Yes No   Sig: Take by mouth   citalopram (CeleXA) 20 mg tablet   No No   Sig: Take 1 tablet (20 mg total) by mouth daily   losartan (COZAAR) 100 MG tablet   No No   Sig: Take 1 tablet (100 mg total) by mouth daily   multivitamin (THERAGRAN) TABS   Yes No   Sig: Take 1 tablet by mouth daily   omeprazole (PriLOSEC OTC) 20 MG tablet   Yes No   Sig: Take 20 mg by mouth daily   psyllium (METAMUCIL) 58 6 % packet   Yes No   Sig: Take 1 packet by mouth 2 (two) times a day   sodium chloride (OCEAN) 0 65 % nasal spray   Yes No   Si spray into each nostril as needed for congestion   triamterene-hydrochlorothiazide (MAXZIDE) 75-50 MG per tablet   No No   Sig: Take 1 tablet by mouth daily      Facility-Administered Medications: None       Past Medical History:   Diagnosis Date    Allergic rhinitis     Functional disorder of bladder     Hypertension        Past Surgical History:   Procedure Laterality Date    HYSTERECTOMY  2019    total       Family History   Problem Relation Age of Onset    Breast cancer Maternal Aunt         approx 79 yrs of age    Caryl Kaplan No Known Problems Mother     No Known Problems Father     No Known Problems Daughter     No Known Problems Maternal Grandmother     No Known Problems Maternal Grandfather     Emphysema Paternal Grandmother     No Known Problems Paternal Grandfather     No Known Problems Paternal Aunt     No Known Problems Paternal Aunt      I have reviewed and agree with the history as documented  E-Cigarette/Vaping    E-Cigarette Use Never User      E-Cigarette/Vaping Substances     Social History     Tobacco Use    Smoking status: Current Every Day Smoker     Packs/day: 1 00     Years: 42 00     Pack years: 42 00     Types: Cigarettes    Smokeless tobacco: Never Used   Substance Use Topics    Alcohol use: Yes     Frequency: Monthly or less     Drinks per session: 1 or 2     Binge frequency: Never     Comment: Occasionally     Drug use: Never       Review of Systems   Constitutional: Negative for chills, fatigue and fever  HENT: Negative for congestion, ear pain, rhinorrhea, sinus pressure, sneezing, sore throat and trouble swallowing  Eyes: Negative for discharge and itching  Respiratory: Negative for cough, chest tightness, shortness of breath, wheezing and stridor  Cardiovascular: Negative for chest pain and palpitations  Gastrointestinal: Negative for abdominal pain, diarrhea, nausea and vomiting  Neurological: Positive for dizziness  Negative for syncope, numbness and headaches  All other systems reviewed and are negative  Physical Exam  Physical Exam  Vitals signs and nursing note reviewed  Constitutional:       General: She is not in acute distress  Appearance: She is well-developed  She is not diaphoretic  HENT:      Head: Normocephalic and atraumatic  Right Ear: External ear normal       Left Ear: External ear normal       Nose: Nose normal    Eyes:      Conjunctiva/sclera: Conjunctivae normal       Pupils: Pupils are equal, round, and reactive to light  Neck:      Musculoskeletal: Normal range of motion     Cardiovascular:      Rate and Rhythm: Normal rate and regular rhythm  Heart sounds: Normal heart sounds  No murmur  No friction rub  No gallop  Pulmonary:      Effort: Pulmonary effort is normal  No respiratory distress  Breath sounds: Normal breath sounds  No stridor  No wheezing or rales  Abdominal:      General: Bowel sounds are normal  There is no distension  Palpations: Abdomen is soft  Tenderness: There is no abdominal tenderness  There is no guarding  Musculoskeletal: Normal range of motion  General: No tenderness  Skin:     General: Skin is warm  Capillary Refill: Capillary refill takes less than 2 seconds  Neurological:      Mental Status: She is alert and oriented to person, place, and time           Vital Signs  ED Triage Vitals [04/10/21 1526]   Temperature Pulse Respirations Blood Pressure SpO2   98 3 °F (36 8 °C) 65 18 168/79 99 %      Temp Source Heart Rate Source Patient Position - Orthostatic VS BP Location FiO2 (%)   Temporal Monitor Lying Left arm --      Pain Score       --           Vitals:    04/10/21 1526 04/10/21 1530 04/10/21 1830 04/10/21 1930   BP: 168/79 146/58 134/63 133/62   Pulse: 65 68 64 66   Patient Position - Orthostatic VS: Lying  Sitting Sitting         Visual Acuity      ED Medications  Medications   iohexol (OMNIPAQUE) 350 MG/ML injection (MULTI-DOSE) 85 mL (85 mL Intravenous Given 4/10/21 1743)       Diagnostic Studies  Results Reviewed     Procedure Component Value Units Date/Time    Troponin I [[id number]]  (Normal) Collected: 04/10/21 1910    Lab Status: Final result Specimen: Blood from Arm, Left Updated: 04/10/21 1937     Troponin I <0 03 ng/mL     Comprehensive metabolic panel [[id number]]  (Abnormal) Collected: 04/10/21 1652    Lab Status: Final result Specimen: Blood Updated: 04/10/21 1709     Sodium 139 mmol/L      Potassium 4 0 mmol/L      Chloride 98 mmol/L      CO2 30 mmol/L      ANION GAP 11 mmol/L      BUN 13 mg/dL      Creatinine 0 67 mg/dL Glucose 105 mg/dL      Calcium 9 2 mg/dL      AST 21 U/L      ALT 24 U/L      Alkaline Phosphatase 60 U/L      Total Protein 7 2 g/dL      Albumin 4 5 g/dL      Total Bilirubin 0 30 mg/dL      eGFR 93 ml/min/1 73sq m     Narrative:      [place] guidelines for Chronic Kidney Disease (CKD):     Stage 1 with normal or high GFR (GFR > 90 mL/min/1 73 square meters)    Stage 2 Mild CKD (GFR = 60-89 mL/min/1 73 square meters)    Stage 3A Moderate CKD (GFR = 45-59 mL/min/1 73 square meters)    Stage 3B Moderate CKD (GFR = 30-44 mL/min/1 73 square meters)    Stage 4 Severe CKD (GFR = 15-29 mL/min/1 73 square meters)    Stage 5 End Stage CKD (GFR <15 mL/min/1 73 square meters)  Note: GFR calculation is accurate only with a steady state creatinine    CBC and differential [[id number]]  (Abnormal) Collected: 04/10/21 1652    Lab Status: Final result Specimen: Blood Updated: 04/10/21 1658     WBC 11 80 Thousand/uL      RBC 4 38 Million/uL      Hemoglobin 13 3 g/dL      Hematocrit 38 2 %      MCV 87 fL      MCH 30 3 pg      MCHC 34 7 g/dL      RDW 13 3 %      MPV 8 9 fL      Platelets 448 Thousands/uL      Neutrophils Relative 54 %      Lymphocytes Relative 38 %      Monocytes Relative 4 %      Eosinophils Relative 4 %      Basophils Relative 1 %      Neutrophils Absolute 6 40 Thousands/µL      Lymphocytes Absolute 4 40 Thousands/µL      Monocytes Absolute 0 50 Thousand/µL      Eosinophils Absolute 0 40 Thousand/µL      Basophils Absolute 0 10 Thousands/µL                  CTA head and neck with and without contrast   Final Result by Darryl Morales DO (04/10 1802)      No acute intracranial abnormality  Normal CTA of the neck and brain  Workstation performed: [id number]                    Procedures  Procedures         ED Course  ED Course as of Apr 13 1657   Sat Apr 10, 2021   1902 Orthostatic hypertension    Patient's blood pressure is 595G systolic at rest upon standing it jumped to 442 systolic and the patient began experiencing symptoms of lightheadedness and dizziness  CT a unremarkable  SBIRT 22yo+      Most Recent Value   SBIRT (22 yo +)   In order to provide better care to our patients, we are screening all of our patients for alcohol and drug use  Would it be okay to ask you these screening questions? Yes Filed at: 04/10/2021 2006   Initial Alcohol Screen: US AUDIT-C    1  How often do you have a drink containing alcohol?  0 Filed at: 04/10/2021 2006   2  How many drinks containing alcohol do you have on a typical day you are drinking? 0 Filed at: 04/10/2021 2006   3a  Male UNDER 65: How often do you have five or more drinks on one occasion? 0 Filed at: 04/10/2021 2006   3b  FEMALE Any Age, or MALE 65+: How often do you have 4 or more drinks on one occassion? 0 Filed at: 04/10/2021 2006   Audit-C Score  0 Filed at: 04/10/2021 2006   MICKY: How many times in the past year have you    Used an illegal drug or used a prescription medication for non-medical reasons? Never Filed at: 04/10/2021 2006                    MDM  Number of Diagnoses or Management Options  Dizziness: new and requires workup  HTN (hypertension): new and requires workup  Diagnosis management comments: Dizziness when standing  Asymptomatic when at rest   Patient's blood pressure elevates dramatically when standing  Case was discussed with cardiology who recommended closer hypertensive control  Admission to the hospital was offered to the patient which she adamantly declines and states she wishes to go home and follow-up outpatient with her family doctor  Educated regarding risks of leaving without further evaluation  Patient verbalized understanding  CTA of the brain is unremarkable patient verbalized that she will be following up with her PCP as soon as possible  Overall benign physical examination    Patient educated regarding their diagnosis and given return and follow-up instructions  Patient was advised to returned to the ED with worsening symptoms or concerns  Patient is understanding of and in agreement with the treatment plan  There are no questions at the time of discharge  Amount and/or Complexity of Data Reviewed  Clinical lab tests: ordered and reviewed  Tests in the radiology section of CPT®: ordered and reviewed    Risk of Complications, Morbidity, and/or Mortality  Presenting problems: moderate  Diagnostic procedures: low  Management options: low    Patient Progress  Patient progress: stable      Disposition  Final diagnoses:   Dizziness   HTN (hypertension)     Time reflects when diagnosis was documented in both MDM as applicable and the Disposition within this note     Time User Action Codes Description Comment    4/10/2021  7:46 PM Bri Fallow Add [R42] Dizziness     4/10/2021  7:46 PM Bri Fallow Add [I10] HTN (hypertension)       ED Disposition     ED Disposition Condition Date/Time Comment    Discharge Stable Sat Apr 10, 2021  7:46 PM Christiano Hernandez discharge to home/self care              Follow-up Information     Follow up With Specialties Details Why [address], [place] [address]  [phone]            Discharge Medication List as of 4/10/2021  7:47 PM      CONTINUE these medications which have NOT CHANGED    Details   CALCIUM CARBONATE-VITAMIN D PO Take 1 tablet by mouth 2 (two) times a day, Historical Med      citalopram (CeleXA) 20 mg tablet Take 1 tablet (20 mg total) by mouth daily, Starting Wed 12/9/2020, Normal      losartan (COZAAR) 100 MG tablet Take 1 tablet (100 mg total) by mouth daily, Starting Mon 11/30/2020, Normal      Multiple Vitamins-Minerals (PRESERVISION AREDS 2 PO) Take by mouth, Historical Med      multivitamin (THERAGRAN) TABS Take 1 tablet by mouth daily, Historical Med      omeprazole (PriLOSEC OTC) 20 MG tablet Take 20 mg by mouth daily, Historical Med      psyllium (METAMUCIL) 58 6 % packet Take 1 packet by mouth 2 (two) times a day, Historical Med      sodium chloride (OCEAN) 0 65 % nasal spray 1 spray into each nostril as needed for congestion, Historical Med      triamterene-hydrochlorothiazide (MAXZIDE) 75-50 MG per tablet Take 1 tablet by mouth daily, Starting u 4/8/2021, Normal           No discharge procedures on file      PDMP Review     None          ED Provider  Electronically Signed by           Jose Davis PA-C  04/13/21 9673

## 2021-04-12 ENCOUNTER — OFFICE VISIT (OUTPATIENT)
Dept: FAMILY MEDICINE CLINIC | Facility: CLINIC | Age: 66
End: 2021-04-12
Payer: COMMERCIAL

## 2021-04-12 VITALS
SYSTOLIC BLOOD PRESSURE: 136 MMHG | HEART RATE: 75 BPM | BODY MASS INDEX: 34.02 KG/M2 | DIASTOLIC BLOOD PRESSURE: 74 MMHG | OXYGEN SATURATION: 97 % | TEMPERATURE: 97.3 F | HEIGHT: 63 IN | WEIGHT: 192 LBS

## 2021-04-12 DIAGNOSIS — R42 DIZZINESS: Primary | ICD-10-CM

## 2021-04-12 DIAGNOSIS — I10 ESSENTIAL HYPERTENSION: Chronic | ICD-10-CM

## 2021-04-12 PROCEDURE — 3078F DIAST BP <80 MM HG: CPT | Performed by: FAMILY MEDICINE

## 2021-04-12 PROCEDURE — 99213 OFFICE O/P EST LOW 20 MIN: CPT | Performed by: FAMILY MEDICINE

## 2021-04-12 PROCEDURE — 3008F BODY MASS INDEX DOCD: CPT | Performed by: FAMILY MEDICINE

## 2021-04-12 PROCEDURE — 3725F SCREEN DEPRESSION PERFORMED: CPT | Performed by: FAMILY MEDICINE

## 2021-04-12 PROCEDURE — 4004F PT TOBACCO SCREEN RCVD TLK: CPT | Performed by: FAMILY MEDICINE

## 2021-04-12 PROCEDURE — 3075F SYST BP GE 130 - 139MM HG: CPT | Performed by: FAMILY MEDICINE

## 2021-04-12 NOTE — PROGRESS NOTES
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 and should use meclizine 12 5 mg in the morning and at bedtime and p r n  during the day every 6 hours  Watch head motion  It develops any problems with double vision or additional symptoms should recheck  Reviewed the ER report    Essential hypertension   Repeat blood pressure was okay  Would just maintain current medications at this time       Diagnoses and all orders for this visit:    Dizziness    Essential hypertension          Subjective:      Patient ID: Joy Santiago is a 60 y o  female  Patient has history of hypertension, reflux esophagitis, allergic rhinitis, urinary incontinence, stress disorder and hypercholesterolemia  Had developed problems with dizziness several days ago and afterwards did get her COVID vaccine  Perhaps felt a little bit of congestion but was concerned about the dizziness and went to urgent care and then was sent over to the emergency room because of elevated blood pressure  The blood pressure medication was not changed and is still having trouble with the dizziness  No double vision noted and no racing the heart noted as well as no focal weakness      The following portions of the patient's history were reviewed and updated as appropriate: allergies, current medications, past medical history, past social history and problem list       Review of Systems   Constitutional: Negative for fever  HENT: Positive for congestion  Negative for hearing loss  Eyes: Negative for visual disturbance  Respiratory: Negative for chest tightness and shortness of breath  Cardiovascular: Negative for chest pain, palpitations and leg swelling  Gastrointestinal: Negative for abdominal pain  Neurological: Positive for dizziness  Negative for light-headedness and headaches           Objective:      /74 (BP Location: Left arm, Patient Position: Sitting, Cuff Size: Large)   Pulse 75   Temp (!) 97 3 °F (36 3 °C)   Ht 5' 3" (1 6 m)   Wt 87 1 kg (192 lb)   SpO2 97%   BMI 34 01 kg/m²          Physical Exam  Vitals signs and nursing note reviewed  Constitutional:       Appearance: Normal appearance  She is well-developed  She is obese  HENT:      Head: Normocephalic  Nose: Nose normal    Eyes:      Extraocular Movements: Extraocular movements intact  Conjunctiva/sclera: Conjunctivae normal       Pupils: Pupils are equal, round, and reactive to light  Neck:      Musculoskeletal: Neck supple  Thyroid: No thyromegaly  Cardiovascular:      Rate and Rhythm: Normal rate and regular rhythm  Heart sounds: Murmur ( soft systolic murmur at left sternal border  Heart rate is 72) present  Pulmonary:      Effort: Pulmonary effort is normal       Breath sounds: Normal breath sounds  Abdominal:      General: There is no distension  Palpations: Abdomen is soft  There is no mass  Tenderness: There is no abdominal tenderness  Musculoskeletal:      Right lower leg: No edema  Left lower leg: No edema  Lymphadenopathy:      Cervical: No cervical adenopathy  Skin:     General: Skin is warm and dry  Findings: No rash  Neurological:      Mental Status: She is alert  Coordination: Romberg sign negative   Coordination normal       Gait: Gait normal       Deep Tendon Reflexes: Reflexes normal    Psychiatric:         Mood and Affect: Mood normal

## 2021-04-12 NOTE — ASSESSMENT & PLAN NOTE
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

## 2021-04-12 NOTE — PATIENT INSTRUCTIONS
Discussed the problem with the dizziness which I feel represents a problem with the middle ear  Should take meclizine 12 5 mg at bedtime and in the morning when 1st getting  May use periodic Radha every 6 hours during the day if needed  If the 12 5 mg is not enough may take 2 of them  Try to avoid up and down head motion and if getting the dizziness stand still and look across the room at something that is still and it should settle as quickly as possible  May also 1 Aleve a little light on in the bedroom at night  If other symptoms develop should call    This should gradually go away over the next 4-6 weeks with each week being better than the last

## 2021-04-14 ENCOUNTER — TELEPHONE (OUTPATIENT)
Dept: FAMILY MEDICINE CLINIC | Facility: CLINIC | Age: 66
End: 2021-04-14

## 2021-04-14 LAB
ATRIAL RATE: 68 BPM
P AXIS: 60 DEGREES
PR INTERVAL: 174 MS
QRS AXIS: 56 DEGREES
QRSD INTERVAL: 78 MS
QT INTERVAL: 428 MS
QTC INTERVAL: 455 MS
T WAVE AXIS: 60 DEGREES
VENTRICULAR RATE: 68 BPM

## 2021-04-14 PROCEDURE — 93010 ELECTROCARDIOGRAM REPORT: CPT | Performed by: INTERNAL MEDICINE

## 2021-04-14 NOTE — TELEPHONE ENCOUNTER
Phone call from patient  Is taking Meclazine as directed-#1 in am #1 HS  States after about 4 hours it seems to wear off  Directions in AVS states may take prn q6hrs  She is watching her head movements  Any other suggestions?

## 2021-04-27 ENCOUNTER — RA CDI HCC (OUTPATIENT)
Dept: OTHER | Facility: HOSPITAL | Age: 66
End: 2021-04-27

## 2021-04-27 NOTE — PROGRESS NOTES
[email or web address]  coding opportunities          Chart reviewed, no opportunity found: CHART REVIEWED, NO OPPORTUNITY FOUND              Patients insurance company:  [employer] [facility] (Medicare Advantage and Commercial)

## 2021-05-04 ENCOUNTER — OFFICE VISIT (OUTPATIENT)
Dept: FAMILY MEDICINE CLINIC | Facility: CLINIC | Age: 66
End: 2021-05-04
Payer: COMMERCIAL

## 2021-05-04 ENCOUNTER — TELEPHONE (OUTPATIENT)
Dept: ADMINISTRATIVE | Facility: OTHER | Age: 66
End: 2021-05-04

## 2021-05-04 VITALS
BODY MASS INDEX: 33.95 KG/M2 | TEMPERATURE: 97.8 F | WEIGHT: 191.6 LBS | DIASTOLIC BLOOD PRESSURE: 78 MMHG | SYSTOLIC BLOOD PRESSURE: 132 MMHG | OXYGEN SATURATION: 95 % | HEIGHT: 63 IN | HEART RATE: 70 BPM

## 2021-05-04 DIAGNOSIS — F41.1 GENERALIZED ANXIETY DISORDER: Chronic | ICD-10-CM

## 2021-05-04 DIAGNOSIS — K21.00 GASTROESOPHAGEAL REFLUX DISEASE WITH ESOPHAGITIS WITHOUT HEMORRHAGE: Chronic | ICD-10-CM

## 2021-05-04 DIAGNOSIS — I10 ESSENTIAL HYPERTENSION: Chronic | ICD-10-CM

## 2021-05-04 DIAGNOSIS — Z87.891 PERSONAL HISTORY OF NICOTINE DEPENDENCE: ICD-10-CM

## 2021-05-04 DIAGNOSIS — N39.3 STRESS INCONTINENCE OF URINE: Chronic | ICD-10-CM

## 2021-05-04 DIAGNOSIS — Z00.00 WELLNESS EXAMINATION: Primary | ICD-10-CM

## 2021-05-04 PROBLEM — K59.03 DRUG-INDUCED CONSTIPATION: Status: RESOLVED | Noted: 2019-11-07 | Resolved: 2021-05-04

## 2021-05-04 PROCEDURE — 99396 PREV VISIT EST AGE 40-64: CPT | Performed by: FAMILY MEDICINE

## 2021-05-04 PROCEDURE — 4004F PT TOBACCO SCREEN RCVD TLK: CPT | Performed by: FAMILY MEDICINE

## 2021-05-04 PROCEDURE — 3078F DIAST BP <80 MM HG: CPT | Performed by: FAMILY MEDICINE

## 2021-05-04 PROCEDURE — 3008F BODY MASS INDEX DOCD: CPT | Performed by: FAMILY MEDICINE

## 2021-05-04 PROCEDURE — 3075F SYST BP GE 130 - 139MM HG: CPT | Performed by: FAMILY MEDICINE

## 2021-05-04 NOTE — PROGRESS NOTES
ADULT ANNUAL [facility] PRIMARY CARE    NAME: Antolin Dear  AGE: 60 y o  SEX: female  : 1955     DATE: 2021     Assessment and Plan:     Problem List Items Addressed This Visit        Digestive    Gastro-esophageal reflux disease with esophagitis (Chronic)      Asymptomatic, continue current regimen            Cardiovascular and Mediastinum    Essential hypertension (Chronic)      Overall doing well, continue current regimen            Other    Generalized anxiety disorder (Chronic)      Overall doing well, continue current regimen         Stress incontinence of urine (Chronic)      Mildly symptomatic, do Kegel exercises on a regular basis           Other Visit Diagnoses     Wellness examination    -  Primary    Personal history of nicotine dependence              Immunizations and preventive care screenings were discussed with patient today  Appropriate education was printed on patient's after visit summary  Counseling:  Exercise: the importance of regular exercise/physical activity was discussed  Recommend exercise 3-5 times per week for at least 30 minutes  ·  watch starch in diet         Return in about 4 months (around 2021) for Recheck  Chief Complaint:     Chief Complaint   Patient presents with    Annual Exam      History of Present Illness:     Adult Annual Physical   Patient here for a comprehensive physical exam  The patient reports no problems  Diet and Physical Activity  · Diet/Nutrition: poor diet  · Exercise: walking  Depression Screening  PHQ-9 Depression Screening    PHQ-9:   Frequency of the following problems over the past two weeks:           General Health  · Sleep: sleeps well  · Hearing: decreased - bilateral   · Vision: goes for regular eye exams  · Dental: regular dental visits         /GYN Health  · Patient is: postmenopausal  · Last menstrual period:  14 years  · Contraceptive method: Postmenopausal      Review of Systems:     Review of Systems   Constitutional: Negative for activity change, appetite change, chills, fatigue, fever and unexpected weight change  HENT: Negative for congestion, dental problem, hearing loss, rhinorrhea and trouble swallowing  Eyes: Negative for visual disturbance  Respiratory: Negative for apnea, cough, chest tightness and shortness of breath  Cardiovascular: Negative for chest pain, palpitations and leg swelling  Gastrointestinal: Negative for abdominal distention, abdominal pain, constipation and diarrhea  Endocrine: Positive for polyuria ( nocturia x2)  Genitourinary: Positive for enuresis  Negative for difficulty urinating  Musculoskeletal: Positive for arthralgias ( general aches and pains)  Negative for myalgias  Skin: Negative for rash  Allergic/Immunologic: Positive for environmental allergies  Neurological: Negative for dizziness, weakness, light-headedness, numbness and headaches  Hematological: Negative for adenopathy  Does not bruise/bleed easily  Psychiatric/Behavioral: Negative for agitation, dysphoric mood and sleep disturbance  Past Medical History:     Past Medical History:   Diagnosis Date    Allergic rhinitis     Functional disorder of bladder     Hypertension       Past Surgical History:     Past Surgical History:   Procedure Laterality Date    HYSTERECTOMY  04/23/2019    total      Social History:        Social History     Socioeconomic History    Marital status:       Spouse name: None    Number of children: None    Years of education: None    Highest education level: None   Occupational History    None   Social Needs    Financial resource strain: None    Food insecurity     Worry: None     Inability: None    Transportation needs     Medical: None     Non-medical: None   Tobacco Use    Smoking status: Current Every Day Smoker     Packs/day: 1 00     Years: 42 00     Pack years: 42 00 Types: Cigarettes    Smokeless tobacco: Never Used   Substance and Sexual Activity    Alcohol use: Yes     Frequency: Monthly or less     Drinks per session: 1 or 2     Binge frequency: Never     Comment: Occasionally     Drug use: Never    Sexual activity: None   Lifestyle    Physical activity     Days per week: None     Minutes per session: None    Stress: None   Relationships    Social connections     Talks on phone: None     Gets together: None     Attends Worship service: None     Active member of club or organization: None     Attends meetings of clubs or organizations: None     Relationship status: None    Intimate partner violence     Fear of current or ex partner: None     Emotionally abused: None     Physically abused: None     Forced sexual activity: None   Other Topics Concern    None   Social History Narrative    None      Family History:     Family History   Problem Relation Age of Onset    Breast cancer Maternal Aunt         approx 79 yrs of age    Mavis Cousin No Known Problems Mother     No Known Problems Father     No Known Problems Daughter     No Known Problems Maternal Grandmother     No Known Problems Maternal Grandfather     Emphysema Paternal Grandmother     No Known Problems Paternal Grandfather     No Known Problems Paternal Aunt     No Known Problems Paternal Aunt       Current Medications:     Current Outpatient Medications   Medication Sig Dispense Refill    CALCIUM CARBONATE-VITAMIN D PO Take 1 tablet by mouth 2 (two) times a day      citalopram (CeleXA) 20 mg tablet Take 1 tablet (20 mg total) by mouth daily 30 tablet 5    losartan (COZAAR) 100 MG tablet Take 1 tablet (100 mg total) by mouth daily 90 tablet 3    Multiple Vitamins-Minerals (PRESERVISION AREDS 2 PO) Take by mouth      multivitamin (THERAGRAN) TABS Take 1 tablet by mouth daily      omeprazole (PriLOSEC OTC) 20 MG tablet Take 20 mg by mouth daily      psyllium (METAMUCIL) 58 6 % packet Take 1 packet by mouth 3 (three) times a day       triamterene-hydrochlorothiazide (MAXZIDE) 75-50 MG per tablet Take 1 tablet by mouth daily 30 tablet 5    sodium chloride (OCEAN) 0 65 % nasal spray 1 spray into each nostril as needed for congestion       No current facility-administered medications for this visit  Allergies:     No Known Allergies   Physical Exam:     /78 (BP Location: Right arm, Patient Position: Sitting, Cuff Size: Standard)   Pulse 70   Temp 97 8 °F (36 6 °C)   Ht 5' 3" (1 6 m)   Wt 86 9 kg (191 lb 9 6 oz)   SpO2 95%   BMI 33 94 kg/m²     Physical Exam  Vitals signs and nursing note reviewed  Constitutional:       Appearance: Normal appearance  She is not ill-appearing  HENT:      Head: Normocephalic  Right Ear: Tympanic membrane, ear canal and external ear normal  Decreased hearing ( mixed hearing loss with 25 decibel screen  No difficulty with conversation) noted  Left Ear: Tympanic membrane, ear canal and external ear normal  Decreased hearing ( 25 decibelHearing screen is off) noted  Nose: Nose normal       Mouth/Throat:      Mouth: Mucous membranes are moist       Dentition: Abnormal dentition ( multiple missing lower teeth)  Has dentures ( upper)  Eyes:      Extraocular Movements: Extraocular movements intact  Conjunctiva/sclera: Conjunctivae normal       Pupils: Pupils are equal, round, and reactive to light  Neck:      Musculoskeletal: Normal range of motion  Vascular: No carotid bruit  Cardiovascular:      Rate and Rhythm: Normal rate and regular rhythm  Pulses:           Dorsalis pedis pulses are 1+ on the right side and 1+ on the left side  Posterior tibial pulses are 2+ on the right side and 2+ on the left side  Heart sounds: Normal heart sounds  No murmur  Pulmonary:      Effort: Pulmonary effort is normal       Breath sounds: Normal breath sounds  Abdominal:      General: Abdomen is flat  There is no distension  Palpations: Abdomen is soft  There is no mass  Tenderness: There is no abdominal tenderness  Musculoskeletal:         General: No swelling  Right lower leg: No edema  Left lower leg: No edema  Right foot: No deformity  Left foot: No deformity  Feet:      Right foot:      Protective Sensation: 8 sites tested  8 sites sensed  Skin integrity: Skin integrity normal       Left foot:      Protective Sensation: 8 sites tested  8 sites sensed  Skin integrity: Skin integrity normal    Lymphadenopathy:      Cervical: No cervical adenopathy  Skin:     General: Skin is warm and dry  Capillary Refill: Capillary refill takes 2 to 3 seconds  Findings: No rash  Neurological:      General: No focal deficit present  Mental Status: She is alert and oriented to person, place, and time  Cranial Nerves: No cranial nerve deficit  Sensory: No sensory deficit  Motor: No weakness  Coordination: Coordination normal       Deep Tendon Reflexes: Reflexes normal    Psychiatric:         Mood and Affect: Mood normal          Behavior: Behavior normal          Thought Content:  Thought content normal          Judgment: Judgment normal           Amos Tran MD  [facility] PRIMARY CARE

## 2021-05-04 NOTE — TELEPHONE ENCOUNTER
----- Message from Rachell Sullivan sent at 5/4/2021  9:26 AM EDT -----  Regarding: Mammogram  05/04/21 9:27 AM    Hello, our patient Allean Jurist has had Mammogram completed/performed  Please assist in updating the patient chart by pulling the Care Everywhere (CE) document  The date of service is 01/11/2021       Thank you,  Caroline Morelos MA   [facility]

## 2021-05-04 NOTE — TELEPHONE ENCOUNTER
Upon review of the In Basket request we were able to locate, review, and update the patient chart as requested for Mammogram     Any additional questions or concerns should be emailed to the Practice Liaisons via [email or web address] email, please do not reply via In Basket      Thank you  Para Staff

## 2021-05-04 NOTE — PATIENT INSTRUCTIONS
Overall seems to be doing well  Is going to be considering stopping smoking try to continue the ER to work which is good exercise and watch starch in the diet    Will continue on all meds as is

## 2021-07-22 DIAGNOSIS — F33.42 RECURRENT MAJOR DEPRESSIVE DISORDER, IN FULL REMISSION (HCC): ICD-10-CM

## 2021-07-22 RX ORDER — CITALOPRAM 20 MG/1
20 TABLET ORAL DAILY
Qty: 90 TABLET | Refills: 3 | Status: SHIPPED | OUTPATIENT
Start: 2021-07-22 | End: 2021-10-07 | Stop reason: SDUPTHER

## 2021-09-03 ENCOUNTER — RA CDI HCC (OUTPATIENT)
Dept: OTHER | Facility: HOSPITAL | Age: 66
End: 2021-09-03

## 2021-09-03 NOTE — PROGRESS NOTES
[facility]  [facility]       Chart reviewed, no opportunity found: CHART REVIEWED, NO OPPORTUNITY FOUND                        Patients insurance company:  [employer] [facility] (Medicare Advantage and Commercial)

## 2021-09-14 ENCOUNTER — OFFICE VISIT (OUTPATIENT)
Dept: FAMILY MEDICINE CLINIC | Facility: CLINIC | Age: 66
End: 2021-09-14
Payer: MEDICARE

## 2021-09-14 VITALS
WEIGHT: 188 LBS | SYSTOLIC BLOOD PRESSURE: 122 MMHG | HEIGHT: 63 IN | BODY MASS INDEX: 33.31 KG/M2 | OXYGEN SATURATION: 95 % | HEART RATE: 73 BPM | DIASTOLIC BLOOD PRESSURE: 72 MMHG

## 2021-09-14 DIAGNOSIS — I10 ESSENTIAL HYPERTENSION: Primary | ICD-10-CM

## 2021-09-14 DIAGNOSIS — F41.1 GENERALIZED ANXIETY DISORDER: Chronic | ICD-10-CM

## 2021-09-14 DIAGNOSIS — Z23 NEEDS FLU SHOT: ICD-10-CM

## 2021-09-14 DIAGNOSIS — K21.00 GASTROESOPHAGEAL REFLUX DISEASE WITH ESOPHAGITIS WITHOUT HEMORRHAGE: Chronic | ICD-10-CM

## 2021-09-14 DIAGNOSIS — Z23 ENCOUNTER FOR IMMUNIZATION: ICD-10-CM

## 2021-09-14 PROCEDURE — 99213 OFFICE O/P EST LOW 20 MIN: CPT | Performed by: FAMILY MEDICINE

## 2021-09-14 PROCEDURE — 90732 PPSV23 VACC 2 YRS+ SUBQ/IM: CPT

## 2021-09-14 PROCEDURE — G0009 ADMIN PNEUMOCOCCAL VACCINE: HCPCS

## 2021-09-14 RX ORDER — TRIAMTERENE AND HYDROCHLOROTHIAZIDE 75; 50 MG/1; MG/1
1 TABLET ORAL DAILY
Qty: 90 TABLET | Refills: 3 | Status: SHIPPED | OUTPATIENT
Start: 2021-09-14 | End: 2021-09-21

## 2021-09-14 NOTE — PATIENT INSTRUCTIONS
Overall seems to be doing well  Will give Pneumovax today to update immunizations  Will continue on all meds as is    Patient has had flu shot

## 2021-09-14 NOTE — PROGRESS NOTES
Assessment/Plan:    Essential hypertension   Overall stable, continue current regimen    Gastro-esophageal reflux disease with esophagitis   Asymptomatic, continue current regimen    Generalized anxiety disorder   Doing well, continue current regimen       Diagnoses and all orders for this visit:    Essential hypertension  Comments:  Doing well continue current regimen watch salt in diet and push for weight loss  Orders:  -     triamterene-hydrochlorothiazide (MAXZIDE) 75-50 MG per tablet; Take 1 tablet by mouth daily    Encounter for immunization  -     PNEUMOCOCCAL POLYSACCHARIDE VACCINE 23-VALENT =>3YO SQ IM    Needs flu shot    Generalized anxiety disorder    Gastroesophageal reflux disease with esophagitis without hemorrhage    Other orders  -     Cancel: Hepatitis C Antibody ([facility]); Future  -     Cancel: CT lung screening program; Future  -     Cancel: influenza vaccine, high-dose, PF 0 7 mL (FLUZONE HIGH-DOSE)          Subjective:      Patient ID: Alex Sanz is a 73 y o  female  Patient has history of hypertension, reflux esophagitis, allergic rhinitis, urinary incontinence, stress disorder and hypercholesterolemia  Overall has been feeling well      The following portions of the patient's history were reviewed and updated as appropriate: allergies, current medications, past medical history, past social history and problem list   Depression Screening and Follow-up Plan:   Patient was screened for depression during today's encounter  They screened negative with a PHQ-2 score of 0       Review of Systems   Constitutional: Negative for fatigue  HENT: Negative for congestion  Respiratory: Negative for chest tightness and shortness of breath  Cardiovascular: Negative for chest pain, palpitations and leg swelling  Gastrointestinal: Negative for abdominal pain, constipation and diarrhea  Endocrine: Positive for polyuria ( nocturiaTimes 1-2 )     Genitourinary: Positive for enuresis ( occasional)  Allergic/Immunologic: Positive for environmental allergies  Neurological: Negative for dizziness and light-headedness  Psychiatric/Behavioral: Negative for sleep disturbance  Objective:      /72 (BP Location: Left arm, Patient Position: Sitting, Cuff Size: Standard)   Pulse 73   Ht 5' 3" (1 6 m)   Wt 85 3 kg (188 lb)   SpO2 95%   BMI 33 30 kg/m²          Physical Exam  Vitals and nursing note reviewed  Constitutional:       Appearance: Normal appearance  She is well-developed  She is obese  HENT:      Head: Normocephalic  Eyes:      Conjunctiva/sclera: Conjunctivae normal    Neck:      Thyroid: No thyromegaly  Cardiovascular:      Rate and Rhythm: Normal rate and regular rhythm  Heart sounds: Normal heart sounds  No murmur heard  Pulmonary:      Effort: Pulmonary effort is normal       Breath sounds: Normal breath sounds  Abdominal:      General: There is no distension  Palpations: Abdomen is soft  There is no mass  Tenderness: There is no abdominal tenderness  Musculoskeletal:      Cervical back: Neck supple  Right lower leg: Edema present  Left lower leg: No edema  Lymphadenopathy:      Cervical: No cervical adenopathy  Skin:     General: Skin is warm and dry  Findings: No rash  Neurological:      Mental Status: She is alert  Motor: No weakness  Coordination: Coordination normal       Gait: Gait normal       Deep Tendon Reflexes: Reflexes normal    Psychiatric:         Mood and Affect: Mood normal          Behavior: Behavior normal          Thought Content:  Thought content normal          Judgment: Judgment normal

## 2021-10-07 DIAGNOSIS — F33.42 RECURRENT MAJOR DEPRESSIVE DISORDER, IN FULL REMISSION (HCC): ICD-10-CM

## 2021-10-07 RX ORDER — CITALOPRAM 20 MG/1
20 TABLET ORAL DAILY
Qty: 90 TABLET | Refills: 1 | Status: SHIPPED | OUTPATIENT
Start: 2021-10-07 | End: 2022-01-04 | Stop reason: SDUPTHER

## 2021-11-01 DIAGNOSIS — I10 ESSENTIAL HYPERTENSION: Chronic | ICD-10-CM

## 2021-11-01 RX ORDER — LOSARTAN POTASSIUM 100 MG/1
100 TABLET ORAL DAILY
Qty: 90 TABLET | Refills: 1 | Status: SHIPPED | OUTPATIENT
Start: 2021-11-01 | End: 2022-01-04 | Stop reason: SDUPTHER

## 2022-01-04 ENCOUNTER — OFFICE VISIT (OUTPATIENT)
Dept: FAMILY MEDICINE CLINIC | Facility: CLINIC | Age: 67
End: 2022-01-04
Payer: MEDICARE

## 2022-01-04 VITALS
WEIGHT: 190 LBS | BODY MASS INDEX: 33.66 KG/M2 | SYSTOLIC BLOOD PRESSURE: 124 MMHG | HEIGHT: 63 IN | DIASTOLIC BLOOD PRESSURE: 72 MMHG | OXYGEN SATURATION: 98 % | HEART RATE: 76 BPM

## 2022-01-04 DIAGNOSIS — I10 ESSENTIAL HYPERTENSION: ICD-10-CM

## 2022-01-04 DIAGNOSIS — Z87.891 PERSONAL HISTORY OF NICOTINE DEPENDENCE: ICD-10-CM

## 2022-01-04 DIAGNOSIS — Z12.2 ENCOUNTER FOR SCREENING FOR LUNG CANCER: ICD-10-CM

## 2022-01-04 DIAGNOSIS — F33.42 RECURRENT MAJOR DEPRESSIVE DISORDER, IN FULL REMISSION (HCC): ICD-10-CM

## 2022-01-04 DIAGNOSIS — F41.1 GENERALIZED ANXIETY DISORDER: Chronic | ICD-10-CM

## 2022-01-04 DIAGNOSIS — Z00.00 WELCOME TO MEDICARE PREVENTIVE VISIT: Primary | ICD-10-CM

## 2022-01-04 DIAGNOSIS — E78.00 HYPERCHOLESTEROLEMIA: ICD-10-CM

## 2022-01-04 DIAGNOSIS — N39.3 STRESS INCONTINENCE OF URINE: Chronic | ICD-10-CM

## 2022-01-04 DIAGNOSIS — I10 ESSENTIAL HYPERTENSION: Chronic | ICD-10-CM

## 2022-01-04 DIAGNOSIS — K21.00 GASTROESOPHAGEAL REFLUX DISEASE WITH ESOPHAGITIS WITHOUT HEMORRHAGE: Chronic | ICD-10-CM

## 2022-01-04 PROCEDURE — 1123F ACP DISCUSS/DSCN MKR DOCD: CPT | Performed by: FAMILY MEDICINE

## 2022-01-04 PROCEDURE — G0402 INITIAL PREVENTIVE EXAM: HCPCS | Performed by: FAMILY MEDICINE

## 2022-01-04 RX ORDER — CITALOPRAM 20 MG/1
20 TABLET ORAL DAILY
Qty: 90 TABLET | Refills: 1 | Status: SHIPPED | OUTPATIENT
Start: 2022-01-04

## 2022-01-04 RX ORDER — LOSARTAN POTASSIUM 100 MG/1
100 TABLET ORAL DAILY
Qty: 90 TABLET | Refills: 1 | Status: SHIPPED | OUTPATIENT
Start: 2022-01-04 | End: 2022-05-04 | Stop reason: SDUPTHER

## 2022-01-04 RX ORDER — TRIAMTERENE AND HYDROCHLOROTHIAZIDE 75; 50 MG/1; MG/1
1 TABLET ORAL DAILY
Qty: 90 TABLET | Refills: 3 | Status: SHIPPED | OUTPATIENT
Start: 2022-01-04

## 2022-01-04 NOTE — PROGRESS NOTES
Assessment and Plan:     Problem List Items Addressed This Visit        Digestive    Gastro-esophageal reflux disease with esophagitis (Chronic)     Asymptomatic, continue current regimen            Cardiovascular and Mediastinum    Essential hypertension (Chronic)     Blood pressure stable, continue current regimen         Relevant Medications    triamterene-hydrochlorothiazide (MAXZIDE) 75-50 MG per tablet    losartan (COZAAR) 100 MG tablet    Other Relevant Orders    Renal function panel       Other    Generalized anxiety disorder (Chronic)     Doing well, continue current regimen         Relevant Medications    citalopram (CeleXA) 20 mg tablet    Hypercholesterolemia (Chronic)    Relevant Orders    Lipid panel    Stress incontinence of urine (Chronic)     Continue regular Kegel exercises           Other Visit Diagnoses     Welcome to Medicare preventive visit    -  Primary    Encounter for screening for lung cancer        Personal history of nicotine dependence        Recurrent major depressive disorder, in full remission ([facility] [address] )        Relevant Medications    citalopram (CeleXA) 20 mg tablet        BMI Counseling: Body mass index is 33 66 kg/m²  The BMI is above normal  Nutrition recommendations include moderation in carbohydrate intake  Exercise recommendations include moderate physical activity 150 minutes/week  No pharmacotherapy was ordered  Rationale for BMI follow-up plan is due to patient being overweight or obese  Preventive health issues were discussed with patient, and age appropriate screening tests were ordered as noted in patient's After Visit Summary  Personalized health advice and appropriate referrals for health education or preventive services given if needed, as noted in patient's After Visit Summary  History of Present Illness:     Patient presents for Welcome to Medicare visit       Patient Care Team:  Dang Palafox MD as PCP - General (Family Medicine)     Review of Systems: Review of Systems   Constitutional: Negative for activity change, appetite change, chills, fatigue, fever and unexpected weight change  HENT: Negative for congestion, dental problem, hearing loss, rhinorrhea and trouble swallowing  Eyes: Negative for visual disturbance  Respiratory: Negative for apnea, cough, chest tightness and shortness of breath  Cardiovascular: Negative for chest pain, palpitations and leg swelling  Gastrointestinal: Negative for abdominal distention, abdominal pain, constipation and diarrhea  Endocrine: Negative for polyuria (Nocturia times 0-1)  Genitourinary: Negative for enuresis  Musculoskeletal: Positive for arthralgias  Negative for myalgias  Skin: Negative for rash  Allergic/Immunologic: Positive for environmental allergies  Neurological: Negative for dizziness, weakness, light-headedness, numbness and headaches  Hematological: Negative for adenopathy  Psychiatric/Behavioral: Negative for agitation and sleep disturbance        Problem List:     Patient Active Problem List   Diagnosis    Essential hypertension    Hypercholesterolemia    Seasonal allergic rhinitis due to pollen    Generalized anxiety disorder    Stress incontinence of urine    Gastro-esophageal reflux disease with esophagitis    Dizziness      Past Medical and Surgical History:     Past Medical History:   Diagnosis Date    Allergic rhinitis     Functional disorder of bladder     Hypertension      Past Surgical History:   Procedure Laterality Date    HYSTERECTOMY  04/23/2019    total      Family History:     Family History   Problem Relation Age of Onset    Breast cancer Maternal Aunt         approx 79 yrs of age    Eliana Randall No Known Problems Mother     No Known Problems Father     No Known Problems Daughter     No Known Problems Maternal Grandmother     No Known Problems Maternal Grandfather     Emphysema Paternal Grandmother     No Known Problems Paternal Grandfather     No Known Problems Paternal Aunt     No Known Problems Paternal Aunt       Social History:     Social History     Socioeconomic History    Marital status:      Spouse name: None    Number of children: None    Years of education: None    Highest education level: None   Occupational History    None   Tobacco Use    Smoking status: Current Every Day Smoker     Packs/day: 1 00     Years: 42 00     Pack years: 42 00     Types: Cigarettes    Smokeless tobacco: Never Used   Vaping Use    Vaping Use: Never used   Substance and Sexual Activity    Alcohol use: Yes     Comment: Occasionally     Drug use: Never    Sexual activity: None   Other Topics Concern    None   Social History Narrative    None     Social Determinants of Health     Financial Resource Strain: Not on file   Food Insecurity: Not on file   Transportation Needs: Not on file   Physical Activity: Not on file   Stress: Not on file   Social Connections: Not on file   Intimate Partner Violence: Not on file   Housing Stability: Not on file      Medications and Allergies:     Current Outpatient Medications   Medication Sig Dispense Refill    CALCIUM CARBONATE-VITAMIN D PO Take 1 tablet by mouth 2 (two) times a day      citalopram (CeleXA) 20 mg tablet Take 1 tablet (20 mg total) by mouth daily 90 tablet 1    losartan (COZAAR) 100 MG tablet Take 1 tablet (100 mg total) by mouth daily 90 tablet 1    Multiple Vitamins-Minerals (PRESERVISION AREDS 2 PO) Take by mouth      multivitamin (THERAGRAN) TABS Take 1 tablet by mouth daily      omeprazole (PriLOSEC OTC) 20 MG tablet Take 20 mg by mouth daily      psyllium (METAMUCIL) 58 6 % packet Take 1 packet by mouth 3 (three) times a day       sodium chloride (OCEAN) 0 65 % nasal spray 1 spray into each nostril as needed for congestion      triamterene-hydrochlorothiazide (MAXZIDE) 75-50 MG per tablet Take 1 tablet by mouth daily 90 tablet 3     No current facility-administered medications for this visit  No Known Allergies   Immunizations:     Immunization History   Administered Date(s) Administered    COVID-19 MODERNA VACC 0 5 ML IM 03/21/2021, 04/21/2021, 10/28/2021    INFLUENZA 01/05/2009, 10/01/2009, 11/08/2010, 09/16/2011, 08/29/2013, 09/11/2013, 10/10/2014, 10/02/2015, 09/30/2016, 09/08/2017, 09/07/2018, 09/13/2019, 08/24/2021    Influenza, injectable, quadrivalent, preservative free 0 5 mL 09/07/2018, 09/13/2019, 09/04/2020    Influenza, seasonal, injectable 01/04/2009, 10/01/2009, 11/07/2010, 09/16/2011, 10/02/2015, 09/30/2016    Influenza, seasonal, injectable, preservative free 08/29/2013    Pneumococcal Polysaccharide PPV23 09/14/2021    Tdap 05/07/2014    Zoster Vaccine Recombinant 11/26/2018, 03/27/2019      Health Maintenance:         Topic Date Due    Hepatitis C Screening  Never done    HIV Screening  Never done    Lung Cancer Screening  Never done    Breast Cancer Screening: Mammogram  01/11/2022    Colorectal Cancer Screening  08/14/2024     There are no preventive care reminders to display for this patient  Medicare Screening Tests and Risk Assessments:     Lashanda Gaona is here for her Welcome to Medicare visit  Health Risk Assessment:   Patient rates overall health as good  Patient feels that their physical health rating is same  Patient is satisfied with their life  Eyesight was rated as same  Hearing was rated as same  Patient feels that their emotional and mental health rating is same  Patients states they are never, rarely angry  Patient states they are sometimes unusually tired/fatigued  Pain experienced in the last 7 days has been none  Patient states that she has experienced no weight loss or gain in last 6 months  No issues    Fall Risk Screening: In the past year, patient has experienced: no history of falling in past year      Urinary Incontinence Screening:   Patient has leaked urine accidently in the last six months   Should do her Kegel exercises on a regular basis    Home Safety:  Patient does not have trouble with stairs inside or outside of their home  Patient has working smoke alarms and has no working carbon monoxide detector  Home safety hazards include: none  I did advise carbon monoxide monitor in the house and grab bars in the bathroom    Nutrition:   Current diet is Low Cholesterol and No Added Salt  Watch starch in diet    Medications:   Patient is currently taking over-the-counter supplements  OTC medications include: Vitamens calcium pills metamucil areds preser vision 2 fish oil  Patient is able to manage medications  No issues    Activities of Daily Living (ADLs)/Instrumental Activities of Daily Living (IADLs):   Walk and transfer into and out of bed and chair?: Yes  Dress and groom yourself?: Yes    Bathe or shower yourself?: Yes    Feed yourself? Yes  Do your laundry/housekeeping?: Yes  Manage your money, pay your bills and track your expenses?: Yes  Make your own meals?: Yes    Do your own shopping?: Yes    ADL comments: Overall patient is very functional at home  No issues    Previous Hospitalizations:   Any hospitalizations or ED visits within the last 12 months?: No      Hospitalization Comments: Zero    Advance Care Planning:   Living will: Yes    Durable POA for healthcare:  Yes    Advanced directive: Yes      Comments: Should bring this into the office to have scanned into the chart    Cognitive Screening:   Provider or family/friend/caregiver concerned regarding cognition?: No    PREVENTIVE SCREENINGS      Cardiovascular Screening:    General: Screening Not Indicated and History Lipid Disorder    Due for: Lipid Panel      Diabetes Screening:     General: Screening Current    Due for: Blood Glucose      Colorectal Cancer Screening:     General: Screening Current      Breast Cancer Screening:     General: Screening Current      Cervical Cancer Screening:    General: Screening Not Indicated      Osteoporosis Screening:    General: Risks and Benefits Discussed      Abdominal Aortic Aneurysm (AAA) Screening:        General: Screening Not Indicated      Lung Cancer Screening:     General: Risks and Benefits Discussed      Hepatitis C Screening:    General: Screening Current      Preventive Screening Comments: Continue follow-up with routine eye care and have reports forwarded to office    Screening, Brief Intervention, and Referral to Treatment (SBIRT)    Screening  Typical number of drinks in a day: 0  Typical number of drinks in a week: 0  Interpretation: Low risk drinking behavior  AUDIT-C Screenin) How often did you have a drink containing alcohol in the past year? never  2) How many drinks did you have on a typical day when you were drinking in the past year? 0  3) How often did you have 6 or more drinks on one occasion in the past year? never    AUDIT-C Score: 0  Interpretation: Score 0-2 (female): Negative screen for alcohol misuse    Single Item Drug Screening:  How often have you used an illegal drug (including marijuana) or a prescription medication for non-medical reasons in the past year? never    Single Item Drug Screen Score: 0  Interpretation: Negative screen for possible drug use disorder    Brief Intervention  Alcohol & drug use screenings were reviewed  No concerns regarding substance use disorder identified  No exam data present     Physical Exam:     /72 (BP Location: Left arm, Patient Position: Sitting, Cuff Size: Standard)   Pulse 76   Ht 5' 3" (1 6 m)   Wt 86 2 kg (190 lb)   SpO2 98%   BMI 33 66 kg/m²     Physical Exam  Cardiovascular:      Pulses:           Dorsalis pedis pulses are 2+ on the right side and 2+ on the left side  Posterior tibial pulses are 1+ on the right side and 1+ on the left side  Musculoskeletal:      Right foot: No deformity  Left foot: No deformity  Feet:      Right foot:      Protective Sensation: 8 sites tested  8 sites sensed        Skin integrity: Skin integrity normal  Left foot:      Protective Sensation: 8 sites tested  8 sites sensed        Skin integrity: Skin integrity normal           Shree Carney MD

## 2022-01-04 NOTE — PATIENT INSTRUCTIONS
Medicare Preventive Visit Patient Instructions  Thank you for completing your Welcome to Medicare Visit or Medicare Annual Wellness Visit today  Your next wellness visit will be due in one year (1/5/2023)  The screening/preventive services that you may require over the next 5-10 years are detailed below  Some tests may not apply to you based off risk factors and/or age  Screening tests ordered at today's visit but not completed yet may show as past due  Also, please note that scanned in results may not display below  Preventive Screenings:  Service Recommendations Previous Testing/Comments   Colorectal Cancer Screening  * Colonoscopy    * Fecal Occult Blood Test (FOBT)/Fecal Immunochemical Test (FIT)  * Fecal DNA/Cologuard Test  * Flexible Sigmoidoscopy Age: 54-65 years old   Colonoscopy: every 10 years (may be performed more frequently if at higher risk)  OR  FOBT/FIT: every 1 year  OR  Cologuard: every 3 years  OR  Sigmoidoscopy: every 5 years  Screening may be recommended earlier than age 48 if at higher risk for colorectal cancer  Also, an individualized decision between you and your healthcare provider will decide whether screening between the ages of 74-80 would be appropriate  Colonoscopy: 08/14/2020  FOBT/FIT: Not on file  Cologuard: Not on file  Sigmoidoscopy: Not on file    Screening Current     Breast Cancer Screening Age: 36 years old  Frequency: every 1-2 years  Not required if history of left and right mastectomy Mammogram: 01/11/2021    Screening Current   Cervical Cancer Screening Between the ages of 21-29, pap smear recommended once every 3 years  Between the ages of 33-67, can perform pap smear with HPV co-testing every 5 years     Recommendations may differ for women with a history of total hysterectomy, cervical cancer, or abnormal pap smears in past  Pap Smear: 01/16/2020    Screening Not Indicated   Hepatitis C Screening Once for adults born between 1945 and 1965  More frequently in patients at high risk for Hepatitis C Hep C Antibody: Not on file        Diabetes Screening 1-2 times per year if you're at risk for diabetes or have pre-diabetes Fasting glucose: 91 mg/dL   A1C: No results in last 5 years    Screening Current   Cholesterol Screening Once every 5 years if you don't have a lipid disorder  May order more often based on risk factors  Lipid panel: 12/30/2020    Screening Not Indicated  History Lipid Disorder     Other Preventive Screenings Covered by Medicare:  1  Abdominal Aortic Aneurysm (AAA) Screening: covered once if your at risk  You're considered to be at risk if you have a family history of AAA  2  Lung Cancer Screening: covers low dose CT scan once per year if you meet all of the following conditions: (1) Age 50-69; (2) No signs or symptoms of lung cancer; (3) Current smoker or have quit smoking within the last 15 years; (4) You have a tobacco smoking history of at least 30 pack years (packs per day multiplied by number of years you smoked); (5) You get a written order from a healthcare provider  3  Glaucoma Screening: covered annually if you're considered high risk: (1) You have diabetes OR (2) Family history of glaucoma OR (3)  aged 48 and older OR (3)  American aged 72 and older  3  Osteoporosis Screening: covered every 2 years if you meet one of the following conditions: (1) You're estrogen deficient and at risk for osteoporosis based off medical history and other findings; (2) Have a vertebral abnormality; (3) On glucocorticoid therapy for more than 3 months; (4) Have primary hyperparathyroidism; (5) On osteoporosis medications and need to assess response to drug therapy  · Last bone density test (DXA Scan): 09/26/2017  5  HIV Screening: covered annually if you're between the age of 12-76  Also covered annually if you are younger than 13 and older than 72 with risk factors for HIV infection   For pregnant patients, it is covered up to 3 times per pregnancy  Immunizations:  Immunization Recommendations   Influenza Vaccine Annual influenza vaccination during flu season is recommended for all persons aged >= 6 months who do not have contraindications   Pneumococcal Vaccine (Prevnar and Pneumovax)  * Prevnar = PCV13  * Pneumovax = PPSV23   Adults 25-60 years old: 1-3 doses may be recommended based on certain risk factors  Adults 72 years old: Prevnar (PCV13) vaccine recommended followed by Pneumovax (PPSV23) vaccine  If already received PPSV23 since turning 65, then PCV13 recommended at least one year after PPSV23 dose  Hepatitis B Vaccine 3 dose series if at intermediate or high risk (ex: diabetes, end stage renal disease, liver disease)   Tetanus (Td) Vaccine - COST NOT COVERED BY MEDICARE PART B Following completion of primary series, a booster dose should be given every 10 years to maintain immunity against tetanus  Td may also be given as tetanus wound prophylaxis  Tdap Vaccine - COST NOT COVERED BY MEDICARE PART B Recommended at least once for all adults  For pregnant patients, recommended with each pregnancy  Shingles Vaccine (Shingrix) - COST NOT COVERED BY MEDICARE PART B  2 shot series recommended in those aged 48 and above     Health Maintenance Due:      Topic Date Due    Hepatitis C Screening  Never done    HIV Screening  Never done    Lung Cancer Screening  Never done    Breast Cancer Screening: Mammogram  01/11/2022    Colorectal Cancer Screening  08/14/2024     Immunizations Due:      Topic Date Due    COVID-19 Vaccine (3 - Booster for Katelyn Kenning series) 10/21/2021     Advance Directives   What are advance directives? Advance directives are legal documents that state your wishes and plans for medical care  These plans are made ahead of time in case you lose your ability to make decisions for yourself  Advance directives can apply to any medical decision, such as the treatments you want, and if you want to donate organs     What are the types of advance directives? There are many types of advance directives, and each state has rules about how to use them  You may choose a combination of any of the following:  · Living will: This is a written record of the treatment you want  You can also choose which treatments you do not want, which to limit, and which to stop at a certain time  This includes surgery, medicine, IV fluid, and tube feedings  · Durable power of  for healthcare [facility] This is a written record that states who you want to make healthcare choices for you when you are unable to make them for yourself  This person, called a proxy, is usually a family member or a friend  You may choose more than 1 proxy  · Do not resuscitate (DNR) order:  A DNR order is used in case your heart stops beating or you stop breathing  It is a request not to have certain forms of treatment, such as CPR  A DNR order may be included in other types of advance directives  · Medical directive: This covers the care that you want if you are in a coma, near death, or unable to make decisions for yourself  You can list the treatments you want for each condition  Treatment may include pain medicine, surgery, blood transfusions, dialysis, IV or tube feedings, and a ventilator (breathing machine)  · Values history: This document has questions about your views, beliefs, and how you feel and think about life  This information can help others choose the care that you would choose  Why are advance directives important? An advance directive helps you control your care  Although spoken wishes may be used, it is better to have your wishes written down  Spoken wishes can be misunderstood, or not followed  Treatments may be given even if you do not want them  An advance directive may make it easier for your family to make difficult choices about your care  Urinary Incontinence   Urinary incontinence (UI)  is when you lose control of your bladder   UI develops because your bladder cannot store or empty urine properly  The 3 most common types of UI are stress incontinence, urge incontinence, or both  Medicines:   · May be given to help strengthen your bladder control  Report any side effects of medication to your healthcare provider  Do pelvic muscle exercises often:  Your pelvic muscles help you stop urinating  Squeeze these muscles tight for 5 seconds, then relax for 5 seconds  Gradually work up to squeezing for 10 seconds  Do 3 sets of 15 repetitions a day, or as directed  This will help strengthen your pelvic muscles and improve bladder control  Train your bladder:  Go to the bathroom at set times, such as every 2 hours, even if you do not feel the urge to go  You can also try to hold your urine when you feel the urge to go  For example, hold your urine for 5 minutes when you feel the urge to go  As that becomes easier, hold your urine for 10 minutes  Self-care:   · Keep a UI record  Write down how often you leak urine and how much you leak  Make a note of what you were doing when you leaked urine  · Drink liquids as directed  You may need to limit the amount of liquid you drink to help control your urine leakage  Do not drink any liquid right before you go to bed  Limit or do not have drinks that contain caffeine or alcohol  · Prevent constipation  Eat a variety of high-fiber foods  Good examples are high-fiber cereals, beans, vegetables, and whole-grain breads  Walking is the best way to trigger your intestines to have a bowel movement  · Exercise regularly and maintain a healthy weight  Weight loss and exercise will decrease pressure on your bladder and help you control your leakage  · Use a catheter as directed  to help empty your bladder  A catheter is a tiny, plastic tube that is put into your bladder to drain your urine  · Go to behavior therapy as directed    Behavior therapy may be used to help you learn to control your urge to urinate  Cigarette Smoking and Your Health   Risks to your health if you smoke:  Nicotine and other chemicals found in tobacco damage every cell in your body  Even if you are a light smoker, you have an increased risk for cancer, heart disease, and lung disease  If you are pregnant or have diabetes, smoking increases your risk for complications  Benefits to your health if you stop smoking:   · You decrease respiratory symptoms such as coughing, wheezing, and shortness of breath  · You reduce your risk for cancers of the lung, mouth, throat, kidney, bladder, pancreas, stomach, and cervix  If you already have cancer, you increase the benefits of chemotherapy  You also reduce your risk for cancer returning or a second cancer from developing  · You reduce your risk for heart disease, blood clots, heart attack, and stroke  · You reduce your risk for lung infections, and diseases such as pneumonia, asthma, chronic bronchitis, and emphysema  · Your circulation improves  More oxygen can be delivered to your body  If you have diabetes, you lower your risk for complications, such as kidney, artery, and eye diseases  You also lower your risk for nerve damage  Nerve damage can lead to amputations, poor vision, and blindness  · You improve your body's ability to heal and to fight infections  For more information and support to stop smoking:   · [employer]  Phone: 6- 651 - 867-2531  Web Address: www rumr: turn off the lights  Weight Management   Why it is important to manage your weight:  Being overweight increases your risk of health conditions such as heart disease, high blood pressure, type 2 diabetes, and certain types of cancer  It can also increase your risk for osteoarthritis, sleep apnea, and other respiratory problems  Aim for a slow, steady weight loss  Even a small amount of weight loss can lower your risk of health problems    How to lose weight safely:  A safe and healthy way to lose weight is to eat fewer calories and get regular exercise  You can lose up about 1 pound a week by decreasing the number of calories you eat by 500 calories each day  Healthy meal plan for weight management:  A healthy meal plan includes a variety of foods, contains fewer calories, and helps you stay healthy  A healthy meal plan includes the following:  · Eat whole-grain foods more often  A healthy meal plan should contain fiber  Fiber is the part of grains, fruits, and vegetables that is not broken down by your body  Whole-grain foods are healthy and provide extra fiber in your diet  Some examples of whole-grain foods are whole-wheat breads and pastas, oatmeal, brown rice, and bulgur  · Eat a variety of vegetables every day  Include dark, leafy greens such as spinach, kale, ameya greens, and mustard greens  Eat yellow and orange vegetables such as carrots, sweet potatoes, and winter squash  · Eat a variety of fruits every day  Choose fresh or canned fruit (canned in its own juice or light syrup) instead of juice  Fruit juice has very little or no fiber  · Eat low-fat dairy foods  Drink fat-free (skim) milk or 1% milk  Eat fat-free yogurt and low-fat cottage cheese  Try low-fat cheeses such as mozzarella and other reduced-fat cheeses  · Choose meat and other protein foods that are low in fat  Choose beans or other legumes such as split peas or lentils  Choose fish, skinless poultry (chicken or turkey), or lean cuts of red meat (beef or pork)  Before you cook meat or poultry, cut off any visible fat  · Use less fat and oil  Try baking foods instead of frying them  Add less fat, such as margarine, sour cream, regular salad dressing and mayonnaise to foods  Eat fewer high-fat foods  Some examples of high-fat foods include french fries, doughnuts, ice cream, and cakes  · Eat fewer sweets  Limit foods and drinks that are high in sugar  This includes candy, cookies, regular soda, and sweetened drinks    Exercise:  Exercise at least 30 minutes per day on most days of the week  Some examples of exercise include walking, biking, dancing, and swimming  You can also fit in more physical activity by taking the stairs instead of the elevator or parking farther away from stores  Ask your healthcare provider about the best exercise plan for you  © Copyright [employer] 2018 Information is for End User's use only and may not be sold, redistributed or otherwise used for commercial purposes  All illustrations and images included in CareNotes® are the copyrighted property of [employer]  or [address] [place] [address]   overall patient is very functional   Will check renal panel for history of hypertension and lipid panel for history of hypercholesterolemia  Has had the COVID booster as well as flu shot    Did discuss the screening CT scan of the lungs and is going to consider in the future and may want to consider repeating the bone density

## 2022-01-14 ENCOUNTER — LAB (OUTPATIENT)
Dept: LAB | Facility: CLINIC | Age: 67
End: 2022-01-14
Payer: MEDICARE

## 2022-01-14 DIAGNOSIS — E78.00 HYPERCHOLESTEROLEMIA: ICD-10-CM

## 2022-01-14 DIAGNOSIS — I10 ESSENTIAL HYPERTENSION: Chronic | ICD-10-CM

## 2022-01-14 LAB
ALBUMIN SERPL BCP-MCNC: 4.2 G/DL (ref 3.5–5)
ANION GAP SERPL CALCULATED.3IONS-SCNC: 5 MMOL/L (ref 4–13)
BUN SERPL-MCNC: 11 MG/DL (ref 5–25)
CALCIUM SERPL-MCNC: 9.6 MG/DL (ref 8.3–10.1)
CHLORIDE SERPL-SCNC: 98 MMOL/L (ref 100–108)
CHOLEST SERPL-MCNC: 283 MG/DL
CO2 SERPL-SCNC: 33 MMOL/L (ref 21–32)
CREAT SERPL-MCNC: 0.67 MG/DL (ref 0.6–1.3)
GFR SERPL CREATININE-BSD FRML MDRD: 92 ML/MIN/1.73SQ M
GLUCOSE P FAST SERPL-MCNC: 87 MG/DL (ref 65–99)
HDLC SERPL-MCNC: 38 MG/DL
NONHDLC SERPL-MCNC: 245 MG/DL
PHOSPHATE SERPL-MCNC: 3.6 MG/DL (ref 2.3–4.1)
POTASSIUM SERPL-SCNC: 3.6 MMOL/L (ref 3.5–5.3)
SODIUM SERPL-SCNC: 136 MMOL/L (ref 136–145)
TRIGL SERPL-MCNC: 473 MG/DL

## 2022-01-14 PROCEDURE — 36415 COLL VENOUS BLD VENIPUNCTURE: CPT

## 2022-01-14 PROCEDURE — 80069 RENAL FUNCTION PANEL: CPT

## 2022-01-14 PROCEDURE — 80061 LIPID PANEL: CPT

## 2022-01-15 NOTE — RESULT ENCOUNTER NOTE
Please call the patient regarding her abnormal result triglyceride levels are high  Needs to watch sweets and starch in diet and should take fish oil 2000 mg daily    Otherwise labs are okay

## 2022-01-17 ENCOUNTER — TELEPHONE (OUTPATIENT)
Dept: FAMILY MEDICINE CLINIC | Facility: CLINIC | Age: 67
End: 2022-01-17

## 2022-01-17 NOTE — TELEPHONE ENCOUNTER
----- Message from Nicole Carrillo MD sent at 1/15/2022  2:26 PM EST -----  Please call the patient regarding her abnormal result triglyceride levels are high  Needs to watch sweets and starch in diet and should take fish oil 2000 mg daily  Otherwise labs are okay    It appears patient saw results via [employer] but I left a message requesting the patient call back to go over the recommendations

## 2022-02-14 ENCOUNTER — TELEPHONE (OUTPATIENT)
Dept: ADMINISTRATIVE | Facility: OTHER | Age: 67
End: 2022-02-14

## 2022-02-14 NOTE — TELEPHONE ENCOUNTER
Upon review of the In Basket request we were able to locate, review, and update the patient chart as requested for DEXA Scan  Any additional questions or concerns should be emailed to the Practice Liaisons via [employer] email, please do not reply via In Basket  Thank you  Elenita Nichols     Upon review of the In Basket request we were able to locate, review, and update the patient chart as requested for Mammogram     Any additional questions or concerns should be emailed to the Practice Liaisons via [email or web address] email, please do not reply via In Basket      Thank you  Elenita Nichols

## 2022-02-14 NOTE — TELEPHONE ENCOUNTER
----- Message from Evie Ellsworth sent at 2/14/2022  9:17 AM EST -----  Regarding: dexa & mammo [facility]  02/14/22 9:17 AM    Hello, our patient Amita Coffman has had DEXA Scan and Mammogram completed/performed  Please assist in updating the patient chart by pulling the Care Everywhere (CE) document  The date of service for the dexa scan is 2/10/22    The date of service for the mammo is 01/18/22     Thank you,  Evie Ellsworth   [facility]

## 2022-02-16 ENCOUNTER — OFFICE VISIT (OUTPATIENT)
Dept: FAMILY MEDICINE CLINIC | Facility: CLINIC | Age: 67
End: 2022-02-16
Payer: MEDICARE

## 2022-02-16 ENCOUNTER — TELEPHONE (OUTPATIENT)
Dept: FAMILY MEDICINE CLINIC | Facility: CLINIC | Age: 67
End: 2022-02-16

## 2022-02-16 VITALS
HEIGHT: 63 IN | BODY MASS INDEX: 32.25 KG/M2 | DIASTOLIC BLOOD PRESSURE: 74 MMHG | OXYGEN SATURATION: 97 % | HEART RATE: 71 BPM | WEIGHT: 182 LBS | SYSTOLIC BLOOD PRESSURE: 122 MMHG

## 2022-02-16 DIAGNOSIS — M85.89 OSTEOPENIA OF MULTIPLE SITES: Chronic | ICD-10-CM

## 2022-02-16 DIAGNOSIS — L30.9 DERMATITIS: Primary | Chronic | ICD-10-CM

## 2022-02-16 PROCEDURE — 99213 OFFICE O/P EST LOW 20 MIN: CPT | Performed by: FAMILY MEDICINE

## 2022-02-16 NOTE — PROGRESS NOTES
Assessment/Plan:    Dermatitis  This appears to be mild psoriasis of the scalp  Should use Neutrogena T-Sal shampoo twice a week    Osteopenia of multiple sites  Reviewed the DEXA scan report  Should make sure is taking 1000 mg of calcium supplement daily and 1000 units of vitamin-D 3  Continue the daily walking activity which should help with the bone density       Diagnoses and all orders for this visit:    Dermatitis    Osteopenia of multiple sites          Subjective:      Patient ID: Ebony Buerger is a 73 y o  female  Patient has history of hypertension, reflux esophagitis, allergic rhinitis, urinary incontinence, stress disorder and hypercholesterolemia  Seen today as follow-up for osteopenia that was picked up with DEXA scan  Does take calcium and vitamin-D supplement although does not drink much milk product  Also has had rash in the scalp which she had tried Nizoral shampoo for which did not help and still has some problems with scaling and itching      The following portions of the patient's history were reviewed and updated as appropriate: allergies, current medications, past medical history, past social history and problem list       Review of Systems   Constitutional: Negative for activity change  Musculoskeletal: Negative for arthralgias  Skin: Positive for rash (On the scalp)  Hematological: Negative for adenopathy  Objective:      /74 (BP Location: Left arm, Patient Position: Sitting, Cuff Size: Standard)   Pulse 71   Ht 5' 3" (1 6 m)   Wt 82 6 kg (182 lb)   SpO2 97%   BMI 32 24 kg/m²          Physical Exam  Vitals and nursing note reviewed  Constitutional:       Appearance: Normal appearance  She is well-developed  HENT:      Head: Normocephalic  Eyes:      Conjunctiva/sclera: Conjunctivae normal    Neck:      Thyroid: No thyromegaly  Cardiovascular:      Rate and Rhythm: Normal rate and regular rhythm  Heart sounds: Normal heart sounds   No murmur (Rate is 77) heard  Pulmonary:      Effort: Pulmonary effort is normal       Breath sounds: Normal breath sounds  Abdominal:      General: There is no distension  Palpations: Abdomen is soft  There is no mass  Tenderness: There is no abdominal tenderness  Musculoskeletal:      Cervical back: Neck supple  No tenderness  Right lower leg: No edema  Left lower leg: No edema  Lymphadenopathy:      Cervical: No cervical adenopathy  Skin:     General: Skin is warm and dry  Findings: No rash  Neurological:      Mental Status: She is alert  Motor: No weakness        Coordination: Coordination normal       Gait: Gait normal    Psychiatric:         Mood and Affect: Mood normal

## 2022-02-16 NOTE — ASSESSMENT & PLAN NOTE
Reviewed the DEXA scan report  Should make sure is taking 1000 mg of calcium supplement daily and 1000 units of vitamin-D 3   Continue the daily walking activity which should help with the bone density

## 2022-02-16 NOTE — PATIENT INSTRUCTIONS
Discussed the problem with the bone density  Does have osteopenia  Should be taking a calcium supplement totaling 1000 mg daily and vitamin D3 totaling  1000 units  Try to get daily walking activity in which will improve the bone strength of the lower body  Will need to repeat the DEXA scan in 2-3 years    For what looks like psoriasis of the scalp which did not respond to Nizoral shampoo try using Neutrogena T- Sal twice a week

## 2022-02-16 NOTE — TELEPHONE ENCOUNTER
Her calcium + vitamin d lists it as 20mcg  She was wondering what that equates to with the units as you told her 1000 units

## 2022-05-04 ENCOUNTER — OFFICE VISIT (OUTPATIENT)
Dept: FAMILY MEDICINE CLINIC | Facility: CLINIC | Age: 67
End: 2022-05-04
Payer: MEDICARE

## 2022-05-04 VITALS
BODY MASS INDEX: 29.95 KG/M2 | HEART RATE: 70 BPM | WEIGHT: 169 LBS | DIASTOLIC BLOOD PRESSURE: 82 MMHG | OXYGEN SATURATION: 94 % | SYSTOLIC BLOOD PRESSURE: 128 MMHG | HEIGHT: 63 IN

## 2022-05-04 DIAGNOSIS — J30.1 SEASONAL ALLERGIC RHINITIS DUE TO POLLEN: Chronic | ICD-10-CM

## 2022-05-04 DIAGNOSIS — K21.00 GASTROESOPHAGEAL REFLUX DISEASE WITH ESOPHAGITIS WITHOUT HEMORRHAGE: Chronic | ICD-10-CM

## 2022-05-04 DIAGNOSIS — F41.1 GENERALIZED ANXIETY DISORDER: Chronic | ICD-10-CM

## 2022-05-04 DIAGNOSIS — N39.3 STRESS INCONTINENCE OF URINE: Chronic | ICD-10-CM

## 2022-05-04 DIAGNOSIS — I10 ESSENTIAL HYPERTENSION: Primary | Chronic | ICD-10-CM

## 2022-05-04 PROBLEM — R42 DIZZINESS: Status: RESOLVED | Noted: 2021-04-12 | Resolved: 2022-05-04

## 2022-05-04 PROCEDURE — 99214 OFFICE O/P EST MOD 30 MIN: CPT | Performed by: FAMILY MEDICINE

## 2022-05-04 RX ORDER — LOSARTAN POTASSIUM 100 MG/1
100 TABLET ORAL DAILY
Qty: 90 TABLET | Refills: 3 | Status: SHIPPED | OUTPATIENT
Start: 2022-05-04

## 2022-05-04 NOTE — PROGRESS NOTES
Assessment/Plan:    Essential hypertension  Overall stable, continue current regimen    Gastro-esophageal reflux disease with esophagitis  Asymptomatic, continue current regimen    Generalized anxiety disorder  Seems to be doing well, continue current regimen    Seasonal allergic rhinitis due to pollen  Intermittently symptomatic continue p r n  Meds    Stress incontinence of urine  Continue Kegel exercises on a regular basis       Diagnoses and all orders for this visit:    Essential hypertension  -     losartan (COZAAR) 100 MG tablet; Take 1 tablet (100 mg total) by mouth daily    Gastroesophageal reflux disease with esophagitis without hemorrhage    Generalized anxiety disorder    Seasonal allergic rhinitis due to pollen    Stress incontinence of urine          Subjective:      Patient ID: Nicolasa Zepeda is a 73 y o  female  Patient has history of hypertension, reflux esophagitis, allergic rhinitis, urinary incontinence, stress disorder and hypercholesterolemia  Overall has been feeling well      The following portions of the patient's history were reviewed and updated as appropriate: allergies, current medications, past medical history, past social history and problem list   Depression Screening and Follow-up Plan: Patient was screened for depression during today's encounter  They screened negative with a PHQ-2 score of 0  Tobacco Cessation Counseling: Tobacco cessation counseling was provided  The patient is sincerely urged to quit consumption of tobacco  She is not ready to quit tobacco  Medication options not discussed       Review of Systems   Constitutional: Negative for activity change, appetite change, chills, fatigue, fever and unexpected weight change  HENT: Negative for congestion, rhinorrhea and trouble swallowing  Eyes: Negative for visual disturbance  Respiratory: Negative for apnea, cough, chest tightness and shortness of breath      Cardiovascular: Negative for chest pain, palpitations and leg swelling  Gastrointestinal: Negative for abdominal distention, abdominal pain, constipation and diarrhea  Endocrine: Positive for polyuria (Nocturia times 1-2)  Genitourinary: Positive for enuresis  Negative for difficulty urinating  Musculoskeletal: Negative for arthralgias and myalgias  Skin: Negative for rash  Allergic/Immunologic: Positive for environmental allergies  Neurological: Negative for dizziness, weakness, light-headedness, numbness and headaches  Hematological: Negative for adenopathy  Psychiatric/Behavioral: Positive for confusion  Negative for agitation and sleep disturbance  Objective:      /82 (BP Location: Left arm, Patient Position: Sitting, Cuff Size: Large)   Pulse 70   Ht 5' 3" (1 6 m)   Wt 76 7 kg (169 lb)   SpO2 94%   BMI 29 94 kg/m²          Physical Exam  Vitals and nursing note reviewed  Constitutional:       Appearance: Normal appearance  She is well-developed  HENT:      Head: Normocephalic  Nose: Nose normal    Eyes:      Conjunctiva/sclera: Conjunctivae normal    Neck:      Thyroid: No thyromegaly  Cardiovascular:      Rate and Rhythm: Normal rate and regular rhythm  Heart sounds: Normal heart sounds  No murmur (Rate is 66) heard  Pulmonary:      Effort: Pulmonary effort is normal       Breath sounds: Normal breath sounds  Abdominal:      General: Abdomen is flat  There is no distension  Palpations: Abdomen is soft  There is no mass  Tenderness: There is no abdominal tenderness  Musculoskeletal:      Cervical back: Neck supple  Right lower leg: No edema  Left lower leg: No edema  Lymphadenopathy:      Cervical: No cervical adenopathy  Skin:     General: Skin is warm and dry  Findings: No rash  Neurological:      Mental Status: She is alert  Motor: No weakness        Coordination: Coordination normal       Gait: Gait normal       Deep Tendon Reflexes: Reflexes abnormal ( reflexes 1+)     Psychiatric:         Mood and Affect: Mood normal

## 2022-05-06 ENCOUNTER — OFFICE VISIT (OUTPATIENT)
Dept: FAMILY MEDICINE CLINIC | Facility: CLINIC | Age: 67
End: 2022-05-06
Payer: MEDICARE

## 2022-05-06 VITALS
WEIGHT: 169 LBS | SYSTOLIC BLOOD PRESSURE: 142 MMHG | BODY MASS INDEX: 29.95 KG/M2 | HEIGHT: 63 IN | DIASTOLIC BLOOD PRESSURE: 72 MMHG
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
## 2022-05-06 DIAGNOSIS — S70.362A TICK BITE OF LEFT THIGH, INITIAL ENCOUNTER: Primary | ICD-10-CM

## 2022-05-06 PROCEDURE — 99213 OFFICE O/P EST LOW 20 MIN: CPT | Performed by: FAMILY MEDICINE

## 2022-05-06 RX ORDER — DOXYCYCLINE HYCLATE 100 MG/1
100 CAPSULE ORAL EVERY 12 HOURS SCHEDULED
Qty: 20 CAPSULE | Refills: 0 | Status: SHIPPED | OUTPATIENT
Start: 2022-05-06 | End: 2022-05-16

## 2022-05-06 NOTE — PATIENT INSTRUCTIONS
Discussed the problem with the tick bite will place on 10 day course of doxycycline 100 mg twice a day as prophylaxis    If does developed problems with fever headache and muscle ache should call

## 2022-05-06 NOTE — ASSESSMENT & PLAN NOTE
Discussed problem  This apparently was a deer tick and will place on ten days course of doxycycline 100 mg stress a day    It develops problems with headache muscle aches and fever should call

## 2022-05-06 NOTE — PROGRESS NOTES
Assessment/Plan:    Tick bite of left thigh  Discussed problem  This apparently was a deer tick and will place on ten days course of doxycycline 100 mg stress a day  It develops problems with headache muscle aches and fever should call       Diagnoses and all orders for this visit:    Tick bite of left thigh, initial encounter  -     doxycycline hyclate (VIBRAMYCIN) 100 mg capsule; Take 1 capsule (100 mg total) by mouth every 12 (twelve) hours for 10 days          Subjective:      Patient ID: Britany Enriquez is a 73 y o  female  Patient has history of hypertension, reflux esophagitis, allergic rhinitis, urinary incontinence, stress disorder and hypercholesterolemia  Seen today for tick bite in the left upper thigh  She removed the tick yesterday and described it as being very small as a deer tick      The following portions of the patient's history were reviewed and updated as appropriate: allergies, current medications, past medical history, past social history and problem list     Review of Systems   Constitutional: Negative for fever  Musculoskeletal: Negative for myalgias  Skin: Positive for wound (Irritated area in the left upper thigh at the tick bite)  Negative for rash  Neurological: Negative for headaches  Hematological: Negative for adenopathy  Objective:      /72 (BP Location: Left arm, Patient Position: Sitting, Cuff Size: Standard)   Ht 5' 3" (1 6 m)   Wt 76 7 kg (169 lb)   BMI 29 94 kg/m²          Physical Exam  Vitals and nursing note reviewed  Constitutional:       Appearance: She is well-developed  HENT:      Head: Normocephalic  Eyes:      Conjunctiva/sclera: Conjunctivae normal    Neck:      Thyroid: No thyromegaly  Cardiovascular:      Rate and Rhythm: Normal rate and regular rhythm  Heart sounds: Normal heart sounds  No murmur (Rate is 66) heard  Pulmonary:      Effort: Pulmonary effort is normal       Breath sounds: Normal breath sounds  Abdominal:      General: Abdomen is flat  There is no distension  Palpations: Abdomen is soft  There is no mass  Tenderness: There is no abdominal tenderness  Musculoskeletal:      Cervical back: Neck supple  No tenderness  Right lower leg: No edema  Left lower leg: No edema  Lymphadenopathy:      Cervical: No cervical adenopathy  Skin:     General: Skin is warm and dry  Findings: No rash ( 5 mm pinkish red area at the site of the tick bite)  Neurological:      Mental Status: She is alert  Motor: No weakness        Coordination: Coordination normal       Gait: Gait normal    Psychiatric:         Mood and Affect: Mood normal

## 2022-07-25 ENCOUNTER — OFFICE VISIT (OUTPATIENT)
Dept: FAMILY MEDICINE CLINIC | Facility: CLINIC | Age: 67
End: 2022-07-25
Payer: MEDICARE

## 2022-07-25 VITALS
HEART RATE: 68 BPM | OXYGEN SATURATION: 97 % | BODY MASS INDEX: 29.77 KG/M2 | HEIGHT: 63 IN | SYSTOLIC BLOOD PRESSURE: 122 MMHG | WEIGHT: 168 LBS | DIASTOLIC BLOOD PRESSURE: 72 MMHG

## 2022-07-25 DIAGNOSIS — M25.561 ACUTE PAIN OF BOTH KNEES: Primary | ICD-10-CM

## 2022-07-25 DIAGNOSIS — M25.562 ACUTE PAIN OF BOTH KNEES: Primary | ICD-10-CM

## 2022-07-25 PROCEDURE — 99213 OFFICE O/P EST LOW 20 MIN: CPT | Performed by: FAMILY MEDICINE

## 2022-07-25 RX ORDER — MELOXICAM 15 MG/1
15 TABLET ORAL DAILY
Qty: 20 TABLET | Refills: 1 | Status: SHIPPED | OUTPATIENT
Start: 2022-07-25 | End: 2022-08-02 | Stop reason: SDUPTHER

## 2022-07-25 NOTE — PATIENT INSTRUCTIONS
Discussed the problem with the knee pain  Will do Lyme screen although I do not feel that that is the cause  Will start on meloxicam 15 mg daily to be taken with food    Should sleep with a pillow between the knees and will re-evaluate in 1 week

## 2022-07-25 NOTE — PROGRESS NOTES
Assessment/Plan:    Acute pain of both knees  I feel this probably relates to physical activity and not Lyme but will do Lyme screen  Should use pillow between the knees at night when sleeping and will place on 15 mg of meloxicam daily be taken with food  Will re-evaluate in 1 week       Diagnoses and all orders for this visit:    Acute pain of both knees  -     Lyme Antibody Profile with reflex to WB; Future  -     meloxicam (Mobic) 15 mg tablet; Take 1 tablet (15 mg total) by mouth daily          Subjective:      Patient ID: Miroslava You is a 78 y o  female  Patient has history of hypertension, reflux esophagitis, allergic rhinitis, urinary incontinence, stress disorder and hypercholesterolemia  Seen today for problems with bilateral knee pain over the last 3-4 weeks  Is concerned this could relate to Lyme  Note did have tick bite in May but was given 10 day course doxycycline  Has not taken anything for the knee pain and does not recall any injury      The following portions of the patient's history were reviewed and updated as appropriate: allergies, current medications, past medical history, past social history and problem list     Review of Systems   Constitutional: Negative for fever  Gastrointestinal: Negative for abdominal pain  Musculoskeletal: Positive for arthralgias (Bilateral knee pain)  Negative for gait problem and myalgias  Skin: Negative for color change and rash  Neurological: Negative for headaches  Psychiatric/Behavioral: Positive for sleep disturbance ( because the knees are painful with touching)  Objective:      /72 (BP Location: Left arm, Patient Position: Sitting, Cuff Size: Large)   Pulse 68   Ht 5' 3" (1 6 m)   Wt 76 2 kg (168 lb)   SpO2 97%   BMI 29 76 kg/m²          Physical Exam  Vitals and nursing note reviewed  Constitutional:       Appearance: Normal appearance  She is well-developed  HENT:      Head: Normocephalic        Nose: Nose normal  Eyes:      Conjunctiva/sclera: Conjunctivae normal    Neck:      Thyroid: No thyromegaly  Cardiovascular:      Rate and Rhythm: Normal rate and regular rhythm  Heart sounds: Normal heart sounds  No murmur (Rate is 72) heard  Pulmonary:      Effort: Pulmonary effort is normal       Breath sounds: Normal breath sounds  Abdominal:      General: Abdomen is flat  There is no distension  Palpations: Abdomen is soft  There is no mass  Tenderness: There is no abdominal tenderness  Musculoskeletal:         General: No swelling  Cervical back: Neck supple  Right lower leg: No edema  Left lower leg: No edema  Comments: Bilateral tenderness of the anserine ligaments and mild collateral ligament tenderness otherwise normal knee exams   Lymphadenopathy:      Cervical: No cervical adenopathy  Skin:     General: Skin is warm and dry  Findings: No rash  Neurological:      Mental Status: She is alert  Motor: No weakness        Coordination: Coordination normal       Gait: Gait normal    Psychiatric:         Mood and Affect: Mood normal

## 2022-07-25 NOTE — ASSESSMENT & PLAN NOTE
I feel this probably relates to physical activity and not Lyme but will do Lyme screen  Should use pillow between the knees at night when sleeping and will place on 15 mg of meloxicam daily be taken with food    Will re-evaluate in 1 week

## 2022-07-26 ENCOUNTER — LAB (OUTPATIENT)
Dept: LAB | Facility: CLINIC | Age: 67
End: 2022-07-26
Payer: MEDICARE

## 2022-07-26 DIAGNOSIS — M25.562 ACUTE PAIN OF BOTH KNEES: ICD-10-CM

## 2022-07-26 DIAGNOSIS — M25.561 ACUTE PAIN OF BOTH KNEES: ICD-10-CM

## 2022-07-26 PROCEDURE — 86618 LYME DISEASE ANTIBODY: CPT

## 2022-07-26 PROCEDURE — 86617 LYME DISEASE ANTIBODY: CPT

## 2022-07-26 PROCEDURE — 36415 COLL VENOUS BLD VENIPUNCTURE: CPT

## 2022-07-27 DIAGNOSIS — M25.561 ACUTE PAIN OF BOTH KNEES: Primary | ICD-10-CM

## 2022-07-27 DIAGNOSIS — M25.562 ACUTE PAIN OF BOTH KNEES: Primary | ICD-10-CM

## 2022-07-27 LAB — B BURGDOR IGG+IGM SER-ACNC: >8 AI

## 2022-07-27 RX ORDER — DOXYCYCLINE HYCLATE 100 MG/1
100 CAPSULE ORAL EVERY 12 HOURS SCHEDULED
Qty: 20 CAPSULE | Refills: 0 | Status: SHIPPED | OUTPATIENT
Start: 2022-07-27 | End: 2022-08-06

## 2022-07-27 NOTE — RESULT ENCOUNTER NOTE
Please call the patient regarding her abnormal result  Initial screen looks positive but I would need to see the 2nd part of the Lyme screen to be sure    In any event I feel should start on doxycycline 100 mg twice a day to be taken with food and I did send this in

## 2022-07-28 ENCOUNTER — TELEPHONE (OUTPATIENT)
Dept: FAMILY MEDICINE CLINIC | Facility: CLINIC | Age: 67
End: 2022-07-28

## 2022-07-28 ENCOUNTER — TELEPHONE (OUTPATIENT)
Dept: OTHER | Facility: OTHER | Age: 67
End: 2022-07-28

## 2022-07-28 LAB
B BURGDOR IGG PATRN SER IB-IMP: NEGATIVE
B BURGDOR IGM PATRN SER IB-IMP: NEGATIVE
B BURGDOR18KD IGG SER QL IB: PRESENT
B BURGDOR23KD IGG SER QL IB: ABNORMAL
B BURGDOR23KD IGM SER QL IB: ABNORMAL
B BURGDOR28KD IGG SER QL IB: ABNORMAL
B BURGDOR30KD IGG SER QL IB: ABNORMAL
B BURGDOR39KD IGG SER QL IB: PRESENT
B BURGDOR39KD IGM SER QL IB: ABNORMAL
B BURGDOR41KD IGG SER QL IB: PRESENT
B BURGDOR41KD IGM SER QL IB: ABNORMAL
B BURGDOR45KD IGG SER QL IB: ABNORMAL
B BURGDOR58KD IGG SER QL IB: PRESENT
B BURGDOR66KD IGG SER QL IB: ABNORMAL
B BURGDOR93KD IGG SER QL IB: ABNORMAL

## 2022-07-28 NOTE — TELEPHONE ENCOUNTER
Patient calling to question meloxicam dosing  Also, patient was prescribed doxycline hyclate yesterday; is that an error?

## 2022-07-28 NOTE — TELEPHONE ENCOUNTER
----- Message from Wily Gomez MD sent at 7/27/2022  2:08 PM EDT -----  Please call the patient regarding her abnormal result  Initial screen looks positive but I would need to see the 2nd part of the Lyme screen to be sure    In any event I feel should start on doxycycline 100 mg twice a day to be taken with food and I did send this in    Left message for patient to call back

## 2022-07-28 NOTE — RESULT ENCOUNTER NOTE
Please call the patient regarding her abnormal result    I got the 2nd port of the report back and I feel the Lyme screen probably represents old infection but I would like her to continue the doxycycline at this time and will discuss at next visit

## 2022-08-02 ENCOUNTER — OFFICE VISIT (OUTPATIENT)
Dept: FAMILY MEDICINE CLINIC | Facility: CLINIC | Age: 67
End: 2022-08-02
Payer: MEDICARE

## 2022-08-02 VITALS
DIASTOLIC BLOOD PRESSURE: 68 MMHG | OXYGEN SATURATION: 96 % | WEIGHT: 165 LBS | BODY MASS INDEX: 29.23 KG/M2 | HEART RATE: 68 BPM | HEIGHT: 63 IN | SYSTOLIC BLOOD PRESSURE: 118 MMHG

## 2022-08-02 DIAGNOSIS — M25.561 ACUTE PAIN OF BOTH KNEES: ICD-10-CM

## 2022-08-02 DIAGNOSIS — M25.562 ACUTE PAIN OF BOTH KNEES: ICD-10-CM

## 2022-08-02 PROCEDURE — 99213 OFFICE O/P EST LOW 20 MIN: CPT | Performed by: FAMILY MEDICINE

## 2022-08-02 RX ORDER — MELOXICAM 15 MG/1
15 TABLET ORAL DAILY
Qty: 10 TABLET | Refills: 0 | Status: SHIPPED | OUTPATIENT
Start: 2022-08-02 | End: 2022-08-08 | Stop reason: SDUPTHER

## 2022-08-02 NOTE — ASSESSMENT & PLAN NOTE
The pain in the right knee has resolved although still has pain in the left knee  I do not feel this relates to Lyme disease  May finish the doxycycline    Will start back on meloxicam for 1 week trial   If this seems to settle the left knee would just continue if not will set up with Orthopedics for follow-up

## 2022-08-02 NOTE — PROGRESS NOTES
Assessment/Plan:    Acute pain of both knees  The pain in the right knee has resolved although still has pain in the left knee  I do not feel this relates to Lyme disease  May finish the doxycycline  Will start back on meloxicam for 1 week trial   If this seems to settle the left knee would just continue if not will set up with Orthopedics for follow-up       Diagnoses and all orders for this visit:    Acute pain of both knees  -     meloxicam (Mobic) 15 mg tablet; Take 1 tablet (15 mg total) by mouth daily for 10 days          Subjective:      Patient ID: Junior Buenrostro is a 78 y o  female  Patient has history of hypertension, reflux esophagitis, allergic rhinitis, urinary incontinence, stress disorder and hypercholesterolemia  Was seen Seen today for problems with bilateral knee pain over the last 3-4 weeks  Is concerned this could relate to Lyme  Note did have tick bite in May but was given 10 day course doxycycline  Was placed on meloxicam and Lyme screen was done which showed for IgG markers but no IgM  Was started on 10 day course of doxycycline and has a couple days left  The right knee is better but the left knee is not improved  When starting the doxycycline had stop the meloxicam as was confused about directions      The following portions of the patient's history were reviewed and updated as appropriate: allergies, current medications, past medical history, past social history and problem list     Review of Systems   Constitutional: Negative for fatigue and fever  Gastrointestinal: Negative for abdominal pain  Musculoskeletal: Positive for arthralgias (Left knee pain)  Negative for gait problem and myalgias  Skin: Negative for color change and rash  Neurological: Negative for headaches  Hematological: Negative for adenopathy           Objective:      /68 (BP Location: Left arm, Patient Position: Sitting, Cuff Size: Standard)   Pulse 68   Ht 5' 3" (1 6 m)   Wt 74 8 kg (165 lb) SpO2 96%   BMI 29 23 kg/m²          Physical Exam  Vitals and nursing note reviewed  Constitutional:       Appearance: Normal appearance  She is well-developed  HENT:      Head: Normocephalic  Nose: Nose normal    Eyes:      Conjunctiva/sclera: Conjunctivae normal    Neck:      Thyroid: No thyromegaly  Cardiovascular:      Rate and Rhythm: Normal rate and regular rhythm  Heart sounds: Normal heart sounds  No murmur (Rate is 72) heard  Pulmonary:      Effort: Pulmonary effort is normal       Breath sounds: Normal breath sounds  Abdominal:      General: Abdomen is flat  There is no distension  Palpations: Abdomen is soft  There is no mass  Tenderness: There is no abdominal tenderness  Musculoskeletal:      Cervical back: Neck supple  Right lower leg: No edema  Left lower leg: No edema  Legs:    Lymphadenopathy:      Cervical: No cervical adenopathy  Skin:     General: Skin is warm and dry  Findings: No rash  Neurological:      Mental Status: She is alert  Motor: No weakness        Coordination: Coordination normal       Gait: Gait normal       Deep Tendon Reflexes: Reflexes normal    Psychiatric:         Mood and Affect: Mood normal

## 2022-08-02 NOTE — PATIENT INSTRUCTIONS
Discussed the problem with the positive Lyme screen  I feel this probably represents old infection  Would just finish out the doxycycline but would not continue with this time    I feel does have degenerative arthritis and will place on 1 week trial of the meloxicam 15 mg daily if this seems to control the problem would just continue this if not will set up with Orthopedics for follow-up for problems with the left knee

## 2022-08-08 ENCOUNTER — TELEPHONE (OUTPATIENT)
Dept: FAMILY MEDICINE CLINIC | Facility: CLINIC | Age: 67
End: 2022-08-08

## 2022-08-08 DIAGNOSIS — M25.561 ACUTE PAIN OF BOTH KNEES: ICD-10-CM

## 2022-08-08 DIAGNOSIS — M25.562 ACUTE PAIN OF BOTH KNEES: ICD-10-CM

## 2022-08-08 RX ORDER — MELOXICAM 15 MG/1
15 TABLET ORAL DAILY
Qty: 30 TABLET | Refills: 5 | Status: SHIPPED | OUTPATIENT
Start: 2022-08-08 | End: 2022-08-23 | Stop reason: SDUPTHER

## 2022-08-08 NOTE — TELEPHONE ENCOUNTER
Patient wanted to let you know that the medication you prescribed her is working and she is asking for a refill  If appropriate please send to pharmacy  Never

## 2022-08-23 DIAGNOSIS — M25.561 ACUTE PAIN OF BOTH KNEES: ICD-10-CM

## 2022-08-23 DIAGNOSIS — M25.562 ACUTE PAIN OF BOTH KNEES: ICD-10-CM

## 2022-08-23 RX ORDER — MELOXICAM 15 MG/1
15 TABLET ORAL DAILY
Qty: 90 TABLET | Refills: 1 | Status: SHIPPED | OUTPATIENT
Start: 2022-08-23

## 2022-09-07 ENCOUNTER — OFFICE VISIT (OUTPATIENT)
Dept: FAMILY MEDICINE CLINIC | Facility: CLINIC | Age: 67
End: 2022-09-07
Payer: MEDICARE

## 2022-09-07 VITALS
OXYGEN SATURATION: 96 % | HEIGHT: 63 IN | WEIGHT: 168 LBS | HEART RATE: 73 BPM | BODY MASS INDEX: 29.77 KG/M2 | SYSTOLIC BLOOD PRESSURE: 116 MMHG | DIASTOLIC BLOOD PRESSURE: 66 MMHG

## 2022-09-07 DIAGNOSIS — K21.00 GASTROESOPHAGEAL REFLUX DISEASE WITH ESOPHAGITIS WITHOUT HEMORRHAGE: Chronic | ICD-10-CM

## 2022-09-07 DIAGNOSIS — I10 ESSENTIAL HYPERTENSION: Primary | Chronic | ICD-10-CM

## 2022-09-07 DIAGNOSIS — N39.3 STRESS INCONTINENCE OF URINE: Chronic | ICD-10-CM

## 2022-09-07 DIAGNOSIS — F33.42 RECURRENT MAJOR DEPRESSIVE DISORDER, IN FULL REMISSION (HCC): ICD-10-CM

## 2022-09-07 DIAGNOSIS — F41.1 GENERALIZED ANXIETY DISORDER: Chronic | ICD-10-CM
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
## 2022-09-07 PROBLEM — M25.562 ACUTE PAIN OF BOTH KNEES: Status: RESOLVED | Noted: 2022-07-25 | Resolved: 2022-09-07

## 2022-09-07 PROBLEM — M25.561 ACUTE PAIN OF BOTH KNEES: Status: RESOLVED | Noted: 2022-07-25 | Resolved: 2022-09-07

## 2022-09-07 PROBLEM — S70.362A TICK BITE OF LEFT THIGH: Status: RESOLVED | Noted: 2022-05-06 | Resolved: 2022-09-07

## 2022-09-07 PROCEDURE — 99214 OFFICE O/P EST MOD 30 MIN: CPT | Performed by: FAMILY MEDICINE

## 2022-09-07 RX ORDER — CITALOPRAM 20 MG/1
20 TABLET ORAL DAILY
Qty: 90 TABLET | Refills: 1 | Status: SHIPPED | OUTPATIENT
Start: 2022-09-07

## 2022-09-07 NOTE — PATIENT INSTRUCTIONS
Overall seems to be doing well  Continue to watch diet and push daily walking activity  Will continue on all meds as is    Should get flu shot in the fall

## 2022-09-07 NOTE — PROGRESS NOTES
Assessment/Plan:    Essential hypertension  Overall stable, continue current regimen    Gastro-esophageal reflux disease with esophagitis  Asymptomatic, continue current regimen    Generalized anxiety disorder  I feel doing well, continue current regimen    Stress incontinence of urine  Doing well, continue the Kegel exercises on a regular basis       Diagnoses and all orders for this visit:    Essential hypertension    [facility]  -     citalopram (CeleXA) 20 mg tablet; Take 1 tablet (20 mg total) by mouth daily    Gastroesophageal reflux disease with esophagitis without hemorrhage    Generalized anxiety disorder    Stress incontinence of urine          Subjective:      Patient ID: Linsey Wynn is a 78 y o  female  Patient has history of hypertension, reflux esophagitis, allergic rhinitis, urinary incontinence, stress disorder and hypercholesterolemia  Overall has been feeling well      The following portions of the patient's history were reviewed and updated as appropriate: allergies, current medications, past medical history, past social history and problem list     Review of Systems   Constitutional: Negative for activity change, appetite change, chills, fatigue, fever and unexpected weight change  HENT: Negative for congestion, rhinorrhea and trouble swallowing  Eyes: Negative for visual disturbance  Respiratory: Negative for apnea, cough, chest tightness and shortness of breath  Cardiovascular: Negative for chest pain, palpitations and leg swelling  Gastrointestinal: Negative for abdominal distention, abdominal pain, constipation and diarrhea  Endocrine: Positive for polyuria (Nocturia times 1-3)  Genitourinary: Negative for enuresis  Musculoskeletal: Positive for arthralgias  Negative for myalgias  Skin: Negative for rash  Allergic/Immunologic: Positive for environmental allergies     Neurological: Negative for dizziness, weakness, light-headedness, numbness and headaches  Hematological: Negative for adenopathy  Psychiatric/Behavioral: Negative for agitation and sleep disturbance  Objective:      /66 (BP Location: Left arm, Patient Position: Sitting, Cuff Size: Large)   Pulse 73   Ht 5' 3" (1 6 m)   Wt 76 2 kg (168 lb)   SpO2 96%   BMI 29 76 kg/m²          Physical Exam  Vitals and nursing note reviewed  Constitutional:       Appearance: Normal appearance  She is well-developed  HENT:      Head: Normocephalic  Nose: Nose normal    Eyes:      Conjunctiva/sclera: Conjunctivae normal    Neck:      Thyroid: No thyromegaly  Vascular: No carotid bruit  Cardiovascular:      Rate and Rhythm: Normal rate and regular rhythm  Heart sounds: Normal heart sounds  No murmur (Rate is 66) heard  Pulmonary:      Effort: Pulmonary effort is normal       Breath sounds: Normal breath sounds  Abdominal:      General: There is no distension  Palpations: Abdomen is soft  There is no mass  Tenderness: There is no abdominal tenderness  Musculoskeletal:      Cervical back: Neck supple  No tenderness  Right lower leg: No edema  Left lower leg: No edema  Lymphadenopathy:      Cervical: No cervical adenopathy  Skin:     General: Skin is warm and dry  Findings: No rash  Neurological:      Mental Status: She is alert  Motor: No weakness        Coordination: Coordination normal       Gait: Gait normal       Deep Tendon Reflexes: Reflexes normal    Psychiatric:         Mood and Affect: Mood normal

## 2022-10-07 ENCOUNTER — OFFICE VISIT (OUTPATIENT)
Dept: FAMILY MEDICINE CLINIC | Facility: CLINIC | Age: 67
End: 2022-10-07
Payer: MEDICARE

## 2022-10-07 VITALS
OXYGEN SATURATION: 96 % | SYSTOLIC BLOOD PRESSURE: 134 MMHG | HEIGHT: 63 IN | WEIGHT: 166 LBS | DIASTOLIC BLOOD PRESSURE: 72 MMHG | BODY MASS INDEX: 29.41 KG/M2 | HEART RATE: 76 BPM

## 2022-10-07 DIAGNOSIS — N30.00 ACUTE CYSTITIS WITHOUT HEMATURIA: ICD-10-CM

## 2022-10-07 DIAGNOSIS — R07.82 INTERCOSTAL PAIN: Primary | ICD-10-CM

## 2022-10-07 PROCEDURE — 99213 OFFICE O/P EST LOW 20 MIN: CPT | Performed by: FAMILY MEDICINE

## 2022-10-07 RX ORDER — CIPROFLOXACIN 250 MG/1
250 TABLET, FILM COATED ORAL EVERY 12 HOURS SCHEDULED
Qty: 14 TABLET | Refills: 0 | Status: SHIPPED | OUTPATIENT
Start: 2022-10-07 | End: 2022-10-14

## 2022-10-07 RX ORDER — CYCLOBENZAPRINE HCL 10 MG
10 TABLET ORAL 3 TIMES DAILY PRN
Qty: 15 TABLET | Refills: 1 | Status: SHIPPED | OUTPATIENT
Start: 2022-10-07

## 2022-10-07 NOTE — PATIENT INSTRUCTIONS
Discussed problem  I do feel has UTI and will place on 1 week course of Cipro 250 mg twice a day  Does have lower chest pain in the muscles between the ribs creating the problem with the back pain and avoid twisting activity  Be sure when standing in front of a [place] sink to put 1 ft in front of the other    May continue the meloxicam and use heating pad and gave prescription for Flexeril which is muscle relaxant to use 3 times a day as needed although it will make her tired

## 2022-10-07 NOTE — ASSESSMENT & PLAN NOTE
Discussed problem  Avoid twisting activity and when standing over a bench wrist sink should always use 1 ft in front of the other and bend with 1 ft in front of the other  May continue the meloxicam and should use heating pad for 20 minutes at a time    Gave Flexeril 10 mg to use up to 3 times a day although this will probably make her tired

## 2022-12-06 PROBLEM — N30.00 ACUTE CYSTITIS WITHOUT HEMATURIA: Status: RESOLVED | Noted: 2022-10-07 | Resolved: 2022-12-06

## 2023-01-10 ENCOUNTER — OFFICE VISIT (OUTPATIENT)
Dept: FAMILY MEDICINE CLINIC | Facility: CLINIC | Age: 68
End: 2023-01-10

## 2023-01-10 VITALS
WEIGHT: 170 LBS | OXYGEN SATURATION: 95 % | DIASTOLIC BLOOD PRESSURE: 74 MMHG | HEIGHT: 63 IN | SYSTOLIC BLOOD PRESSURE: 124 MMHG | HEART RATE: 75 BPM | BODY MASS INDEX: 30.12 KG/M2

## 2023-01-10 DIAGNOSIS — K21.00 GASTROESOPHAGEAL REFLUX DISEASE WITH ESOPHAGITIS WITHOUT HEMORRHAGE: Chronic | ICD-10-CM

## 2023-01-10 DIAGNOSIS — N39.3 STRESS INCONTINENCE OF URINE: Chronic | ICD-10-CM

## 2023-01-10 DIAGNOSIS — F41.1 GENERALIZED ANXIETY DISORDER: Chronic | ICD-10-CM

## 2023-01-10 DIAGNOSIS — I10 ESSENTIAL HYPERTENSION: Primary | ICD-10-CM

## 2023-01-10 DIAGNOSIS — F33.42 RECURRENT MAJOR DEPRESSIVE DISORDER, IN FULL REMISSION (HCC): ICD-10-CM

## 2023-01-10 DIAGNOSIS — G89.29 CHRONIC PAIN OF BOTH KNEES: ICD-10-CM

## 2023-01-10 DIAGNOSIS — Z00.00 MEDICARE ANNUAL WELLNESS VISIT, SUBSEQUENT: ICD-10-CM

## 2023-01-10 DIAGNOSIS — M25.561 CHRONIC PAIN OF BOTH KNEES: ICD-10-CM

## 2023-01-10 DIAGNOSIS — M25.562 CHRONIC PAIN OF BOTH KNEES: ICD-10-CM

## 2023-01-10 RX ORDER — TRIAMTERENE AND HYDROCHLOROTHIAZIDE 75; 50 MG/1; MG/1
1 TABLET ORAL DAILY
Qty: 90 TABLET | Refills: 3 | Status: SHIPPED | OUTPATIENT
Start: 2023-01-10

## 2023-01-10 RX ORDER — CITALOPRAM 20 MG/1
20 TABLET ORAL DAILY
Qty: 90 TABLET | Refills: 1 | Status: SHIPPED | OUTPATIENT
Start: 2023-01-10

## 2023-01-10 RX ORDER — MELOXICAM 15 MG/1
15 TABLET ORAL DAILY
Qty: 90 TABLET | Refills: 1 | Status: SHIPPED | OUTPATIENT
Start: 2023-01-10

## 2023-01-10 NOTE — PATIENT INSTRUCTIONS
Medicare Preventive Visit Patient Instructions  Thank you for completing your Welcome to Medicare Visit or Medicare Annual Wellness Visit today  Your next wellness visit will be due in one year (1/11/2024)  The screening/preventive services that you may require over the next 5-10 years are detailed below  Some tests may not apply to you based off risk factors and/or age  Screening tests ordered at today's visit but not completed yet may show as past due  Also, please note that scanned in results may not display below  Preventive Screenings:  Service Recommendations Previous Testing/Comments   Colorectal Cancer Screening  * Colonoscopy    * Fecal Occult Blood Test (FOBT)/Fecal Immunochemical Test (FIT)  * Fecal DNA/Cologuard Test  * Flexible Sigmoidoscopy Age: 39-70 years old   Colonoscopy: every 10 years (may be performed more frequently if at higher risk)  OR  FOBT/FIT: every 1 year  OR  Cologuard: every 3 years  OR  Sigmoidoscopy: every 5 years  Screening may be recommended earlier than age 39 if at higher risk for colorectal cancer  Also, an individualized decision between you and your healthcare provider will decide whether screening between the ages of 74-80 would be appropriate  Colonoscopy: 08/14/2020  FOBT/FIT: Not on file  Cologuard: Not on file  Sigmoidoscopy: Not on file    Screening Current     Breast Cancer Screening Age: 36 years old  Frequency: every 1-2 years  Not required if history of left and right mastectomy Mammogram: 01/18/2022    Screening Current   Cervical Cancer Screening Between the ages of 21-29, pap smear recommended once every 3 years  Between the ages of 33-67, can perform pap smear with HPV co-testing every 5 years     Recommendations may differ for women with a history of total hysterectomy, cervical cancer, or abnormal pap smears in past  Pap Smear: 01/16/2020    Screening Not Indicated   Hepatitis C Screening Once for adults born between 1945 and 1965  More frequently in patients at high risk for Hepatitis C Hep C Antibody: Not on file    Screening Current   Diabetes Screening 1-2 times per year if you're at risk for diabetes or have pre-diabetes Fasting glucose: 87 mg/dL (1/14/2022)  A1C: No results in last 5 years (No results in last 5 years)  Screening Current  Due for Blood Glucose   Cholesterol Screening Once every 5 years if you don't have a lipid disorder  May order more often based on risk factors  Lipid panel: 01/14/2022    Screening Not Indicated  History Lipid Disorder  Due for Lipid Panel     Other Preventive Screenings Covered by Medicare:  Abdominal Aortic Aneurysm (AAA) Screening: covered once if your at risk  You're considered to be at risk if you have a family history of AAA  Lung Cancer Screening: covers low dose CT scan once per year if you meet all of the following conditions: (1) Age 50-69; (2) No signs or symptoms of lung cancer; (3) Current smoker or have quit smoking within the last 15 years; (4) You have a tobacco smoking history of at least 20 pack years (packs per day multiplied by number of years you smoked); (5) You get a written order from a healthcare provider  Glaucoma Screening: covered annually if you're considered high risk: (1) You have diabetes OR (2) Family history of glaucoma OR (3)  aged 48 and older OR (3)  American aged 72 and older  Osteoporosis Screening: covered every 2 years if you meet one of the following conditions: (1) You're estrogen deficient and at risk for osteoporosis based off medical history and other findings; (2) Have a vertebral abnormality; (3) On glucocorticoid therapy for more than 3 months; (4) Have primary hyperparathyroidism; (5) On osteoporosis medications and need to assess response to drug therapy  Last bone density test (DXA Scan): 02/10/2022  HIV Screening: covered annually if you're between the age of 12-76   Also covered annually if you are younger than 13 and older than 72 with risk factors for HIV infection  For pregnant patients, it is covered up to 3 times per pregnancy  Immunizations:  Immunization Recommendations   Influenza Vaccine Annual influenza vaccination during flu season is recommended for all persons aged >= 6 months who do not have contraindications   Pneumococcal Vaccine   * Pneumococcal conjugate vaccine = PCV13 (Prevnar 13), PCV15 (Vaxneuvance), PCV20 (Prevnar 20)  * Pneumococcal polysaccharide vaccine = PPSV23 (Pneumovax) Adults 25-60 years old: 1-3 doses may be recommended based on certain risk factors  Adults 72 years old: 1-2 doses may be recommended based off what pneumonia vaccine you previously received   Hepatitis B Vaccine 3 dose series if at intermediate or high risk (ex: diabetes, end stage renal disease, liver disease)   Tetanus (Td) Vaccine - COST NOT COVERED BY MEDICARE PART B Following completion of primary series, a booster dose should be given every 10 years to maintain immunity against tetanus  Td may also be given as tetanus wound prophylaxis  Tdap Vaccine - COST NOT COVERED BY MEDICARE PART B Recommended at least once for all adults  For pregnant patients, recommended with each pregnancy  Shingles Vaccine (Shingrix) - COST NOT COVERED BY MEDICARE PART B  2 shot series recommended in those aged 48 and above     Health Maintenance Due:      Topic Date Due    Hepatitis C Screening  Never done    Lung Cancer Screening  Never done    Breast Cancer Screening: Mammogram  01/18/2023    DXA SCAN  02/10/2024    Colorectal Cancer Screening  08/14/2024     Immunizations Due:      Topic Date Due    Hepatitis B Vaccine (1 of 3 - 3-dose series) Never done    COVID-19 Vaccine (4 - Booster for Moderna series) 12/23/2021    Pneumococcal Vaccine: 65+ Years (2 - PCV) 09/14/2022     Advance Directives   What are advance directives? Advance directives are legal documents that state your wishes and plans for medical care   These plans are made ahead of time in case you lose your ability to make decisions for yourself  Advance directives can apply to any medical decision, such as the treatments you want, and if you want to donate organs  What are the types of advance directives? There are many types of advance directives, and each state has rules about how to use them  You may choose a combination of any of the following:  Living will: This is a written record of the treatment you want  You can also choose which treatments you do not want, which to limit, and which to stop at a certain time  This includes surgery, medicine, IV fluid, and tube feedings  Durable power of  for healthcare [facility]): This is a written record that states who you want to make healthcare choices for you when you are unable to make them for yourself  This person, called a proxy, is usually a family member or a friend  You may choose more than 1 proxy  Do not resuscitate (DNR) order:  A DNR order is used in case your heart stops beating or you stop breathing  It is a request not to have certain forms of treatment, such as CPR  A DNR order may be included in other types of advance directives  Medical directive: This covers the care that you want if you are in a coma, near death, or unable to make decisions for yourself  You can list the treatments you want for each condition  Treatment may include pain medicine, surgery, blood transfusions, dialysis, IV or tube feedings, and a ventilator (breathing machine)  Values history: This document has questions about your views, beliefs, and how you feel and think about life  This information can help others choose the care that you would choose  Why are advance directives important? An advance directive helps you control your care  Although spoken wishes may be used, it is better to have your wishes written down  Spoken wishes can be misunderstood, or not followed  Treatments may be given even if you do not want them   An advance directive may make it easier for your family to make difficult choices about your care  Urinary Incontinence   Urinary incontinence (UI)  is when you lose control of your bladder  UI develops because your bladder cannot store or empty urine properly  The 3 most common types of UI are stress incontinence, urge incontinence, or both  Medicines:   May be given to help strengthen your bladder control  Report any side effects of medication to your healthcare provider  Do pelvic muscle exercises often:  Your pelvic muscles help you stop urinating  Squeeze these muscles tight for 5 seconds, then relax for 5 seconds  Gradually work up to squeezing for 10 seconds  Do 3 sets of 15 repetitions a day, or as directed  This will help strengthen your pelvic muscles and improve bladder control  Train your bladder:  Go to the bathroom at set times, such as every 2 hours, even if you do not feel the urge to go  You can also try to hold your urine when you feel the urge to go  For example, hold your urine for 5 minutes when you feel the urge to go  As that becomes easier, hold your urine for 10 minutes  Self-care:   Keep a UI record  Write down how often you leak urine and how much you leak  Make a note of what you were doing when you leaked urine  Drink liquids as directed  You may need to limit the amount of liquid you drink to help control your urine leakage  Do not drink any liquid right before you go to bed  Limit or do not have drinks that contain caffeine or alcohol  Prevent constipation  Eat a variety of high-fiber foods  Good examples are high-fiber cereals, beans, vegetables, and whole-grain breads  Walking is the best way to trigger your intestines to have a bowel movement  Exercise regularly and maintain a healthy weight  Weight loss and exercise will decrease pressure on your bladder and help you control your leakage  Use a catheter as directed  to help empty your bladder   A catheter is a tiny, plastic tube that is put into your bladder to drain your urine  Go to behavior therapy as directed  Behavior therapy may be used to help you learn to control your urge to urinate  Cigarette Smoking and Your Health   Risks to your health if you smoke:  Nicotine and other chemicals found in tobacco damage every cell in your body  Even if you are a light smoker, you have an increased risk for cancer, heart disease, and lung disease  If you are pregnant or have diabetes, smoking increases your risk for complications  Benefits to your health if you stop smoking: You decrease respiratory symptoms such as coughing, wheezing, and shortness of breath  You reduce your risk for cancers of the lung, mouth, throat, kidney, bladder, pancreas, stomach, and cervix  If you already have cancer, you increase the benefits of chemotherapy  You also reduce your risk for cancer returning or a second cancer from developing  You reduce your risk for heart disease, blood clots, heart attack, and stroke  You reduce your risk for lung infections, and diseases such as pneumonia, asthma, chronic bronchitis, and emphysema  Your circulation improves  More oxygen can be delivered to your body  If you have diabetes, you lower your risk for complications, such as kidney, artery, and eye diseases  You also lower your risk for nerve damage  Nerve damage can lead to amputations, poor vision, and blindness  You improve your body's ability to heal and to fight infections  For more information and support to stop smoking:   [employer]  Phone: 2- 292 - 778-4930  Web Address: www [employer]  Weight Management   Why it is important to manage your weight:  Being overweight increases your risk of health conditions such as heart disease, high blood pressure, type 2 diabetes, and certain types of cancer  It can also increase your risk for osteoarthritis, sleep apnea, and other respiratory problems  Aim for a slow, steady weight loss   Even a small amount of weight loss can lower your risk of health problems  How to lose weight safely:  A safe and healthy way to lose weight is to eat fewer calories and get regular exercise  You can lose up about 1 pound a week by decreasing the number of calories you eat by 500 calories each day  Healthy meal plan for weight management:  A healthy meal plan includes a variety of foods, contains fewer calories, and helps you stay healthy  A healthy meal plan includes the following:  Eat whole-grain foods more often  A healthy meal plan should contain fiber  Fiber is the part of grains, fruits, and vegetables that is not broken down by your body  Whole-grain foods are healthy and provide extra fiber in your diet  Some examples of whole-grain foods are whole-wheat breads and pastas, oatmeal, brown rice, and bulgur  Eat a variety of vegetables every day  Include dark, leafy greens such as spinach, kale, ameya greens, and mustard greens  Eat yellow and orange vegetables such as carrots, sweet potatoes, and winter squash  Eat a variety of fruits every day  Choose fresh or canned fruit (canned in its own juice or light syrup) instead of juice  Fruit juice has very little or no fiber  Eat low-fat dairy foods  Drink fat-free (skim) milk or 1% milk  Eat fat-free yogurt and low-fat cottage cheese  Try low-fat cheeses such as mozzarella and other reduced-fat cheeses  Choose meat and other protein foods that are low in fat  Choose beans or other legumes such as split peas or lentils  Choose fish, skinless poultry (chicken or turkey), or lean cuts of red meat (beef or pork)  Before you cook meat or poultry, cut off any visible fat  Use less fat and oil  Try baking foods instead of frying them  Add less fat, such as margarine, sour cream, regular salad dressing and mayonnaise to foods  Eat fewer high-fat foods  Some examples of high-fat foods include french fries, doughnuts, ice cream, and cakes  Eat fewer sweets    Limit foods and drinks that are high in sugar  This includes candy, cookies, regular soda, and sweetened drinks  Exercise:  Exercise at least 30 minutes per day on most days of the week  Some examples of exercise include walking, biking, dancing, and swimming  You can also fit in more physical activity by taking the stairs instead of the elevator or parking farther away from stores  Ask your healthcare provider about the best exercise plan for you  © Copyright [employer] 2018 Information is for End User's use only and may not be sold, redistributed or otherwise used for commercial purposes  All illustrations and images included in CareNotes® are the copyrighted property of [employer]  or [address] [place] [address]  Overall patient is very functional   I did advise carbon monoxide monitor in the house  Should bring in the living will the next time to have scanned into the chart  Gave refill for medications    Try to get some daily walking activity and watch the starch in the diet

## 2023-01-10 NOTE — PROGRESS NOTES
Assessment and Plan:     Problem List Items Addressed This Visit        Digestive    Gastro-esophageal reflux disease with esophagitis (Chronic)     Asymptomatic, continue current regimen            Cardiovascular and Mediastinum    Essential hypertension - Primary (Chronic)     Overall stable, continue current regimen         Relevant Medications    triamterene-hydrochlorothiazide (MAXZIDE) 75-50 MG per tablet       Other    Generalized anxiety disorder (Chronic)     Seems to be doing well, continue current regimen         Relevant Medications    citalopram (CeleXA) 20 mg tablet    Stress incontinence of urine (Chronic)     Watch fluid intake at night and needs to do the Kegel exercises on a regular basis        Other Visit Diagnoses     Medicare annual wellness visit, subsequent        Recurrent major depressive disorder, in full remission ([facility] [address] )        Relevant Medications    citalopram (CeleXA) 20 mg tablet    Chronic pain of both knees        Relevant Medications    meloxicam (Mobic) 15 mg tablet        BMI Counseling: Body mass index is 30 11 kg/m²  The BMI is above normal  Nutrition recommendations include moderation in carbohydrate intake  Exercise recommendations include moderate physical activity 150 minutes/week  No pharmacotherapy was ordered  Rationale for BMI follow-up plan is due to patient being overweight or obese  Urinary Incontinence Plan of Care: counseling topics discussed: practice Kegel (pelvic floor strengthening) exercises and limiting fluid intake 3-4 hours before bed  Preventive health issues were discussed with patient, and age appropriate screening tests were ordered as noted in patient's After Visit Summary  Personalized health advice and appropriate referrals for health education or preventive services given if needed, as noted in patient's After Visit Summary       History of Present Illness:     Patient presents for a Medicare Wellness Visit    Patient has history of hypertension, reflux esophagitis, allergic rhinitis, urinary incontinence, stress disorder and hypercholesterolemia  Overall has been doing well     Patient Care Team:  Socorro Rivera MD as PCP - General (Family Medicine)     Review of Systems:     Review of Systems   Constitutional: Negative for activity change, appetite change, chills, fatigue, fever and unexpected weight change  HENT: Negative for congestion, dental problem (Upper dentures), hearing loss, rhinorrhea and trouble swallowing  Eyes: Negative for visual disturbance  Respiratory: Negative for apnea, cough, chest tightness and shortness of breath  Cardiovascular: Negative for chest pain, palpitations and leg swelling  Gastrointestinal: Negative for abdominal distention, abdominal pain, constipation and diarrhea  Endocrine: Positive for polyuria (Nocturia x2)  Genitourinary: Positive for enuresis  Musculoskeletal: Positive for arthralgias  Negative for myalgias  Skin: Negative for rash  Allergic/Immunologic: Positive for environmental allergies  Neurological: Negative for dizziness, weakness, light-headedness, numbness and headaches  Hematological: Negative for adenopathy  Psychiatric/Behavioral: Negative for agitation and sleep disturbance          Problem List:     Patient Active Problem List   Diagnosis   • Essential hypertension   • Hypercholesterolemia   • Seasonal allergic rhinitis due to pollen   • Generalized anxiety disorder   • Stress incontinence of urine   • Gastro-esophageal reflux disease with esophagitis   • Dermatitis   • Osteopenia of multiple sites   • Intercostal pain      Past Medical and Surgical History:     Past Medical History:   Diagnosis Date   • Allergic Young child   • Allergic rhinitis    • Functional disorder of bladder    • GERD (gastroesophageal reflux disease)    • Hypertension    • Otitis media Child   • Urinary tract infection 1970's   • Visual impairment 2019    Age related macular degeneration     Past Surgical History:   Procedure Laterality Date   • HYSTERECTOMY  04/23/2019    total   • TUBAL LIGATION  Not sure      Family History:     Family History   Problem Relation Age of Onset   • Breast cancer Maternal Aunt         approx 79 yrs of age    • No Known Problems Mother    • No Known Problems Father    • No Known Problems Daughter    • No Known Problems Maternal Grandmother    • No Known Problems Maternal Grandfather    • Emphysema Paternal Grandmother    • Arthritis Paternal Grandmother    • No Known Problems Paternal Grandfather    • Breast cancer Paternal Aunt    • No Known Problems Paternal Aunt    • COPD Son       Social History:     Social History     Socioeconomic History   • Marital status:      Spouse name: None   • Number of children: None   • Years of education: None   • Highest education level: None   Occupational History   • None   Tobacco Use   • Smoking status: Every Day     Packs/day: 1 00     Years: 42 00     Pack years: 42 00     Types: Cigarettes   • Smokeless tobacco: Never   Vaping Use   • Vaping Use: Never used   Substance and Sexual Activity   • Alcohol use: Yes     Comment: Occasionally    • Drug use: Never   • Sexual activity: Yes     Partners: Male     Comment: Birth control pill, tubes tied   Other Topics Concern   • None   Social History Narrative   • None     Social Determinants of Health     Financial Resource Strain: Low Risk    • Difficulty of Paying Living Expenses: Not hard at all   Food Insecurity: Not on file   Transportation Needs: No Transportation Needs   • Lack of Transportation (Medical): No   • Lack of Transportation (Non-Medical):  No   Physical Activity: Not on file   Stress: Not on file   Social Connections: Not on file   Intimate Partner Violence: Not on file   Housing Stability: Not on file      Medications and Allergies:     Current Outpatient Medications   Medication Sig Dispense Refill   • CALCIUM CARBONATE-VITAMIN D PO Take 1 tablet by mouth 2 (two) times a day     • citalopram (CeleXA) 20 mg tablet Take 1 tablet (20 mg total) by mouth daily 90 tablet 1   • cyclobenzaprine (FLEXERIL) 10 mg tablet Take 1 tablet (10 mg total) by mouth 3 (three) times a day as needed for muscle spasms 15 tablet 1   • losartan (COZAAR) 100 MG tablet Take 1 tablet (100 mg total) by mouth daily 90 tablet 3   • meloxicam (Mobic) 15 mg tablet Take 1 tablet (15 mg total) by mouth daily 90 tablet 1   • Multiple Vitamins-Minerals (PRESERVISION AREDS 2 PO) Take by mouth     • multivitamin (THERAGRAN) TABS Take 1 tablet by mouth daily     • omeprazole (PriLOSEC OTC) 20 MG tablet Take 20 mg by mouth daily     • psyllium (METAMUCIL) 58 6 % packet Take 1 packet by mouth 3 (three) times a day      • sodium chloride (OCEAN) 0 65 % nasal spray 1 spray into each nostril as needed for congestion     • triamterene-hydrochlorothiazide (MAXZIDE) 75-50 MG per tablet Take 1 tablet by mouth daily 90 tablet 3     No current facility-administered medications for this visit       No Known Allergies   Immunizations:     Immunization History   Administered Date(s) Administered   • COVID-19 MODERNA VACC 0 5 ML IM 03/21/2021, 04/21/2021, 10/28/2021   • INFLUENZA 01/05/2009, 10/01/2009, 11/08/2010, 09/16/2011, 08/29/2013, 09/11/2013, 10/10/2014, 10/02/2015, 09/30/2016, 09/08/2017, 09/07/2018, 09/13/2019, 08/24/2021, 09/08/2022   • Influenza, injectable, quadrivalent, preservative free 0 5 mL 09/07/2018, 09/13/2019, 09/04/2020   • Influenza, seasonal, injectable 01/04/2009, 10/01/2009, 11/07/2010, 09/16/2011, 10/02/2015, 09/30/2016   • Influenza, seasonal, injectable, preservative free 08/29/2013   • Pneumococcal Polysaccharide PPV23 09/14/2021   • Tdap 05/07/2014   • Zoster Vaccine Recombinant 11/26/2018, 03/27/2019      Health Maintenance:         Topic Date Due   • Hepatitis C Screening  Never done   • Lung Cancer Screening  Never done   • Breast Cancer Screening: Mammogram  01/18/2023   • DXA SCAN  02/10/2024   • Colorectal Cancer Screening  08/14/2024         Topic Date Due   • Hepatitis B Vaccine (1 of 3 - 3-dose series) Never done   • COVID-19 Vaccine (4 - Booster for Moderna series) 12/23/2021   • Pneumococcal Vaccine: 65+ Years (2 - PCV) 09/14/2022      Medicare Screening Tests and Risk Assessments:     Chandu Norman is here for her Subsequent Wellness visit  Last Medicare Wellness visit information reviewed, patient interviewed and updates made to the record today  Health Risk Assessment:   Patient rates overall health as good  Patient feels that their physical health rating is same  Patient is satisfied with their life  Eyesight was rated as same  Hearing was rated as same  Patient feels that their emotional and mental health rating is same  Patients states they are never, rarely angry  Patient states they are sometimes unusually tired/fatigued  Pain experienced in the last 7 days has been none  Patient states that she has experienced no weight loss or gain in last 6 months  No issues    Fall Risk Screening: In the past year, patient has experienced: no history of falling in past year      Urinary Incontinence Screening:   Patient has leaked urine accidently in the last six months  Should do her Kegel exercises on a regular basis    Home Safety:  Patient does not have trouble with stairs inside or outside of their home  Patient has working smoke alarms and has no working carbon monoxide detector  Home safety hazards include: none  I did advise carbon monoxide monitor in the house and grab bars in the bathroom    Nutrition:   Current diet is Low Cholesterol and No Added Salt  Watch starch in diet    Medications:   Patient is currently taking over-the-counter supplements  OTC medications include: Vitamens calcium pills metamucil areds preser vision 2 fish oil  Patient is able to manage medications   No issues    Activities of Daily Living (ADLs)/Instrumental Activities of Daily Living (IADLs):   Walk and transfer into and out of bed and chair?: Yes  Dress and groom yourself?: Yes    Bathe or shower yourself?: Yes    Feed yourself? Yes  Do your laundry/housekeeping?: Yes  Manage your money, pay your bills and track your expenses?: Yes  Make your own meals?: Yes    Do your own shopping?: Yes    ADL comments: Overall patient is very functional at home  No issues    Previous Hospitalizations:   Any hospitalizations or ED visits within the last 12 months?: No      Hospitalization Comments: Zero    Advance Care Planning:   Living will: Yes    Durable POA for healthcare: Yes    Advanced directive: Yes    Advanced directive counseling given: Yes    Five wishes given: No    End of Life Decisions reviewed with patient: No      Comments: Should bring this into the office to have scanned into the chart    Cognitive Screening:   Provider or family/friend/caregiver concerned regarding cognition?: No    PREVENTIVE SCREENINGS      Cardiovascular Screening:    General: Screening Not Indicated and History Lipid Disorder    Due for: Lipid Panel      Diabetes Screening:     General: Screening Current    Due for: Blood Glucose      Colorectal Cancer Screening:     General: Screening Current      Breast Cancer Screening:     General: Screening Current      Cervical Cancer Screening:    General: Screening Not Indicated      Osteoporosis Screening:    General: Screening Current      Abdominal Aortic Aneurysm (AAA) Screening:        General: Screening Not Indicated      Lung Cancer Screening:     General: Risks and Benefits Discussed      Hepatitis C Screening:    General: Screening Current      Preventive Screening Comments: Continue follow-up with routine eye care and have reports forwarded to office    Screening, Brief Intervention, and Referral to Treatment (SBIRT)    Screening  Typical number of drinks in a day: 0  Typical number of drinks in a week: 0  Interpretation: Low risk drinking behavior      Single Item Drug Screening:  How often have you used an illegal drug (including marijuana) or a prescription medication for non-medical reasons in the past year? never    Single Item Drug Screen Score: 0  Interpretation: Negative screen for possible drug use disorder    Brief Intervention  Alcohol & drug use screenings were reviewed  No concerns regarding substance use disorder identified  No results found  Physical Exam:     /74 (BP Location: Right arm, Patient Position: Sitting, Cuff Size: Large)   Pulse 75   Ht 5' 3" (1 6 m)   Wt 77 1 kg (170 lb)   SpO2 95%   BMI 30 11 kg/m²     Physical Exam  Vitals and nursing note reviewed  Constitutional:       Appearance: Normal appearance  She is not ill-appearing  HENT:      Head: Normocephalic  Right Ear: Tympanic membrane, ear canal and external ear normal  Decreased hearing (25 dB hearing screen off at 500 Hz and 4000 Hz  No difficulty with conversation) noted  Left Ear: Tympanic membrane, ear canal and external ear normal  Decreased hearing (Any 5 dB hearing screen off except at 2000 Hz) noted  Nose: Nose normal       Mouth/Throat:      Mouth: Mucous membranes are moist       Dentition: Abnormal dentition (Multiple missing lower teeth)  Has dentures (Upper dentures)  Eyes:      Extraocular Movements: Extraocular movements intact  Conjunctiva/sclera: Conjunctivae normal       Pupils: Pupils are equal, round, and reactive to light  Neck:      Vascular: No carotid bruit  Cardiovascular:      Rate and Rhythm: Normal rate and regular rhythm  Pulses:           Dorsalis pedis pulses are 1+ on the right side and 1+ on the left side  Posterior tibial pulses are 1+ on the right side and 1+ on the left side  Heart sounds: Normal heart sounds  No murmur (Rate is 72) heard  Pulmonary:      Effort: Pulmonary effort is normal       Breath sounds: Normal breath sounds  Abdominal:      General: Abdomen is flat   There is no distension  Palpations: Abdomen is soft  There is no mass  Tenderness: There is no abdominal tenderness  Musculoskeletal:         General: No swelling  Cervical back: Normal range of motion and neck supple  No tenderness  Right lower leg: No edema  Left lower leg: No edema  Right foot: No deformity  Left foot: No deformity  Feet:      Right foot:      Protective Sensation: 8 sites tested  8 sites sensed  Skin integrity: Skin integrity normal       Left foot:      Protective Sensation: 8 sites tested  8 sites sensed  Skin integrity: Skin integrity normal    Lymphadenopathy:      Cervical: No cervical adenopathy  Skin:     General: Skin is warm and dry  Capillary Refill: Capillary refill takes 2 to 3 seconds  Findings: No rash  Neurological:      General: No focal deficit present  Mental Status: She is alert and oriented to person, place, and time  Cranial Nerves: No cranial nerve deficit  Sensory: No sensory deficit  Motor: No weakness  Coordination: Coordination normal       Gait: Gait normal       Deep Tendon Reflexes: Reflexes abnormal (Reflexes 1+)  Psychiatric:         Mood and Affect: Mood normal          Behavior: Behavior normal          Thought Content:  Thought content normal          Judgment: Judgment normal           Ashwin Addison MD

## 2023-01-19 ENCOUNTER — OFFICE VISIT (OUTPATIENT)
Dept: FAMILY MEDICINE CLINIC | Facility: CLINIC | Age: 68
End: 2023-01-19

## 2023-01-19 VITALS
BODY MASS INDEX: 30.12 KG/M2 | HEART RATE: 83 BPM | HEIGHT: 63 IN | WEIGHT: 170 LBS | OXYGEN SATURATION: 95 % | SYSTOLIC BLOOD PRESSURE: 120 MMHG | DIASTOLIC BLOOD PRESSURE: 70 MMHG

## 2023-01-19 DIAGNOSIS — J06.9 VIRAL UPPER RESPIRATORY TRACT INFECTION: Primary | ICD-10-CM

## 2023-01-19 NOTE — ASSESSMENT & PLAN NOTE
Discussed problem  Should use saline nasal rinse at bedtime and in the morning and may use Advil Cold and Sinus as needed  Push fluids    If starts to develop a lot of discolored mucus should call

## 2023-01-19 NOTE — PATIENT INSTRUCTIONS
Discussed overall problem  I feel has viral infection and should use saline nasal rinse at bedtime and in the morning  Should also try taking Advil Cold and Sinus which is basically Advil and Sudafed which should help with the overall symptoms    Push fluids if over the next few days starts to get a lot of discolored mucus should call and would add antibiotic

## 2023-01-19 NOTE — PROGRESS NOTES
Name: Albert Falcon      : 1955      MRN: [id number]  Encounter Provider: Luis Jackson MD  Encounter Date: 2023   Encounter department: [address] [place] [address] PRIMARY CARE    Assessment & Plan     1  Viral upper respiratory tract infection  Assessment & Plan:  Discussed problem  Should use saline nasal rinse at bedtime and in the morning and may use Advil Cold and Sinus as needed  Push fluids  If starts to develop a lot of discolored mucus should call        Depression Screening and Follow-up Plan: Patient was screened for depression during today's encounter  They screened negative with a PHQ-2 score of 0  Subjective      Patient has history of hypertension, reflux esophagitis, allergic rhinitis, urinary incontinence, stress disorder and hypercholesterolemia  today for problems with congestion and cough that started yesterday and overall does not feel well  Has been had cold symptoms over the weekend taking anything for this    Review of Systems   Constitutional: Negative for fever  HENT: Positive for congestion, rhinorrhea and sore throat  Eyes: Negative for itching  Respiratory: Positive for cough  Gastrointestinal: Negative for abdominal pain, constipation, diarrhea and nausea  Musculoskeletal: Positive for myalgias  Neurological: Positive for headaches  Psychiatric/Behavioral: Negative for sleep disturbance         Current Outpatient Medications on File Prior to Visit   Medication Sig   • CALCIUM CARBONATE-VITAMIN D PO Take 1 tablet by mouth 2 (two) times a day   • citalopram (CeleXA) 20 mg tablet Take 1 tablet (20 mg total) by mouth daily   • cyclobenzaprine (FLEXERIL) 10 mg tablet Take 1 tablet (10 mg total) by mouth 3 (three) times a day as needed for muscle spasms   • losartan (COZAAR) 100 MG tablet Take 1 tablet (100 mg total) by mouth daily   • meloxicam (Mobic) 15 mg tablet Take 1 tablet (15 mg total) by mouth daily   • Multiple Vitamins-Minerals (PRESERVISION AREDS 2 PO) Take by mouth   • multivitamin (THERAGRAN) TABS Take 1 tablet by mouth daily   • omeprazole (PriLOSEC OTC) 20 MG tablet Take 20 mg by mouth daily   • psyllium (METAMUCIL) 58 6 % packet Take 1 packet by mouth 3 (three) times a day    • sodium chloride (OCEAN) 0 65 % nasal spray 1 spray into each nostril as needed for congestion   • triamterene-hydrochlorothiazide (MAXZIDE) 75-50 MG per tablet Take 1 tablet by mouth daily       Objective     /70 (BP Location: Left arm, Patient Position: Sitting, Cuff Size: Large)   Pulse 83   Ht 5' 3" (1 6 m)   Wt 77 1 kg (170 lb)   SpO2 95%   BMI 30 11 kg/m²     Physical Exam  Vitals and nursing note reviewed  Constitutional:       Appearance: Normal appearance  She is well-developed  HENT:      Head: Normocephalic  Nose: Congestion (Mucus is clear) present  Mouth/Throat:      Mouth: Mucous membranes are moist    Eyes:      Conjunctiva/sclera: Conjunctivae normal    Neck:      Thyroid: No thyromegaly  Cardiovascular:      Rate and Rhythm: Normal rate and regular rhythm  Heart sounds: Murmur (Midsystolic murmur at left sternal border  Heart rate is 72 ) heard  Pulmonary:      Effort: Pulmonary effort is normal       Breath sounds: Normal breath sounds  Abdominal:      General: Abdomen is flat  There is no distension  Palpations: Abdomen is soft  There is no mass  Tenderness: There is no abdominal tenderness  Musculoskeletal:      Cervical back: Neck supple  No tenderness  Right lower leg: No edema  Left lower leg: No edema  Lymphadenopathy:      Cervical: No cervical adenopathy  Skin:     General: Skin is warm and dry  Findings: No rash  Neurological:      Mental Status: She is alert  Motor: No weakness        Coordination: Coordination normal       Gait: Gait normal    Psychiatric:         Mood and Affect: Mood normal        Billie Chadwick MD

## 2023-01-25 ENCOUNTER — TELEPHONE (OUTPATIENT)
Dept: FAMILY MEDICINE CLINIC | Facility: CLINIC | Age: 68
End: 2023-01-25

## 2023-01-25 DIAGNOSIS — R05.2 SUBACUTE COUGH: Primary | ICD-10-CM

## 2023-01-25 RX ORDER — LEVOFLOXACIN 500 MG/1
500 TABLET, FILM COATED ORAL EVERY 24 HOURS
Qty: 7 TABLET | Refills: 0 | Status: SHIPPED | OUTPATIENT
Start: 2023-01-25 | End: 2023-02-01

## 2023-01-25 NOTE — TELEPHONE ENCOUNTER
Patient was seen last week for a sick visit   Her mucous has now turned green and would like to know if something should be called in

## 2023-01-26 ENCOUNTER — TELEPHONE (OUTPATIENT)
Dept: FAMILY MEDICINE CLINIC | Facility: CLINIC | Age: 68
End: 2023-01-26

## 2023-01-26 DIAGNOSIS — R05.2 SUBACUTE COUGH: Primary | ICD-10-CM

## 2023-01-26 RX ORDER — CLINDAMYCIN HYDROCHLORIDE 150 MG/1
150 CAPSULE ORAL EVERY 8 HOURS SCHEDULED
Qty: 21 CAPSULE | Refills: 0 | Status: SHIPPED | OUTPATIENT
Start: 2023-01-26 | End: 2023-02-02

## 2023-01-26 NOTE — TELEPHONE ENCOUNTER
Patient was given Levaquin yesterday for cough and the pharmacy said it will interact with her other medications  Will you please send in something else  She has green mucous and a cough  No fever

## 2023-01-31 ENCOUNTER — TELEPHONE (OUTPATIENT)
Dept: FAMILY MEDICINE CLINIC | Facility: CLINIC | Age: 68
End: 2023-01-31

## 2023-01-31 DIAGNOSIS — R05.9 COUGH, UNSPECIFIED TYPE: Primary | ICD-10-CM

## 2023-01-31 RX ORDER — BENZONATATE 100 MG/1
100 CAPSULE ORAL 3 TIMES DAILY PRN
Qty: 20 CAPSULE | Refills: 0 | Status: SHIPPED | OUTPATIENT
Start: 2023-01-31

## 2023-01-31 RX ORDER — GUAIFENESIN AND CODEINE PHOSPHATE 100; 10 MG/5ML; MG/5ML
5 SOLUTION ORAL 3 TIMES DAILY PRN
Qty: 473 ML | Refills: 0 | Status: SHIPPED | OUTPATIENT
Start: 2023-01-31

## 2023-01-31 NOTE — TELEPHONE ENCOUNTER
Patient left a voicemail:     "I was in a couple of weeks ago to see Doctor Oliva Cantrell  He had me take Tylenol, sinus and cold  Then I had called back and said that my mucus was green  He put me on a prescription  I am up all night coughing, coughing really bad  I can't sleep well at all  I cough so bad, I wet myself when I'm when I'm coughing   What can I take for the cough?"    Please advise

## 2023-03-07 NOTE — PROGRESS NOTES
Name: Junior Buenrostro      : 1955      MRN: [id number]  Encounter Provider: Isadora London MD  Encounter Date: 10/7/2022   Encounter department: [address] [place] [address] PRIMARY CARE    Assessment & Plan     1  Intercostal pain  Assessment & Plan:  Discussed problem  Avoid twisting activity and when standing over a bench wrist sink should always use 1 ft in front of the other and bend with 1 ft in front of the other  May continue the meloxicam and should use heating pad for 20 minutes at a time  Gave Flexeril 10 mg to use up to 3 times a day although this will probably make her tired    Orders:  -     cyclobenzaprine (FLEXERIL) 10 mg tablet; Take 1 tablet (10 mg total) by mouth 3 (three) times a day as needed for muscle spasms    2  Acute cystitis without hematuria  Assessment & Plan:  Discussed problem  Will place on 7 day course of Cipro 250 mg twice a day    Orders:  -     ciprofloxacin (CIPRO) 250 mg tablet; Take 1 tablet (250 mg total) by mouth every 12 (twelve) hours for 7 days         Subjective      Patient has history of hypertension, reflux esophagitis, allergic rhinitis, urinary incontinence, stress disorder and hypercholesterolemia  Seen today for problems with back pain over the last several days  Is concerned could have kidney infection  Has just been taking the meloxicam for this    Review of Systems   Constitutional: Negative for fever  HENT: Negative for congestion  Respiratory: Negative for cough and shortness of breath  Cardiovascular: Negative for chest pain  Gastrointestinal: Negative for abdominal pain  Endocrine: Positive for polyuria (Nocturia x2 which is baseline)  Genitourinary: Negative for dysuria and hematuria  Musculoskeletal: Positive for back pain  Psychiatric/Behavioral: Positive for sleep disturbance         Current Outpatient Medications on File Prior to Visit   Medication Sig    CALCIUM CARBONATE-VITAMIN D PO Take 1 tablet by mouth 2 (two) times a day    citalopram (CeleXA) 20 mg tablet Take 1 tablet (20 mg total) by mouth daily    losartan (COZAAR) 100 MG tablet Take 1 tablet (100 mg total) by mouth daily    meloxicam (Mobic) 15 mg tablet Take 1 tablet (15 mg total) by mouth daily    Multiple Vitamins-Minerals (PRESERVISION AREDS 2 PO) Take by mouth    multivitamin (THERAGRAN) TABS Take 1 tablet by mouth daily    omeprazole (PriLOSEC OTC) 20 MG tablet Take 20 mg by mouth daily    psyllium (METAMUCIL) 58 6 % packet Take 1 packet by mouth 3 (three) times a day     sodium chloride (OCEAN) 0 65 % nasal spray 1 spray into each nostril as needed for congestion    triamterene-hydrochlorothiazide (MAXZIDE) 75-50 MG per tablet Take 1 tablet by mouth daily       Objective     /72 (BP Location: Left arm, Patient Position: Sitting, Cuff Size: Standard)   Pulse 76   Ht 5' 3" (1 6 m)   Wt 75 3 kg (166 lb)   SpO2 96%   BMI 29 41 kg/m²     Physical Exam  Vitals and nursing note reviewed  Constitutional:       Appearance: Normal appearance  She is well-developed  HENT:      Head: Normocephalic  Nose: Nose normal    Eyes:      Conjunctiva/sclera: Conjunctivae normal    Neck:      Thyroid: No thyromegaly  Cardiovascular:      Rate and Rhythm: Normal rate and regular rhythm  Heart sounds: Normal heart sounds  No murmur heard  Pulmonary:      Effort: Pulmonary effort is normal       Breath sounds: Normal breath sounds  Abdominal:      General: Abdomen is flat  There is no distension  Palpations: Abdomen is soft  There is no mass  Tenderness: There is abdominal tenderness (In the suprapubic area and in the left lower abdominal area)  There is no right CVA tenderness or left CVA tenderness  Musculoskeletal:      Cervical back: Neck supple  Right lower leg: No edema  Left lower leg: No edema        Comments: Bilateral tenderness in the intercostal spaces between the 10th and 12th ribs   Lymphadenopathy: Cervical: No cervical adenopathy  Skin:     General: Skin is warm and dry  Coloration: Skin is not pale  Neurological:      Mental Status: She is alert  Motor: No weakness        Coordination: Coordination normal       Gait: Gait normal    Psychiatric:         Mood and Affect: Mood normal        Dona Marquez MD [facility] Counseling:  I discussed with the patient the risks of adalimumab including but not limited to myelosuppression, immunosuppression, autoimmune hepatitis, demyelinating diseases, lymphoma, and serious infections.  The patient understands that monitoring is required including a PPD at baseline and must alert us or the primary physician if symptoms of infection or other concerning signs are noted.

## 2023-03-20 PROBLEM — J06.9 VIRAL UPPER RESPIRATORY TRACT INFECTION: Status: RESOLVED | Noted: 2023-01-19 | Resolved: 2023-03-20

## 2023-05-03 ENCOUNTER — RA CDI HCC (OUTPATIENT)
Dept: OTHER | Facility: HOSPITAL | Age: 68
End: 2023-05-03

## 2023-05-03 NOTE — PROGRESS NOTES
[address]  coding opportunities       Chart reviewed, no opportunity found: CHART REVIEWED, NO OPPORTUNITY FOUND        Patients Insurance     Medicare Insurance: Medicare

## 2023-05-10 ENCOUNTER — OFFICE VISIT (OUTPATIENT)
Dept: FAMILY MEDICINE CLINIC | Facility: CLINIC | Age: 68
End: 2023-05-10

## 2023-05-10 VITALS
BODY MASS INDEX: 29.48 KG/M2 | HEIGHT: 63 IN | DIASTOLIC BLOOD PRESSURE: 60 MMHG | HEART RATE: 68 BPM | WEIGHT: 166.4 LBS | OXYGEN SATURATION: 97 % | SYSTOLIC BLOOD PRESSURE: 110 MMHG

## 2023-05-10 DIAGNOSIS — F41.1 GENERALIZED ANXIETY DISORDER: Chronic | ICD-10-CM

## 2023-05-10 DIAGNOSIS — E78.00 HYPERCHOLESTEROLEMIA: Chronic | ICD-10-CM

## 2023-05-10 DIAGNOSIS — K21.00 GASTROESOPHAGEAL REFLUX DISEASE WITH ESOPHAGITIS WITHOUT HEMORRHAGE: Chronic | ICD-10-CM

## 2023-05-10 DIAGNOSIS — I10 ESSENTIAL HYPERTENSION: Primary | Chronic | ICD-10-CM

## 2023-05-10 DIAGNOSIS — J30.1 SEASONAL ALLERGIC RHINITIS DUE TO POLLEN: Chronic | ICD-10-CM

## 2023-05-10 NOTE — PROGRESS NOTES
Name: Sylwia Leal      : 1955      MRN: [id number]  Encounter Provider: Dorys Neely MD  Encounter Date: 5/10/2023   Encounter department: [address] [place] [address] PRIMARY CARE    Assessment & Plan     1  Essential hypertension  Assessment & Plan:  Overall stable, continue current regimen    Orders:  -     Renal function panel; Future    2  Hypercholesterolemia  -     Lipid panel; Future    3  Gastroesophageal reflux disease with esophagitis without hemorrhage  Assessment & Plan:  Asymptomatic, continue current regimen      4  Generalized anxiety disorder  Assessment & Plan:  Doing well, continue current regimen      5  Seasonal allergic rhinitis due to pollen  Assessment & Plan:  Mildly symptomatic, continue over-the-counter medication as needed        Tobacco Cessation Counseling: Tobacco cessation counseling was not provided  The patient is sincerely urged to quit consumption of tobacco  She is not ready to quit tobacco  Medication options not discussed  Subjective      Patient has history of hypertension, reflux esophagitis, allergic rhinitis, urinary incontinence, stress disorder and hypercholesterolemia  Overall has been feeling well has been under some stress due to partner's health conditions    Review of Systems   Constitutional: Negative for activity change, appetite change, chills, fatigue, fever and unexpected weight change  HENT: Positive for congestion (Mucus is clear)  Negative for rhinorrhea and trouble swallowing  Eyes: Positive for visual disturbance  Respiratory: Negative for apnea, cough, chest tightness and shortness of breath  Cardiovascular: Negative for chest pain, palpitations and leg swelling  Gastrointestinal: Negative for abdominal distention, abdominal pain, constipation and diarrhea  Endocrine: Positive for polyuria (Nocturia x2-3)  Genitourinary: Positive for enuresis  Negative for difficulty urinating     Musculoskeletal: Positive for "arthralgias  Negative for myalgias  Skin: Negative for rash  Allergic/Immunologic: Positive for environmental allergies  Neurological: Negative for dizziness, weakness, light-headedness, numbness and headaches  Hematological: Negative for adenopathy  Psychiatric/Behavioral: Negative for agitation and sleep disturbance  Current Outpatient Medications on File Prior to Visit   Medication Sig   • CALCIUM CARBONATE-VITAMIN D PO Take 1 tablet by mouth 2 (two) times a day   • citalopram (CeleXA) 20 mg tablet Take 1 tablet (20 mg total) by mouth daily   • cyclobenzaprine (FLEXERIL) 10 mg tablet Take 1 tablet (10 mg total) by mouth 3 (three) times a day as needed for muscle spasms   • losartan (COZAAR) 100 MG tablet Take 1 tablet (100 mg total) by mouth daily   • meloxicam (Mobic) 15 mg tablet Take 1 tablet (15 mg total) by mouth daily   • Multiple Vitamins-Minerals (PRESERVISION AREDS 2 PO) Take by mouth   • multivitamin (THERAGRAN) TABS Take 1 tablet by mouth daily   • omeprazole (PriLOSEC OTC) 20 MG tablet Take 20 mg by mouth daily   • psyllium (METAMUCIL) 58 6 % packet Take 1 packet by mouth 3 (three) times a day    • sodium chloride (OCEAN) 0 65 % nasal spray 1 spray into each nostril as needed for congestion   • triamterene-hydrochlorothiazide (MAXZIDE) 75-50 MG per tablet TAKE 1 TABLET BY MOUTH EVERY DAY   • [DISCONTINUED] benzonatate (TESSALON PERLES) 100 mg capsule Take 1 capsule (100 mg total) by mouth 3 (three) times a day as needed for cough   • [DISCONTINUED] guaifenesin-codeine (GUAIFENESIN AC) 100-10 MG/5ML liquid Take 5 mL by mouth 3 (three) times a day as needed for cough       Objective     /60 (BP Location: Left arm, Patient Position: Sitting, Cuff Size: Large)   Pulse 68   Ht 5' 3\" (1 6 m)   Wt 75 5 kg (166 lb 6 4 oz)   SpO2 97%   BMI 29 48 kg/m²     Physical Exam  Vitals and nursing note reviewed  Constitutional:       Appearance: Normal appearance  She is well-developed   " HENT:      Head: Normocephalic  Nose: Nose normal    Eyes:      Conjunctiva/sclera: Conjunctivae normal    Neck:      Thyroid: No thyromegaly  Vascular: No carotid bruit  Cardiovascular:      Rate and Rhythm: Normal rate and regular rhythm  Heart sounds: Normal heart sounds  No murmur (Rate is 72) heard  Pulmonary:      Effort: Pulmonary effort is normal       Breath sounds: Normal breath sounds  Abdominal:      General: Abdomen is flat  There is no distension  Palpations: Abdomen is soft  There is no mass  Tenderness: There is no abdominal tenderness  Musculoskeletal:      Cervical back: Neck supple  No tenderness  Right lower leg: No edema  Left lower leg: No edema  Lymphadenopathy:      Cervical: No cervical adenopathy  Skin:     General: Skin is warm and dry  Findings: No rash  Neurological:      Mental Status: She is alert  Motor: No weakness  Coordination: Coordination normal       Gait: Gait normal       Deep Tendon Reflexes: Reflexes abnormal (Reflexes 1+)     Psychiatric:         Mood and Affect: Mood normal        Monica Hays MD

## 2023-05-10 NOTE — PATIENT INSTRUCTIONS
Overall seems to be doing well but is having some visual changes and is going to be checking back with ophthalmology  We will check renal and lipid panel for history of hypertension and hypercholesterolemia    Continue to push daily physical activity and watch the starch in the diet

## 2023-05-15 ENCOUNTER — LAB (OUTPATIENT)
Dept: LAB | Facility: CLINIC | Age: 68
End: 2023-05-15

## 2023-05-15 DIAGNOSIS — E78.00 HYPERCHOLESTEROLEMIA: Chronic | ICD-10-CM

## 2023-05-15 DIAGNOSIS — I10 ESSENTIAL HYPERTENSION: Chronic | ICD-10-CM

## 2023-05-15 LAB
ALBUMIN SERPL BCP-MCNC: 4 G/DL (ref 3.5–5)
ANION GAP SERPL CALCULATED.3IONS-SCNC: 2 MMOL/L (ref 4–13)
BUN SERPL-MCNC: 18 MG/DL (ref 5–25)
CALCIUM SERPL-MCNC: 9.1 MG/DL (ref 8.3–10.1)
CHLORIDE SERPL-SCNC: 106 MMOL/L (ref 96–108)
CHOLEST SERPL-MCNC: 245 MG/DL
CO2 SERPL-SCNC: 28 MMOL/L (ref 21–32)
CREAT SERPL-MCNC: 0.63 MG/DL (ref 0.6–1.3)
GFR SERPL CREATININE-BSD FRML MDRD: 93 ML/MIN/1.73SQ M
GLUCOSE P FAST SERPL-MCNC: 88 MG/DL (ref 65–99)
HDLC SERPL-MCNC: 49 MG/DL
LDLC SERPL CALC-MCNC: 131 MG/DL (ref 0–100)
NONHDLC SERPL-MCNC: 196 MG/DL
PHOSPHATE SERPL-MCNC: 3.6 MG/DL (ref 2.3–4.1)
POTASSIUM SERPL-SCNC: 4 MMOL/L (ref 3.5–5.3)
SODIUM SERPL-SCNC: 136 MMOL/L (ref 135–147)
TRIGL SERPL-MCNC: 325 MG/DL

## 2023-05-15 NOTE — RESULT ENCOUNTER NOTE
Please call the patient regarding her abnormal result  Glycerides are still high but are better  Continue to watch sweets and starch in diet    Other labs are okay

## 2023-07-20 DIAGNOSIS — M25.562 CHRONIC PAIN OF BOTH KNEES: ICD-10-CM

## 2023-07-20 DIAGNOSIS — G89.29 CHRONIC PAIN OF BOTH KNEES: ICD-10-CM

## 2023-07-20 DIAGNOSIS — M25.561 CHRONIC PAIN OF BOTH KNEES: ICD-10-CM

## 2023-07-20 RX ORDER — MELOXICAM 15 MG/1
15 TABLET ORAL DAILY
Qty: 90 TABLET | Refills: 1 | Status: SHIPPED | OUTPATIENT
Start: 2023-07-20

## 2023-09-19 DIAGNOSIS — I10 ESSENTIAL HYPERTENSION: Chronic | ICD-10-CM

## 2023-09-19 RX ORDER — LOSARTAN POTASSIUM 100 MG/1
100 TABLET ORAL DAILY
Qty: 90 TABLET | Refills: 1 | Status: SHIPPED | OUTPATIENT
Start: 2023-09-19

## 2023-10-02 ENCOUNTER — OFFICE VISIT (OUTPATIENT)
Dept: FAMILY MEDICINE CLINIC | Facility: CLINIC | Age: 68
End: 2023-10-02
Payer: MEDICARE

## 2023-10-02 ENCOUNTER — TELEPHONE (OUTPATIENT)
Dept: ADMINISTRATIVE | Facility: OTHER | Age: 68
End: 2023-10-02

## 2023-10-02 VITALS
SYSTOLIC BLOOD PRESSURE: 123 MMHG | DIASTOLIC BLOOD PRESSURE: 60 MMHG | BODY MASS INDEX: 28.91 KG/M2 | HEIGHT: 63 IN | OXYGEN SATURATION: 91 % | WEIGHT: 163.14 LBS | HEART RATE: 63 BPM

## 2023-10-02 DIAGNOSIS — N39.3 STRESS INCONTINENCE OF URINE: Chronic | ICD-10-CM

## 2023-10-02 DIAGNOSIS — F33.42 RECURRENT MAJOR DEPRESSIVE DISORDER, IN FULL REMISSION (HCC): ICD-10-CM

## 2023-10-02 DIAGNOSIS — I10 ESSENTIAL HYPERTENSION: Primary | Chronic | ICD-10-CM

## 2023-10-02 DIAGNOSIS — J30.1 SEASONAL ALLERGIC RHINITIS DUE TO POLLEN: Chronic | ICD-10-CM

## 2023-10-02 DIAGNOSIS — K21.00 GASTROESOPHAGEAL REFLUX DISEASE WITH ESOPHAGITIS WITHOUT HEMORRHAGE: Chronic | ICD-10-CM

## 2023-10-02 DIAGNOSIS — E78.00 HYPERCHOLESTEROLEMIA: Chronic | ICD-10-CM

## 2023-10-02 DIAGNOSIS — F41.1 GENERALIZED ANXIETY DISORDER: Chronic | ICD-10-CM

## 2023-10-02 PROCEDURE — 99214 OFFICE O/P EST MOD 30 MIN: CPT | Performed by: FAMILY MEDICINE

## 2023-10-02 RX ORDER — CITALOPRAM 20 MG/1
20 TABLET ORAL DAILY
Qty: 90 TABLET | Refills: 1 | Status: SHIPPED | OUTPATIENT
Start: 2023-10-02

## 2023-10-02 NOTE — TELEPHONE ENCOUNTER
Upon review of the In Basket request we were able to locate, review, and update the patient chart as requested for Mammogram.    Any additional questions or concerns should be emailed to the Practice Liaisons via the appropriate education email address, please do not reply via In Basket.     Thank you  Roopa Boo

## 2023-10-02 NOTE — TELEPHONE ENCOUNTER
----- Message from Xiao Silverman sent at 10/2/2023  9:55 AM EDT -----  Regarding: Care gap request  10/02/23 9:55 AM    Hello, our patient No patient name on file. has had Mammogram completed/performed. Please assist in updating the patient chart by pulling the document from the Imaging/Procedure Tab. The date of service is 01/31/2023.      Thank you,  Xiao Silverman   [address] [facility]

## 2023-10-02 NOTE — PATIENT INSTRUCTIONS
Overall seems to be doing well. Did have a little sore throat and some congestion which could relate to allergy or a mild cold. Just use a salt water nasal rinse at bedtime and in the morning. Has had flu shot.   Continue all meds as is

## 2023-10-02 NOTE — PROGRESS NOTES
Name: Matteo Guzman      : 1955      MRN: [id number]  Encounter Provider: Brandy Ragland MD  Encounter Date: 10/2/2023   Encounter department: [address] [place] [address] PRIMARY CARE    Assessment & Plan     1. Essential hypertension  Assessment & Plan:  Overall stable, continue current regimen      2. Recurrent major depressive disorder, in full remission ([address])  -     citalopram (CeleXA) 20 mg tablet; Take 1 tablet (20 mg total) by mouth daily    3. Generalized anxiety disorder  Assessment & Plan:  Is under stress but I feel is functioning well and will continue current meds      4. Gastroesophageal reflux disease with esophagitis without hemorrhage  Assessment & Plan:  Asymptomatic, continue current regimen      5. Hypercholesterolemia  Assessment & Plan:  Continue to push fiber and activity      6. Stress incontinence of urine  Assessment & Plan:  Only mildly symptomatic. Continue regular Kegel exercises      7. Seasonal allergic rhinitis due to pollen  Assessment & Plan:  Doing well, continue as needed over-the-counter meds           Subjective      Patient has history of hypertension, reflux esophagitis, allergic rhinitis, urinary incontinence, stress disorder and hypercholesterolemia. Overall has been feeling well has been under some stress due to partner's health conditions. Did have a sore throat yesterday with mild congestion although the throat is better today    Review of Systems   Constitutional: Negative for activity change, appetite change, chills, fatigue, fever and unexpected weight change. HENT: Positive for sore throat and voice change. Negative for congestion, rhinorrhea and trouble swallowing. Eyes: Negative for itching and visual disturbance. Respiratory: Negative for apnea, cough, chest tightness and shortness of breath. Cardiovascular: Negative for chest pain, palpitations and leg swelling.    Gastrointestinal: Negative for abdominal distention, abdominal pain, constipation and diarrhea. Endocrine: Positive for polyuria (Nocturia x2). Genitourinary: Negative for enuresis. Musculoskeletal: Negative for arthralgias and myalgias. Skin: Negative for rash. Allergic/Immunologic: Positive for environmental allergies. Neurological: Negative for dizziness, weakness, light-headedness, numbness and headaches. Hematological: Negative for adenopathy. Psychiatric/Behavioral: Negative for agitation and sleep disturbance. Current Outpatient Medications on File Prior to Visit   Medication Sig   • CALCIUM CARBONATE-VITAMIN D PO Take 1 tablet by mouth 2 (two) times a day   • cyclobenzaprine (FLEXERIL) 10 mg tablet Take 1 tablet (10 mg total) by mouth 3 (three) times a day as needed for muscle spasms   • losartan (COZAAR) 100 MG tablet TAKE 1 TABLET BY MOUTH EVERY DAY   • meloxicam (MOBIC) 15 mg tablet TAKE 1 TABLET (15 MG TOTAL) BY MOUTH DAILY. • Multiple Vitamins-Minerals (PRESERVISION AREDS 2 PO) Take by mouth   • multivitamin (THERAGRAN) TABS Take 1 tablet by mouth daily   • omeprazole (PriLOSEC OTC) 20 MG tablet Take 20 mg by mouth daily   • psyllium (METAMUCIL) 58.6 % packet Take 1 packet by mouth 3 (three) times a day    • sodium chloride (OCEAN) 0.65 % nasal spray 1 spray into each nostril as needed for congestion   • triamterene-hydrochlorothiazide (MAXZIDE) 75-50 MG per tablet TAKE 1 TABLET BY MOUTH EVERY DAY   • [DISCONTINUED] citalopram (CeleXA) 20 mg tablet Take 1 tablet (20 mg total) by mouth daily       Objective     /60 (BP Location: Left arm, Patient Position: Sitting, Cuff Size: Standard)   Pulse 63   Ht 5' 3" (1.6 m)   Wt 74 kg (163 lb 2.3 oz)   SpO2 91%   BMI 28.90 kg/m²     Physical Exam  Vitals and nursing note reviewed. Constitutional:       Appearance: Normal appearance. She is well-developed. HENT:      Head: Normocephalic.       Nose: Nose normal.   Eyes:      Conjunctiva/sclera: Conjunctivae normal.   Neck:      Thyroid: No thyromegaly. Vascular: No carotid bruit. Cardiovascular:      Rate and Rhythm: Normal rate and regular rhythm. Heart sounds: Normal heart sounds. No murmur (Rate is 72) heard. Pulmonary:      Effort: Pulmonary effort is normal.      Breath sounds: Normal breath sounds. Abdominal:      General: Abdomen is flat. There is no distension. Palpations: Abdomen is soft. There is no mass. Tenderness: There is no abdominal tenderness. Musculoskeletal:      Cervical back: Neck supple. No tenderness. Right lower leg: No edema. Left lower leg: No edema. Lymphadenopathy:      Cervical: No cervical adenopathy. Skin:     General: Skin is warm and dry. Findings: No rash. Neurological:      Mental Status: She is alert. Motor: No weakness. Coordination: Coordination normal.      Gait: Gait normal.      Deep Tendon Reflexes: Reflexes normal.   Psychiatric:         Mood and Affect: Mood normal.         Behavior: Behavior normal.         Thought Content:  Thought content normal.         Judgment: Judgment normal.       Ro Esquivel MD

## 2024-01-10 DIAGNOSIS — M25.562 CHRONIC PAIN OF BOTH KNEES: ICD-10-CM

## 2024-01-10 DIAGNOSIS — G89.29 CHRONIC PAIN OF BOTH KNEES: ICD-10-CM

## 2024-01-10 DIAGNOSIS — M25.561 CHRONIC PAIN OF BOTH KNEES: ICD-10-CM

## 2024-01-10 RX ORDER — MELOXICAM 15 MG/1
15 TABLET ORAL DAILY
Qty: 90 TABLET | Refills: 1 | Status: SHIPPED | OUTPATIENT
Start: 2024-01-10

## 2024-02-02 ENCOUNTER — RA CDI HCC (OUTPATIENT)
Dept: OTHER | Facility: HOSPITAL | Age: 69
End: 2024-02-02

## 2024-02-09 ENCOUNTER — OFFICE VISIT (OUTPATIENT)
Dept: FAMILY MEDICINE CLINIC | Facility: CLINIC | Age: 69
End: 2024-02-09
Payer: MEDICARE

## 2024-02-09 VITALS
SYSTOLIC BLOOD PRESSURE: 122 MMHG | OXYGEN SATURATION: 98 % | BODY MASS INDEX: 28.53 KG/M2 | HEIGHT: 63 IN | HEART RATE: 65 BPM | WEIGHT: 161 LBS | DIASTOLIC BLOOD PRESSURE: 76 MMHG

## 2024-02-09 DIAGNOSIS — Z00.00 MEDICARE ANNUAL WELLNESS VISIT, SUBSEQUENT: ICD-10-CM

## 2024-02-09 DIAGNOSIS — F41.1 GENERALIZED ANXIETY DISORDER: Chronic | ICD-10-CM

## 2024-02-09 DIAGNOSIS — I10 ESSENTIAL HYPERTENSION: Primary | Chronic | ICD-10-CM

## 2024-02-09 DIAGNOSIS — N39.3 STRESS INCONTINENCE OF URINE: Chronic | ICD-10-CM

## 2024-02-09 DIAGNOSIS — E78.00 HYPERCHOLESTEROLEMIA: Chronic | ICD-10-CM

## 2024-02-09 PROBLEM — R07.82 INTERCOSTAL PAIN: Status: RESOLVED | Noted: 2022-10-07 | Resolved: 2024-02-09

## 2024-02-09 PROCEDURE — 99214 OFFICE O/P EST MOD 30 MIN: CPT | Performed by: FAMILY MEDICINE

## 2024-02-09 PROCEDURE — G0444 DEPRESSION SCREEN ANNUAL: HCPCS | Performed by: FAMILY MEDICINE

## 2024-02-09 PROCEDURE — G0439 PPPS, SUBSEQ VISIT: HCPCS | Performed by: FAMILY MEDICINE

## 2024-02-09 NOTE — PATIENT INSTRUCTIONS
Medicare Preventive Visit Patient Instructions  Thank you for completing your Welcome to Medicare Visit or Medicare Annual Wellness Visit today. Your next wellness visit will be due in one year (2/9/2025).  The screening/preventive services that you may require over the next 5-10 years are detailed below. Some tests may not apply to you based off risk factors and/or age. Screening tests ordered at today's visit but not completed yet may show as past due. Also, please note that scanned in results may not display below.  Preventive Screenings:  Service Recommendations Previous Testing/Comments   Colorectal Cancer Screening  * Colonoscopy    * Fecal Occult Blood Test (FOBT)/Fecal Immunochemical Test (FIT)  * Fecal DNA/Cologuard Test  * Flexible Sigmoidoscopy Age: 45-75 years old   Colonoscopy: every 10 years (may be performed more frequently if at higher risk)  OR  FOBT/FIT: every 1 year  OR  Cologuard: every 3 years  OR  Sigmoidoscopy: every 5 years  Screening may be recommended earlier than age 45 if at higher risk for colorectal cancer. Also, an individualized decision between you and your healthcare provider will decide whether screening between the ages of 76-85 would be appropriate. Colonoscopy: 08/14/2020  FOBT/FIT: Not on file  Cologuard: Not on file  Sigmoidoscopy: Not on file    Screening Current     Breast Cancer Screening Age: 40+ years old  Frequency: every 1-2 years  Not required if history of left and right mastectomy Mammogram: 01/31/2023    Screening Current   Cervical Cancer Screening Between the ages of 21-29, pap smear recommended once every 3 years.   Between the ages of 30-65, can perform pap smear with HPV co-testing every 5 years.   Recommendations may differ for women with a history of total hysterectomy, cervical cancer, or abnormal pap smears in past. Pap Smear: 01/16/2020    Screening Not Indicated   Hepatitis C Screening Once for adults born between 1945 and 1965  More frequently in  patients at high risk for Hepatitis C Hep C Antibody: Not on file        Diabetes Screening 1-2 times per year if you're at risk for diabetes or have pre-diabetes Fasting glucose: 88 mg/dL (5/15/2023)  A1C: No results in last 5 years (No results in last 5 years)  Screening Current   Cholesterol Screening Once every 5 years if you don't have a lipid disorder. May order more often based on risk factors. Lipid panel: 05/15/2023    Screening Not Indicated  History Lipid Disorder     Other Preventive Screenings Covered by Medicare:  Abdominal Aortic Aneurysm (AAA) Screening: covered once if your at risk. You're considered to be at risk if you have a family history of AAA.  Lung Cancer Screening: covers low dose CT scan once per year if you meet all of the following conditions: (1) Age 55-77; (2) No signs or symptoms of lung cancer; (3) Current smoker or have quit smoking within the last 15 years; (4) You have a tobacco smoking history of at least 20 pack years (packs per day multiplied by number of years you smoked); (5) You get a written order from a healthcare provider.  Glaucoma Screening: covered annually if you're considered high risk: (1) You have diabetes OR (2) Family history of glaucoma OR (3)  aged 50 and older OR (4)  American aged 65 and older  Osteoporosis Screening: covered every 2 years if you meet one of the following conditions: (1) You're estrogen deficient and at risk for osteoporosis based off medical history and other findings; (2) Have a vertebral abnormality; (3) On glucocorticoid therapy for more than 3 months; (4) Have primary hyperparathyroidism; (5) On osteoporosis medications and need to assess response to drug therapy.   Last bone density test (DXA Scan): 02/10/2022.  HIV Screening: covered annually if you're between the age of 15-65. Also covered annually if you are younger than 15 and older than 65 with risk factors for HIV infection. For pregnant patients, it is  covered up to 3 times per pregnancy.    Immunizations:  Immunization Recommendations   Influenza Vaccine Annual influenza vaccination during flu season is recommended for all persons aged >= 6 months who do not have contraindications   Pneumococcal Vaccine   * Pneumococcal conjugate vaccine = PCV13 (Prevnar 13), PCV15 (Vaxneuvance), PCV20 (Prevnar 20)  * Pneumococcal polysaccharide vaccine = PPSV23 (Pneumovax) Adults 19-65 yo with certain risk factors or if 65+ yo  If never received any pneumonia vaccine: recommend Prevnar 20 (PCV20)  Give PCV20 if previously received 1 dose of PCV13 or PPSV23   Hepatitis B Vaccine 3 dose series if at intermediate or high risk (ex: diabetes, end stage renal disease, liver disease)   Respiratory syncytial virus (RSV) Vaccine - COVERED BY MEDICARE PART D  * RSVPreF3 (Arexvy) CDC recommends that adults 60 years of age and older may receive a single dose of RSV vaccine using shared clinical decision-making (SCDM)   Tetanus (Td) Vaccine - COST NOT COVERED BY MEDICARE PART B Following completion of primary series, a booster dose should be given every 10 years to maintain immunity against tetanus. Td may also be given as tetanus wound prophylaxis.   Tdap Vaccine - COST NOT COVERED BY MEDICARE PART B Recommended at least once for all adults. For pregnant patients, recommended with each pregnancy.   Shingles Vaccine (Shingrix) - COST NOT COVERED BY MEDICARE PART B  2 shot series recommended in those 19 years and older who have or will have weakened immune systems or those 50 years and older     Health Maintenance Due:      Topic Date Due    Hepatitis C Screening  Never done    DXA SCAN  02/10/2024    Colorectal Cancer Screening  08/14/2024    Breast Cancer Screening: Mammogram  02/05/2025     Immunizations Due:      Topic Date Due    Pneumococcal Vaccine: 65+ Years (2 - PCV) 09/14/2022    Influenza Vaccine (1) 09/01/2023    COVID-19 Vaccine (4 - 2023-24 season) 09/01/2023     Advance  Directives   What are advance directives?  Advance directives are legal documents that state your wishes and plans for medical care. These plans are made ahead of time in case you lose your ability to make decisions for yourself. Advance directives can apply to any medical decision, such as the treatments you want, and if you want to donate organs.   What are the types of advance directives?  There are many types of advance directives, and each state has rules about how to use them. You may choose a combination of any of the following:  Living will:  This is a written record of the treatment you want. You can also choose which treatments you do not want, which to limit, and which to stop at a certain time. This includes surgery, medicine, IV fluid, and tube feedings.   Durable power of  for healthcare (DPAHC):  This is a written record that states who you want to make healthcare choices for you when you are unable to make them for yourself. This person, called a proxy, is usually a family member or a friend. You may choose more than 1 proxy.  Do not resuscitate (DNR) order:  A DNR order is used in case your heart stops beating or you stop breathing. It is a request not to have certain forms of treatment, such as CPR. A DNR order may be included in other types of advance directives.  Medical directive:  This covers the care that you want if you are in a coma, near death, or unable to make decisions for yourself. You can list the treatments you want for each condition. Treatment may include pain medicine, surgery, blood transfusions, dialysis, IV or tube feedings, and a ventilator (breathing machine).  Values history:  This document has questions about your views, beliefs, and how you feel and think about life. This information can help others choose the care that you would choose.  Why are advance directives important?  An advance directive helps you control your care. Although spoken wishes may be used, it  is better to have your wishes written down. Spoken wishes can be misunderstood, or not followed. Treatments may be given even if you do not want them. An advance directive may make it easier for your family to make difficult choices about your care.   Urinary Incontinence   Urinary incontinence (UI)  is when you lose control of your bladder. UI develops because your bladder cannot store or empty urine properly. The 3 most common types of UI are stress incontinence, urge incontinence, or both.  Medicines:   May be given to help strengthen your bladder control. Report any side effects of medication to your healthcare provider.  Do pelvic muscle exercises often:  Your pelvic muscles help you stop urinating. Squeeze these muscles tight for 5 seconds, then relax for 5 seconds. Gradually work up to squeezing for 10 seconds. Do 3 sets of 15 repetitions a day, or as directed. This will help strengthen your pelvic muscles and improve bladder control.  Train your bladder:  Go to the bathroom at set times, such as every 2 hours, even if you do not feel the urge to go. You can also try to hold your urine when you feel the urge to go. For example, hold your urine for 5 minutes when you feel the urge to go. As that becomes easier, hold your urine for 10 minutes.   Self-care:   Keep a UI record.  Write down how often you leak urine and how much you leak. Make a note of what you were doing when you leaked urine.  Drink liquids as directed. You may need to limit the amount of liquid you drink to help control your urine leakage. Do not drink any liquid right before you go to bed. Limit or do not have drinks that contain caffeine or alcohol.   Prevent constipation.  Eat a variety of high-fiber foods. Good examples are high-fiber cereals, beans, vegetables, and whole-grain breads. Walking is the best way to trigger your intestines to have a bowel movement.  Exercise regularly and maintain a healthy weight.  Weight loss and exercise  will decrease pressure on your bladder and help you control your leakage.   Use a catheter as directed  to help empty your bladder. A catheter is a tiny, plastic tube that is put into your bladder to drain your urine.   Go to behavior therapy as directed.  Behavior therapy may be used to help you learn to control your urge to urinate.    Cigarette Smoking and Your Health   Risks to your health if you smoke:  Nicotine and other chemicals found in tobacco damage every cell in your body. Even if you are a light smoker, you have an increased risk for cancer, heart disease, and lung disease. If you are pregnant or have diabetes, smoking increases your risk for complications.   Benefits to your health if you stop smoking:   You decrease respiratory symptoms such as coughing, wheezing, and shortness of breath.   You reduce your risk for cancers of the lung, mouth, throat, kidney, bladder, pancreas, stomach, and cervix. If you already have cancer, you increase the benefits of chemotherapy. You also reduce your risk for cancer returning or a second cancer from developing.   You reduce your risk for heart disease, blood clots, heart attack, and stroke.   You reduce your risk for lung infections, and diseases such as pneumonia, asthma, chronic bronchitis, and emphysema.  Your circulation improves. More oxygen can be delivered to your body. If you have diabetes, you lower your risk for complications, such as kidney, artery, and eye diseases. You also lower your risk for nerve damage. Nerve damage can lead to amputations, poor vision, and blindness.  You improve your body's ability to heal and to fight infections.  For more information and support to stop smoking:   [email or web address]  Phone: 2- 618 - [phone]  Web Address: [email or web address]  Weight Management   Why it is important to manage your weight:  Being overweight increases your risk of health conditions such as heart disease, high blood pressure, type 2 diabetes, and certain  types of cancer. It can also increase your risk for osteoarthritis, sleep apnea, and other respiratory problems. Aim for a slow, steady weight loss. Even a small amount of weight loss can lower your risk of health problems.  How to lose weight safely:  A safe and healthy way to lose weight is to eat fewer calories and get regular exercise. You can lose up about 1 pound a week by decreasing the number of calories you eat by 500 calories each day.   Healthy meal plan for weight management:  A healthy meal plan includes a variety of foods, contains fewer calories, and helps you stay healthy. A healthy meal plan includes the following:  Eat whole-grain foods more often.  A healthy meal plan should contain fiber. Fiber is the part of grains, fruits, and vegetables that is not broken down by your body. Whole-grain foods are healthy and provide extra fiber in your diet. Some examples of whole-grain foods are whole-wheat breads and pastas, oatmeal, brown rice, and bulgur.  Eat a variety of vegetables every day.  Include dark, leafy greens such as spinach, kale, ameya greens, and mustard greens. Eat yellow and orange vegetables such as carrots, sweet potatoes, and winter squash.   Eat a variety of fruits every day.  Choose fresh or canned fruit (canned in its own juice or light syrup) instead of juice. Fruit juice has very little or no fiber.  Eat low-fat dairy foods.  Drink fat-free (skim) milk or 1% milk. Eat fat-free yogurt and low-fat cottage cheese. Try low-fat cheeses such as mozzarella and other reduced-fat cheeses.  Choose meat and other protein foods that are low in fat.  Choose beans or other legumes such as split peas or lentils. Choose fish, skinless poultry (chicken or turkey), or lean cuts of red meat (beef or pork). Before you cook meat or poultry, cut off any visible fat.   Use less fat and oil.  Try baking foods instead of frying them. Add less fat, such as margarine, sour cream, regular salad dressing and  mayonnaise to foods. Eat fewer high-fat foods. Some examples of high-fat foods include french fries, doughnuts, ice cream, and cakes.  Eat fewer sweets.  Limit foods and drinks that are high in sugar. This includes candy, cookies, regular soda, and sweetened drinks.  Exercise:  Exercise at least 30 minutes per day on most days of the week. Some examples of exercise include walking, biking, dancing, and swimming. You can also fit in more physical activity by taking the stairs instead of the elevator or parking farther away from stores. Ask your healthcare provider about the best exercise plan for you.      © Copyright [employer] 2018 Information is for End User's use only and may not be sold, redistributed or otherwise used for commercial purposes. All illustrations and images included in CareNotes® are the copyrighted property of [employer] or [employer].  Overall patient is very functional.  Is following up for an abnormal mammogram and is somewhat nervous about this.  Will check renal and lipid panel for history of hypertension and hypercholesterolemia but should do this 5/15 or after.  Try to push some daily walking activity and continue to watch the starch in the diet.  Will be due for repeat tetanus booster in May

## 2024-02-09 NOTE — PROGRESS NOTES
Assessment and Plan:     Problem List Items Addressed This Visit          Cardiovascular and Mediastinum    Essential hypertension - Primary (Chronic)     Overall stable, continue current regimen.  Will check renal panel         Relevant Orders    Renal function panel       Other    Generalized anxiety disorder (Chronic)     Somewhat stressed at this time but has been functioning well.  Will continue baseline medication         Hypercholesterolemia (Chronic)     Will check lipid panel.  Push fiber in diet         Relevant Orders    Lipid panel    Stress incontinence of urine (Chronic)     Needs to be sure to do the Kegel exercises on a regular basis          Other Visit Diagnoses       Medicare annual wellness visit, subsequent                Depression Screening and Follow-up Plan: Patient was screened for depression during today's encounter. They screened negative with a PHQ-2 score of 0.    Urinary Incontinence Plan of Care: counseling topics discussed: practice Kegel (pelvic floor strengthening) exercises and limiting fluid intake 3-4 hours before bed.     Tobacco Cessation Counseling: Tobacco cessation counseling was provided. The patient is sincerely urged to quit consumption of tobacco. She is not ready to quit tobacco. Medication options not discussed.       Preventive health issues were discussed with patient, and age appropriate screening tests were ordered as noted in patient's After Visit Summary.  Personalized health advice and appropriate referrals for health education or preventive services given if needed, as noted in patient's After Visit Summary.     History of Present Illness:     Patient presents for a Medicare Wellness Visit    Subjective     Patient has history of hypertension, reflux esophagitis, allergic rhinitis, urinary incontinence, stress disorder and hypercholesterolemia.  Has been upset because has pending follow-up studies because of abnormal mammogram       Patient Care Team:  Vikash  Jose G Zurita MD as PCP - General (Family Medicine)     Review of Systems:     Review of Systems   Constitutional:  Negative for activity change, appetite change, chills, fatigue, fever and unexpected weight change.   HENT:  Negative for congestion, dental problem, hearing loss, rhinorrhea and trouble swallowing.    Eyes:  Negative for visual disturbance.   Respiratory:  Negative for apnea, cough, chest tightness and shortness of breath.    Cardiovascular:  Negative for chest pain, palpitations and leg swelling.   Gastrointestinal:  Negative for abdominal distention, abdominal pain, constipation and diarrhea.   Endocrine: Positive for polyuria (Nocturia x 1-3).   Genitourinary:  Positive for enuresis.   Musculoskeletal:  Positive for arthralgias. Negative for myalgias.   Skin:  Negative for rash.   Allergic/Immunologic: Positive for environmental allergies.   Neurological:  Negative for dizziness, weakness, light-headedness, numbness and headaches.   Hematological:  Negative for adenopathy.   Psychiatric/Behavioral:  Positive for sleep disturbance (Because of the abnormal mammogram). Negative for agitation. The patient is nervous/anxious (Because of the abnormal mammogram).         Problem List:     Patient Active Problem List   Diagnosis    Essential hypertension    Hypercholesterolemia    Seasonal allergic rhinitis due to pollen    Generalized anxiety disorder    Stress incontinence of urine    Gastro-esophageal reflux disease with esophagitis    Dermatitis    Osteopenia of multiple sites      Past Medical and Surgical History:     Past Medical History:   Diagnosis Date    Allergic Young child    Allergic rhinitis     Functional disorder of bladder     GERD (gastroesophageal reflux disease)     Hypertension     Otitis media Child    Urinary tract infection 1970's    Visual impairment 2019    Age related macular degeneration     Past Surgical History:   Procedure Laterality Date    HYSTERECTOMY  04/23/2019     total    TUBAL LIGATION  Not sure      Family History:     Family History   Problem Relation Age of Onset    Breast cancer Maternal Aunt         approx 70 yrs of age     No Known Problems Mother     No Known Problems Father     No Known Problems Daughter     No Known Problems Maternal Grandmother     No Known Problems Maternal Grandfather     Emphysema Paternal Grandmother     Arthritis Paternal Grandmother     No Known Problems Paternal Grandfather     Breast cancer Paternal Aunt     No Known Problems Paternal Aunt     COPD Son       Social History:     Social History     Socioeconomic History    Marital status:      Spouse name: None    Number of children: None    Years of education: None    Highest education level: None   Occupational History    None   Tobacco Use    Smoking status: Every Day     Current packs/day: 1.00     Average packs/day: 1 pack/day for 42.0 years (42.0 total pack years)     Types: Cigarettes    Smokeless tobacco: Never   Vaping Use    Vaping status: Never Used   Substance and Sexual Activity    Alcohol use: Yes     Comment: Occasionally     Drug use: Never    Sexual activity: Yes     Partners: Male     Comment: Birth control pill, tubes tied   Other Topics Concern    None   Social History Narrative    None     Social Determinants of Health     Financial Resource Strain: Patient Declined (2/5/2024)    Overall Financial Resource Strain (CARDIA)     Difficulty of Paying Living Expenses: Patient declined   Food Insecurity: Not on file   Transportation Needs: Patient Declined (2/5/2024)    PRAPARE - Transportation     Lack of Transportation (Medical): Patient declined     Lack of Transportation (Non-Medical): Patient declined   Physical Activity: Not on file   Stress: Not on file   Social Connections: Not on file   Intimate Partner Violence: Not on file   Housing Stability: Not on file      Medications and Allergies:     Current Outpatient Medications   Medication Sig Dispense Refill     CALCIUM CARBONATE-VITAMIN D PO Take 1 tablet by mouth 2 (two) times a day      citalopram (CeleXA) 20 mg tablet Take 1 tablet (20 mg total) by mouth daily 90 tablet 1    cyclobenzaprine (FLEXERIL) 10 mg tablet Take 1 tablet (10 mg total) by mouth 3 (three) times a day as needed for muscle spasms 15 tablet 1    losartan (COZAAR) 100 MG tablet TAKE 1 TABLET BY MOUTH EVERY DAY 90 tablet 1    meloxicam (MOBIC) 15 mg tablet Take 1 tablet (15 mg total) by mouth daily 90 tablet 1    Multiple Vitamins-Minerals (PRESERVISION AREDS 2 PO) Take by mouth      multivitamin (THERAGRAN) TABS Take 1 tablet by mouth daily      omeprazole (PriLOSEC OTC) 20 MG tablet Take 20 mg by mouth daily      psyllium (METAMUCIL) 58.6 % packet Take 1 packet by mouth 3 (three) times a day       sodium chloride (OCEAN) 0.65 % nasal spray 1 spray into each nostril as needed for congestion      triamterene-hydrochlorothiazide (MAXZIDE) 75-50 MG per tablet TAKE 1 TABLET BY MOUTH EVERY DAY 90 tablet 3     No current facility-administered medications for this visit.     No Known Allergies   Immunizations:     Immunization History   Administered Date(s) Administered    COVID-19 MODERNA VACC 0.5 ML IM 03/21/2021, 04/21/2021, 10/28/2021, 04/12/2022    COVID-19 Moderna Vac BIVALENT 12 Yr+ IM 0.5 ML 11/03/2022, 05/26/2023    COVID-19 Moderna mRNA Vaccine 12 Yr+ 50 mcg/0.5 mL (Spikevax) 11/24/2023    INFLUENZA 01/05/2009, 10/01/2009, 11/08/2010, 09/16/2011, 08/29/2013, 09/11/2013, 10/10/2014, 10/02/2015, 09/30/2016, 09/08/2017, 09/07/2018, 09/13/2019, 08/24/2021, 09/08/2022, 09/19/2023    Influenza, injectable, quadrivalent, preservative free 0.5 mL 09/07/2018, 09/13/2019, 09/04/2020    Influenza, seasonal, injectable 01/04/2009, 10/01/2009, 11/07/2010, 09/16/2011, 10/02/2015, 09/30/2016    Influenza, seasonal, injectable, preservative free 08/29/2013    Pneumococcal Polysaccharide PPV23 09/14/2021    Tdap 05/07/2014    Zoster Vaccine Recombinant 11/26/2018,  03/27/2019      Health Maintenance:         Topic Date Due    Hepatitis C Screening  Never done    Lung Cancer Screening  Never done    DXA SCAN  02/10/2024    Colorectal Cancer Screening  08/14/2024    Breast Cancer Screening: Mammogram  02/05/2025         Topic Date Due    Pneumococcal Vaccine: 65+ Years (2 - PCV) 09/14/2022    COVID-19 Vaccine (8 - 2023-24 season) 01/19/2024      Medicare Screening Tests and Risk Assessments:     Danita is here for her Subsequent Wellness visit. Last Medicare Wellness visit information reviewed, patient interviewed and updates made to the record today.      Health Risk Assessment:   Patient rates overall health as good. Patient feels that their physical health rating is same. Patient is satisfied with their life. Eyesight was rated as slightly worse. Hearing was rated as same. Patient feels that their emotional and mental health rating is same. Patients states they are never, rarely angry. Patient states they are never, rarely unusually tired/fatigued. Pain experienced in the last 7 days has been none. Patient states that she has experienced no weight loss or gain in last 6 months. No issues.  Is following up with ophthalmology for the macular degeneration    Depression Screening:   PHQ-2 Score: 0      Fall Risk Screening:   In the past year, patient has experienced: no history of falling in past year      Urinary Incontinence Screening:   Patient has leaked urine accidently in the last six months. Do Kegel exercises on a regular basis and watch fluid intake at night    Home Safety:  Patient does not have trouble with stairs inside or outside of their home. Patient has working smoke alarms and has no working carbon monoxide detector. Home safety hazards include: none. I did advise carbon monoxide monitor in the house and grab bars in the bathroom    Nutrition:   Current diet is Regular, No Added Salt and Limited junk food. Watch starch in diet    Medications:   Patient is  currently taking over-the-counter supplements. OTC medications include: see medication list. Patient is able to manage medications. No issues    Activities of Daily Living (ADLs)/Instrumental Activities of Daily Living (IADLs):   Walk and transfer into and out of bed and chair?: Yes  Dress and groom yourself?: Yes    Bathe or shower yourself?: Yes    Feed yourself? Yes  Do your laundry/housekeeping?: Yes  Manage your money, pay your bills and track your expenses?: Yes  Make your own meals?: Yes    Do your own shopping?: Yes    ADL comments: Overall patient is very functional around the house.  No issues    Previous Hospitalizations:   Any hospitalizations or ED visits within the last 12 months?: No      Advance Care Planning:   Living will: Yes    Durable POA for healthcare: Yes    Advanced directive: Yes    Advanced directive counseling given: Yes    Five wishes given: No    End of Life Decisions reviewed with patient: No      Comments: Should bring this into the office to have scanned into the chart    Cognitive Screening:   Provider or family/friend/caregiver concerned regarding cognition?: No    PREVENTIVE SCREENINGS      Cardiovascular Screening:    General: Screening Not Indicated and History Lipid Disorder    Due for: Lipid Panel      Diabetes Screening:     General: Screening Current    Due for: Blood Glucose      Colorectal Cancer Screening:     General: Screening Current      Breast Cancer Screening:     General: Screening Current      Cervical Cancer Screening:    General: Screening Not Indicated      Osteoporosis Screening:    General: Screening Current      Abdominal Aortic Aneurysm (AAA) Screening:        General: Screening Not Indicated      Lung Cancer Screening:     General: Risks and Benefits Discussed      Hepatitis C Screening:    General: Risks and Benefits Discussed      Preventive Screening Comments: Continue follow-up for routine eye care and have reports forwarded to office    Screening,  "Brief Intervention, and Referral to Treatment (SBIRT)    Screening  Typical number of drinks in a day: 0  Typical number of drinks in a week: 0  Interpretation: Low risk drinking behavior.    AUDIT-C Screenin) How often did you have a drink containing alcohol in the past year? never  2) How many drinks did you have on a typical day when you were drinking in the past year? 0  3) How often did you have 6 or more drinks on one occasion in the past year? never    AUDIT-C Score: 0  Interpretation: Score 0-2 (female): Negative screen for alcohol misuse    Single Item Drug Screening:  How often have you used an illegal drug (including marijuana) or a prescription medication for non-medical reasons in the past year? never    Single Item Drug Screen Score: 0  Interpretation: Negative screen for possible drug use disorder    Brief Intervention  Alcohol & drug use screenings were reviewed. No concerns regarding substance use disorder identified.     Annual Depression Screening  Time spent screening and evaluating the patient for depression during today's encounter was 5 minutes.    No results found.     Physical Exam:     Blood Pressure 122/76 (BP Location: Left arm, Patient Position: Sitting, Cuff Size: Standard)   Pulse 65   Height 5' 3\" (1.6 m)   Weight 73 kg (161 lb)   Oxygen Saturation 98%   Body Mass Index 28.52 kg/m²     Physical Exam  Vitals and nursing note reviewed.   Constitutional:       Appearance: Normal appearance. She is not ill-appearing.   HENT:      Head: Normocephalic.      Right Ear: Tympanic membrane, ear canal and external ear normal. Decreased hearing (25 dB hearing screen off at 500 Hz and 4000 Hz) noted.      Left Ear: Tympanic membrane, ear canal and external ear normal. Decreased hearing (25 dB hearing screen off except at 2000 Hz.  No difficulty with conversation) noted.      Nose: Nose normal.      Mouth/Throat:      Mouth: Mucous membranes are moist.      Dentition: Abnormal dentition " (Multiple missing lower teeth). Has dentures (Upper dentures).   Eyes:      Extraocular Movements: Extraocular movements intact.      Conjunctiva/sclera: Conjunctivae normal.      Pupils: Pupils are equal, round, and reactive to light.   Neck:      Vascular: No carotid bruit.   Cardiovascular:      Rate and Rhythm: Normal rate and regular rhythm.      Pulses:           Dorsalis pedis pulses are 2+ on the right side and 2+ on the left side.        Posterior tibial pulses are 1+ on the right side and 1+ on the left side.      Heart sounds: Normal heart sounds. No murmur (Rate is 78) heard.  Pulmonary:      Effort: Pulmonary effort is normal.      Breath sounds: Normal breath sounds.   Abdominal:      General: Abdomen is flat. There is no distension.      Palpations: Abdomen is soft. There is no mass.      Tenderness: There is no abdominal tenderness.   Musculoskeletal:         General: No swelling.      Cervical back: Normal range of motion.      Right lower leg: No edema.      Left lower leg: No edema.      Right foot: No deformity.      Left foot: No deformity.   Feet:      Right foot:      Protective Sensation: 8 sites tested.  8 sites sensed.      Skin integrity: Skin integrity normal.      Left foot:      Protective Sensation: 8 sites tested.  8 sites sensed.      Skin integrity: Skin integrity normal.   Lymphadenopathy:      Cervical: No cervical adenopathy.   Skin:     General: Skin is warm and dry.      Capillary Refill: Capillary refill takes 2 to 3 seconds.      Findings: No rash.   Neurological:      General: No focal deficit present.      Mental Status: She is alert and oriented to person, place, and time.      Cranial Nerves: No cranial nerve deficit.      Sensory: No sensory deficit.      Motor: No weakness.      Coordination: Coordination normal.      Deep Tendon Reflexes: Reflexes normal.   Psychiatric:         Mood and Affect: Mood normal.         Behavior: Behavior normal.         Thought Content:  Thought content normal.         Judgment: Judgment normal.          Vikash Zurita MD

## 2024-03-24 DIAGNOSIS — F33.42 RECURRENT MAJOR DEPRESSIVE DISORDER, IN FULL REMISSION (HCC): ICD-10-CM

## 2024-03-24 RX ORDER — CITALOPRAM 20 MG/1
20 TABLET ORAL DAILY
Qty: 90 TABLET | Refills: 1 | Status: SHIPPED | OUTPATIENT
Start: 2024-03-24

## 2024-04-16 DIAGNOSIS — I10 ESSENTIAL HYPERTENSION: Chronic | ICD-10-CM

## 2024-04-16 RX ORDER — LOSARTAN POTASSIUM 100 MG/1
100 TABLET ORAL DAILY
Qty: 90 TABLET | Refills: 1 | Status: SHIPPED | OUTPATIENT
Start: 2024-04-16

## 2024-04-16 NOTE — TELEPHONE ENCOUNTER
Reason for call:   [x] Refill   [] Prior Auth  [x] Other:     Patient called for refill of her losartan, advised patient [facility] pharmacy sent a request over for refill, patient state she has just about enough for 3D and she would like to have this filled foro  today because she has to go into that area today. Patient advised I can send the request for approval at  but cannot guarantee it will be done today. Patient verbalized understanding.

## 2024-05-15 ENCOUNTER — TELEPHONE (OUTPATIENT)
Age: 69
End: 2024-05-15

## 2024-05-15 NOTE — TELEPHONE ENCOUNTER
LMTCB to schedule Patient would like a script to check potassium also. She is going to have her labs drawn tomorrow.

## 2024-05-16 ENCOUNTER — APPOINTMENT (OUTPATIENT)
Dept: LAB | Facility: CLINIC | Age: 69
End: 2024-05-16
Payer: MEDICARE

## 2024-05-16 DIAGNOSIS — I10 ESSENTIAL HYPERTENSION: Chronic | ICD-10-CM

## 2024-05-16 DIAGNOSIS — E78.00 HYPERCHOLESTEROLEMIA: Chronic | ICD-10-CM

## 2024-05-16 LAB
ALBUMIN SERPL BCP-MCNC: 4.7 G/DL (ref 3.5–5)
ANION GAP SERPL CALCULATED.3IONS-SCNC: 9 MMOL/L (ref 4–13)
BUN SERPL-MCNC: 17 MG/DL (ref 5–25)
CALCIUM SERPL-MCNC: 9.6 MG/DL (ref 8.4–10.2)
CHLORIDE SERPL-SCNC: 99 MMOL/L (ref 96–108)
CHOLEST SERPL-MCNC: 232 MG/DL
CO2 SERPL-SCNC: 31 MMOL/L (ref 21–32)
CREAT SERPL-MCNC: 0.57 MG/DL (ref 0.6–1.3)
GFR SERPL CREATININE-BSD FRML MDRD: 96 ML/MIN/1.73SQ M
GLUCOSE P FAST SERPL-MCNC: 88 MG/DL (ref 65–99)
HDLC SERPL-MCNC: 49 MG/DL
LDLC SERPL CALC-MCNC: 140 MG/DL (ref 0–100)
NONHDLC SERPL-MCNC: 183 MG/DL
PHOSPHATE SERPL-MCNC: 3.8 MG/DL (ref 2.3–4.1)
POTASSIUM SERPL-SCNC: 4.1 MMOL/L (ref 3.5–5.3)
SODIUM SERPL-SCNC: 139 MMOL/L (ref 135–147)
TRIGL SERPL-MCNC: 217 MG/DL

## 2024-05-16 PROCEDURE — 36415 COLL VENOUS BLD VENIPUNCTURE: CPT

## 2024-05-16 PROCEDURE — 80061 LIPID PANEL: CPT

## 2024-05-16 PROCEDURE — 80069 RENAL FUNCTION PANEL: CPT

## 2024-06-14 ENCOUNTER — OFFICE VISIT (OUTPATIENT)
Dept: FAMILY MEDICINE CLINIC | Facility: CLINIC | Age: 69
End: 2024-06-14
Payer: MEDICARE

## 2024-06-14 VITALS
HEART RATE: 64 BPM | OXYGEN SATURATION: 94 % | BODY MASS INDEX: 28.35 KG/M2 | HEIGHT: 63 IN | SYSTOLIC BLOOD PRESSURE: 122 MMHG | WEIGHT: 160 LBS | DIASTOLIC BLOOD PRESSURE: 74 MMHG

## 2024-06-14 DIAGNOSIS — I10 ESSENTIAL HYPERTENSION: Primary | Chronic | ICD-10-CM

## 2024-06-14 DIAGNOSIS — Z78.0 ASYMPTOMATIC POSTMENOPAUSAL STATE: ICD-10-CM

## 2024-06-14 DIAGNOSIS — E78.00 HYPERCHOLESTEROLEMIA: Chronic | ICD-10-CM

## 2024-06-14 DIAGNOSIS — N39.3 STRESS INCONTINENCE OF URINE: Chronic | ICD-10-CM

## 2024-06-14 DIAGNOSIS — K21.00 GASTROESOPHAGEAL REFLUX DISEASE WITH ESOPHAGITIS WITHOUT HEMORRHAGE: Chronic | ICD-10-CM

## 2024-06-14 DIAGNOSIS — R07.82 INTERCOSTAL PAIN: ICD-10-CM

## 2024-06-14 DIAGNOSIS — F41.1 GENERALIZED ANXIETY DISORDER: Chronic | ICD-10-CM

## 2024-06-14 DIAGNOSIS — M85.89 OSTEOPENIA OF MULTIPLE SITES: Chronic | ICD-10-CM

## 2024-06-14 PROCEDURE — G2211 COMPLEX E/M VISIT ADD ON: HCPCS | Performed by: FAMILY MEDICINE

## 2024-06-14 PROCEDURE — 99214 OFFICE O/P EST MOD 30 MIN: CPT | Performed by: FAMILY MEDICINE

## 2024-06-14 RX ORDER — CYCLOBENZAPRINE HCL 10 MG
10 TABLET ORAL 3 TIMES DAILY PRN
Qty: 15 TABLET | Refills: 1 | Status: SHIPPED | OUTPATIENT
Start: 2024-06-14

## 2024-06-14 NOTE — PROGRESS NOTES
Ambulatory Visit  Name: Danita Davis      : 1955      MRN: [id number]  Encounter Provider: Vikash Zurita MD  Encounter Date: 2024   Encounter department: [facility]    Assessment & Plan   1. Essential hypertension  Assessment & Plan:  Overall stable, continue current regimen  2. Asymptomatic postmenopausal state  3. Intercostal pain  -     cyclobenzaprine (FLEXERIL) 10 mg tablet; Take 1 tablet (10 mg total) by mouth 3 (three) times a day as needed for muscle spasms  4. Stress incontinence of urine  Assessment & Plan:  Doing better.  Continue to do Kegel exercises on a regular basis  5. Gastroesophageal reflux disease with esophagitis without hemorrhage  Assessment & Plan:  Asymptomatic, continue current regimen  6. Generalized anxiety disorder  Assessment & Plan:  Seems to be doing well, continue current regimen  7. Osteopenia of multiple sites  Assessment & Plan:  Continue current supplements  8. Hypercholesterolemia  Assessment & Plan:  Mild elevations.  Push fiber in diet           History of Present Illness       Patient has history of hypertension, reflux esophagitis, allergic rhinitis, urinary incontinence, stress disorder and hypercholesterolemia.  Overall has been feeling well      Review of Systems   Constitutional:  Negative for activity change, appetite change, chills, fatigue, fever and unexpected weight change.   HENT:  Negative for congestion, rhinorrhea and trouble swallowing.    Eyes:  Positive for visual disturbance (Is following up with ophthalmology for macular problem in the left eye).   Respiratory:  Negative for apnea, cough, chest tightness and shortness of breath.    Cardiovascular:  Negative for chest pain, palpitations and leg swelling.   Gastrointestinal:  Negative for abdominal distention, abdominal pain, constipation and diarrhea.   Endocrine: Positive for polyuria (Nocturia x 2-3).   Genitourinary:  Negative for enuresis.    Musculoskeletal:  Positive for arthralgias. Negative for myalgias.   Skin:  Negative for rash.   Allergic/Immunologic: Positive for environmental allergies.   Neurological:  Negative for dizziness, weakness, light-headedness, numbness and headaches.   Hematological:  Negative for adenopathy.   Psychiatric/Behavioral:  Negative for agitation and sleep disturbance.      Past Medical History:   Diagnosis Date    Allergic Young child    Allergic rhinitis     Functional disorder of bladder     GERD (gastroesophageal reflux disease)     Hypertension     Otitis media Child    Urinary tract infection 1970's    Visual impairment 2019    Age related macular degeneration     Past Surgical History:   Procedure Laterality Date    HYSTERECTOMY  04/23/2019    total    TUBAL LIGATION  Not sure     Family History   Problem Relation Age of Onset    Breast cancer Maternal Aunt         approx 70 yrs of age     No Known Problems Mother     No Known Problems Father     No Known Problems Daughter     No Known Problems Maternal Grandmother     No Known Problems Maternal Grandfather     Emphysema Paternal Grandmother     Arthritis Paternal Grandmother     No Known Problems Paternal Grandfather     Breast cancer Paternal Aunt     No Known Problems Paternal Aunt     COPD Son      Social History     Tobacco Use    Smoking status: Every Day     Current packs/day: 1.00     Average packs/day: 1 pack/day for 42.0 years (42.0 total pack years)     Types: Cigarettes    Smokeless tobacco: Never   Vaping Use    Vaping status: Never Used   Substance and Sexual Activity    Alcohol use: Yes     Comment: Occasionally     Drug use: Never    Sexual activity: Yes     Partners: Male     Comment: Birth control pill, tubes tied     Current Outpatient Medications on File Prior to Visit   Medication Sig    CALCIUM CARBONATE-VITAMIN D PO Take 1 tablet by mouth 2 (two) times a day    citalopram (CeleXA) 20 mg tablet Take 1 tablet (20 mg total) by mouth daily     "losartan (COZAAR) 100 MG tablet TAKE 1 TABLET BY MOUTH EVERY DAY    meloxicam (MOBIC) 15 mg tablet Take 1 tablet (15 mg total) by mouth daily    Multiple Vitamins-Minerals (PRESERVISION AREDS 2 PO) Take by mouth    multivitamin (THERAGRAN) TABS Take 1 tablet by mouth daily    omeprazole (PriLOSEC OTC) 20 MG tablet Take 20 mg by mouth daily    psyllium (METAMUCIL) 58.6 % packet Take 1 packet by mouth 3 (three) times a day     sodium chloride (OCEAN) 0.65 % nasal spray 1 spray into each nostril as needed for congestion    triamterene-hydrochlorothiazide (MAXZIDE) 75-50 MG per tablet TAKE 1 TABLET BY MOUTH EVERY DAY    [DISCONTINUED] cyclobenzaprine (FLEXERIL) 10 mg tablet Take 1 tablet (10 mg total) by mouth 3 (three) times a day as needed for muscle spasms     No Known Allergies  Immunization History   Administered Date(s) Administered    COVID-19 MODERNA VACC 0.5 ML IM 03/21/2021, 04/21/2021, 10/28/2021, 04/12/2022    COVID-19 Moderna Vac BIVALENT 12 Yr+ IM 0.5 ML 11/03/2022, 05/26/2023    COVID-19 Moderna mRNA Vaccine 12 Yr+ 50 mcg/0.5 mL (Spikevax) 11/24/2023    INFLUENZA 01/05/2009, 10/01/2009, 11/08/2010, 09/16/2011, 08/29/2013, 09/11/2013, 10/10/2014, 10/02/2015, 09/30/2016, 09/08/2017, 09/07/2018, 09/13/2019, 08/24/2021, 09/08/2022, 09/19/2023    Influenza, injectable, quadrivalent, preservative free 0.5 mL 09/07/2018, 09/13/2019, 09/04/2020    Influenza, seasonal, injectable 01/04/2009, 10/01/2009, 11/07/2010, 09/16/2011, 10/02/2015, 09/30/2016    Influenza, seasonal, injectable, preservative free 08/29/2013    Pneumococcal Polysaccharide PPV23 09/14/2021    Tdap 05/07/2014    Zoster Vaccine Recombinant 11/26/2018, 03/27/2019     Objective     Blood Pressure 122/74 (BP Location: Left arm, Patient Position: Sitting, Cuff Size: Standard)   Pulse 64   Height 5' 3\" (1.6 m)   Weight 72.6 kg (160 lb)   Oxygen Saturation 94%   Body Mass Index 28.34 kg/m²     Physical Exam  Vitals and nursing note reviewed. "   Constitutional:       Appearance: Normal appearance. She is not ill-appearing.   HENT:      Head: Normocephalic.      Nose: Nose normal.   Eyes:      Conjunctiva/sclera: Conjunctivae normal.   Neck:      Vascular: No carotid bruit.   Cardiovascular:      Rate and Rhythm: Normal rate and regular rhythm.      Pulses: Normal pulses.      Heart sounds: Normal heart sounds. No murmur (Rate is 72) heard.  Pulmonary:      Effort: Pulmonary effort is normal.      Breath sounds: Normal breath sounds.   Abdominal:      General: Abdomen is flat. There is no distension.      Palpations: Abdomen is soft. There is no mass.      Tenderness: There is no abdominal tenderness.   Musculoskeletal:         General: No swelling.      Cervical back: Normal range of motion. No tenderness.      Right lower leg: No edema.      Left lower leg: No edema.   Lymphadenopathy:      Cervical: No cervical adenopathy.   Skin:     General: Skin is warm and dry.      Findings: No rash.   Neurological:      Mental Status: She is alert.      Motor: No weakness.      Coordination: Coordination normal.      Gait: Gait normal.      Deep Tendon Reflexes: Reflexes normal.   Psychiatric:         Mood and Affect: Mood normal.         Behavior: Behavior normal.         Thought Content: Thought content normal.         Judgment: Judgment normal.       Administrative Statements

## 2024-06-14 NOTE — PATIENT INSTRUCTIONS
Is having problems with the vision in the left eye with the macula and is following up with ophthalmology.  Otherwise seems to be doing well.  Try to push daily physical activity and get to walking in in the morning.  Will continue all meds as  is

## 2024-06-23 DIAGNOSIS — I10 ESSENTIAL HYPERTENSION: ICD-10-CM

## 2024-06-23 RX ORDER — TRIAMTERENE AND HYDROCHLOROTHIAZIDE 75; 50 MG/1; MG/1
TABLET ORAL
Qty: 90 TABLET | Refills: 1 | Status: SHIPPED | OUTPATIENT
Start: 2024-06-23

## 2024-06-24 DIAGNOSIS — M25.562 CHRONIC PAIN OF BOTH KNEES: ICD-10-CM

## 2024-06-24 DIAGNOSIS — M25.561 CHRONIC PAIN OF BOTH KNEES: ICD-10-CM

## 2024-06-24 DIAGNOSIS — G89.29 CHRONIC PAIN OF BOTH KNEES: ICD-10-CM

## 2024-06-25 DIAGNOSIS — M25.562 CHRONIC PAIN OF BOTH KNEES: ICD-10-CM

## 2024-06-25 DIAGNOSIS — G89.29 CHRONIC PAIN OF BOTH KNEES: ICD-10-CM

## 2024-06-25 DIAGNOSIS — M25.561 CHRONIC PAIN OF BOTH KNEES: ICD-10-CM

## 2024-06-25 RX ORDER — MELOXICAM 15 MG/1
15 TABLET ORAL DAILY
Qty: 30 TABLET | Refills: 5 | Status: SHIPPED | OUTPATIENT
Start: 2024-06-25

## 2024-06-25 RX ORDER — MELOXICAM 15 MG/1
15 TABLET ORAL DAILY
Qty: 30 TABLET | Refills: 0 | Status: SHIPPED | OUTPATIENT
Start: 2024-06-25 | End: 2024-06-25 | Stop reason: SDUPTHER

## 2024-10-11 DIAGNOSIS — I10 ESSENTIAL HYPERTENSION: Chronic | ICD-10-CM

## 2024-10-11 RX ORDER — LOSARTAN POTASSIUM 100 MG/1
100 TABLET ORAL DAILY
Qty: 90 TABLET | Refills: 1 | Status: SHIPPED | OUTPATIENT
Start: 2024-10-11

## 2024-10-16 ENCOUNTER — OFFICE VISIT (OUTPATIENT)
Dept: FAMILY MEDICINE CLINIC | Facility: CLINIC | Age: 69
End: 2024-10-16
Payer: MEDICARE

## 2024-10-16 VITALS
SYSTOLIC BLOOD PRESSURE: 130 MMHG | WEIGHT: 163 LBS | BODY MASS INDEX: 28.88 KG/M2 | DIASTOLIC BLOOD PRESSURE: 62 MMHG | HEIGHT: 63 IN | HEART RATE: 68 BPM

## 2024-10-16 DIAGNOSIS — I10 ESSENTIAL HYPERTENSION: Chronic | ICD-10-CM

## 2024-10-16 DIAGNOSIS — K21.00 GASTROESOPHAGEAL REFLUX DISEASE WITH ESOPHAGITIS WITHOUT HEMORRHAGE: Primary | Chronic | ICD-10-CM

## 2024-10-16 DIAGNOSIS — F41.1 GENERALIZED ANXIETY DISORDER: Chronic | ICD-10-CM

## 2024-10-16 DIAGNOSIS — N39.3 STRESS INCONTINENCE OF URINE: Chronic | ICD-10-CM

## 2024-10-16 DIAGNOSIS — J30.1 SEASONAL ALLERGIC RHINITIS DUE TO POLLEN: Chronic | ICD-10-CM

## 2024-10-16 PROCEDURE — G2211 COMPLEX E/M VISIT ADD ON: HCPCS | Performed by: FAMILY MEDICINE

## 2024-10-16 PROCEDURE — 99214 OFFICE O/P EST MOD 30 MIN: CPT | Performed by: FAMILY MEDICINE

## 2024-10-16 NOTE — PROGRESS NOTES
Ambulatory Visit  Name: Dantia Davis      : 1955      MRN: [id number]  Encounter Provider: Vikash Zurita MD  Encounter Date: 10/16/2024   Encounter department: [facility]IMARY [facility]    Assessment & Plan  Gastroesophageal reflux disease with esophagitis without hemorrhage  Asymptomatic, continue current regimen       Essential hypertension  Overall doing well, continue current regimen       Generalized anxiety disorder  Seems to be doing well, continue current regimen       Seasonal allergic rhinitis due to pollen  Doing well, continue as needed meds       Stress incontinence of urine  Doing well, continue to do the Kegel exercises on a regular basis            History of Present Illness       Patient has history of hypertension, reflux esophagitis, allergic rhinitis, urinary incontinence, stress disorder and hypercholesterolemia.  Overall has been feeling well      Review of Systems   Constitutional:  Negative for chills and fever.   HENT:  Negative for congestion and trouble swallowing.    Eyes:  Negative for pain and visual disturbance.   Respiratory:  Negative for cough, chest tightness and shortness of breath.    Cardiovascular:  Negative for chest pain, palpitations and leg swelling.   Gastrointestinal:  Negative for abdominal pain, constipation, diarrhea and vomiting.   Endocrine: Positive for polyuria.   Genitourinary:  Negative for enuresis.   Musculoskeletal:  Negative for arthralgias.   Skin:  Negative for color change and rash.   Allergic/Immunologic: Positive for environmental allergies.   Neurological:  Negative for seizures and syncope.   Psychiatric/Behavioral:  Negative for agitation and sleep disturbance.    All other systems reviewed and are negative.    Past Medical History:   Diagnosis Date    Allergic Young child    Allergic rhinitis     Functional disorder of bladder     GERD (gastroesophageal reflux disease)     Hypertension     Otitis media Child     Urinary tract infection 1970's    Visual impairment 2019    Age related macular degeneration     Past Surgical History:   Procedure Laterality Date    HYSTERECTOMY  04/23/2019    total    TUBAL LIGATION  Not sure     Family History   Problem Relation Age of Onset    Breast cancer Maternal Aunt         approx 70 yrs of age     No Known Problems Mother     No Known Problems Father     No Known Problems Daughter     No Known Problems Maternal Grandmother     No Known Problems Maternal Grandfather     Emphysema Paternal Grandmother     Arthritis Paternal Grandmother     No Known Problems Paternal Grandfather     Breast cancer Paternal Aunt     No Known Problems Paternal Aunt     COPD Son      Social History     Tobacco Use    Smoking status: Every Day     Current packs/day: 1.00     Average packs/day: 1 pack/day for 42.0 years (42.0 total pack years)     Types: Cigarettes    Smokeless tobacco: Never   Vaping Use    Vaping status: Never Used   Substance and Sexual Activity    Alcohol use: Yes     Comment: Occasionally     Drug use: Never    Sexual activity: Yes     Partners: Male     Comment: Birth control pill, tubes tied     Current Outpatient Medications on File Prior to Visit   Medication Sig    CALCIUM CARBONATE-VITAMIN D PO Take 1 tablet by mouth 2 (two) times a day    citalopram (CeleXA) 20 mg tablet Take 1 tablet (20 mg total) by mouth daily    cyclobenzaprine (FLEXERIL) 10 mg tablet Take 1 tablet (10 mg total) by mouth 3 (three) times a day as needed for muscle spasms    losartan (COZAAR) 100 MG tablet TAKE 1 TABLET BY MOUTH EVERY DAY    meloxicam (MOBIC) 15 mg tablet Take 1 tablet (15 mg total) by mouth daily    Multiple Vitamins-Minerals (PRESERVISION AREDS 2 PO) Take by mouth    multivitamin (THERAGRAN) TABS Take 1 tablet by mouth daily    omeprazole (PriLOSEC OTC) 20 MG tablet Take 20 mg by mouth daily    psyllium (METAMUCIL) 58.6 % packet Take 1 packet by mouth 3 (three) times a day     sodium chloride  "(OCEAN) 0.65 % nasal spray 1 spray into each nostril as needed for congestion    triamterene-hydrochlorothiazide (MAXZIDE) 75-50 MG per tablet TAKE 1 TABLET BY MOUTH EVERY DAY     No Known Allergies  Immunization History   Administered Date(s) Administered    COVID-19 MODERNA VACC 0.5 ML IM 03/21/2021, 04/21/2021, 10/28/2021, 04/12/2022    COVID-19 Moderna Vac BIVALENT 12 Yr+ IM 0.5 ML 11/03/2022, 05/26/2023    COVID-19 Moderna mRNA Vaccine 12 Yr+ 50 mcg/0.5 mL (Spikevax) 11/24/2023    INFLUENZA 01/05/2009, 10/01/2009, 11/08/2010, 09/16/2011, 08/29/2013, 09/11/2013, 10/10/2014, 10/02/2015, 09/30/2016, 09/08/2017, 09/07/2018, 09/13/2019, 08/24/2021, 09/08/2022, 09/19/2023    Influenza, injectable, quadrivalent, preservative free 0.5 mL 09/07/2018, 09/13/2019, 09/04/2020    Influenza, seasonal, injectable 01/04/2009, 10/01/2009, 11/07/2010, 09/16/2011, 10/02/2015, 09/30/2016    Influenza, seasonal, injectable, preservative free 08/29/2013    Pneumococcal Polysaccharide PPV23 09/14/2021    Tdap 05/07/2014    Zoster Vaccine Recombinant 11/26/2018, 03/27/2019     Objective     Blood Pressure 130/62 (BP Location: Left arm, Patient Position: Sitting, Cuff Size: Standard)   Pulse 68   Height 5' 3\" (1.6 m)   Weight 73.9 kg (163 lb)   Body Mass Index 28.87 kg/m²     Physical Exam  Vitals and nursing note reviewed.   Constitutional:       General: She is not in acute distress.     Appearance: Normal appearance. She is well-developed.   HENT:      Head: Normocephalic and atraumatic.      Nose: Nose normal.   Eyes:      Conjunctiva/sclera: Conjunctivae normal.   Neck:      Vascular: No carotid bruit.   Cardiovascular:      Rate and Rhythm: Normal rate and regular rhythm.      Heart sounds: No murmur (Rate is 66) heard.  Pulmonary:      Effort: Pulmonary effort is normal. No respiratory distress.      Breath sounds: Normal breath sounds.   Abdominal:      General: Abdomen is flat.      Palpations: Abdomen is soft. There is no " mass.      Tenderness: There is no abdominal tenderness.   Musculoskeletal:         General: No swelling.      Cervical back: Neck supple. No tenderness.      Right lower leg: Edema present.      Left lower leg: No edema.   Lymphadenopathy:      Cervical: No cervical adenopathy.   Skin:     General: Skin is warm and dry.      Capillary Refill: Capillary refill takes less than 2 seconds.      Findings: No rash.   Neurological:      Mental Status: She is alert.      Motor: No weakness.      Coordination: Coordination normal.      Gait: Gait normal.      Deep Tendon Reflexes: Reflexes normal.   Psychiatric:         Mood and Affect: Mood normal.

## 2024-10-16 NOTE — PATIENT INSTRUCTIONS
Overall exam today looks good.  Does have a ganglion cyst on the top of the right wrist.  Just avoid pressure on it and I do not feel needs to do anything about it at this time but if becomes repeatedly sore and enlarges would need to consider excision.  I but I think he can.  Continue current meds

## 2024-11-17 DIAGNOSIS — F33.42 RECURRENT MAJOR DEPRESSIVE DISORDER, IN FULL REMISSION (HCC): ICD-10-CM

## 2024-11-18 RX ORDER — CITALOPRAM HYDROBROMIDE 20 MG/1
20 TABLET ORAL DAILY
Qty: 90 TABLET | Refills: 1 | Status: SHIPPED | OUTPATIENT
Start: 2024-11-18

## 2024-12-18 DIAGNOSIS — I10 ESSENTIAL HYPERTENSION: ICD-10-CM

## 2024-12-18 RX ORDER — TRIAMTERENE AND HYDROCHLOROTHIAZIDE 75; 50 MG/1; MG/1
1 TABLET ORAL DAILY
Qty: 90 TABLET | Refills: 1 | Status: SHIPPED | OUTPATIENT
Start: 2024-12-18

## 2025-01-08 DIAGNOSIS — M25.561 CHRONIC PAIN OF BOTH KNEES: ICD-10-CM

## 2025-01-08 DIAGNOSIS — G89.29 CHRONIC PAIN OF BOTH KNEES: ICD-10-CM

## 2025-01-08 DIAGNOSIS — M25.562 CHRONIC PAIN OF BOTH KNEES: ICD-10-CM

## 2025-01-09 DIAGNOSIS — M25.562 CHRONIC PAIN OF BOTH KNEES: ICD-10-CM

## 2025-01-09 DIAGNOSIS — G89.29 CHRONIC PAIN OF BOTH KNEES: ICD-10-CM

## 2025-01-09 DIAGNOSIS — M25.561 CHRONIC PAIN OF BOTH KNEES: ICD-10-CM

## 2025-01-09 RX ORDER — MELOXICAM 15 MG/1
15 TABLET ORAL DAILY
Qty: 30 TABLET | Refills: 0 | Status: SHIPPED | OUTPATIENT
Start: 2025-01-09 | End: 2025-01-09 | Stop reason: SDUPTHER

## 2025-01-09 RX ORDER — MELOXICAM 15 MG/1
15 TABLET ORAL DAILY
Qty: 30 TABLET | Refills: 5 | Status: SHIPPED | OUTPATIENT
Start: 2025-01-09

## 2025-02-12 ENCOUNTER — RA CDI HCC (OUTPATIENT)
Dept: OTHER | Facility: HOSPITAL | Age: 70
End: 2025-02-12

## 2025-02-19 ENCOUNTER — OFFICE VISIT (OUTPATIENT)
Dept: FAMILY MEDICINE CLINIC | Facility: CLINIC | Age: 70
End: 2025-02-19
Payer: MEDICARE

## 2025-02-19 VITALS
SYSTOLIC BLOOD PRESSURE: 124 MMHG | HEIGHT: 63 IN | WEIGHT: 165 LBS | BODY MASS INDEX: 29.23 KG/M2 | DIASTOLIC BLOOD PRESSURE: 70 MMHG

## 2025-02-19 DIAGNOSIS — F41.1 GENERALIZED ANXIETY DISORDER: Chronic | ICD-10-CM

## 2025-02-19 DIAGNOSIS — Z00.00 MEDICARE ANNUAL WELLNESS VISIT, SUBSEQUENT: ICD-10-CM

## 2025-02-19 DIAGNOSIS — K21.00 GASTROESOPHAGEAL REFLUX DISEASE WITH ESOPHAGITIS WITHOUT HEMORRHAGE: Chronic | ICD-10-CM

## 2025-02-19 DIAGNOSIS — I10 ESSENTIAL HYPERTENSION: Primary | Chronic | ICD-10-CM

## 2025-02-19 DIAGNOSIS — J30.1 SEASONAL ALLERGIC RHINITIS DUE TO POLLEN: Chronic | ICD-10-CM

## 2025-02-19 DIAGNOSIS — M85.89 OSTEOPENIA OF MULTIPLE SITES: Chronic | ICD-10-CM

## 2025-02-19 DIAGNOSIS — N39.3 STRESS INCONTINENCE OF URINE: Chronic | ICD-10-CM

## 2025-02-19 DIAGNOSIS — E78.00 HYPERCHOLESTEROLEMIA: Chronic | ICD-10-CM

## 2025-02-19 PROCEDURE — G0444 DEPRESSION SCREEN ANNUAL: HCPCS | Performed by: FAMILY MEDICINE

## 2025-02-19 PROCEDURE — 99214 OFFICE O/P EST MOD 30 MIN: CPT | Performed by: FAMILY MEDICINE

## 2025-02-19 PROCEDURE — G0439 PPPS, SUBSEQ VISIT: HCPCS | Performed by: FAMILY MEDICINE

## 2025-02-19 NOTE — PROGRESS NOTES
Name: Danita Davis      : 1955      MRN: [id number]  Encounter Provider: Vikash Zurita MD  Encounter Date: 2025   Encounter department: [facility] PRIMARY [facility]    Assessment & Plan  Hypercholesterolemia  Continue to push fiber and activity.  Will check lipid panel  Orders:    Lipid panel; Future    Essential hypertension  Overall stable, continue current regimen  Orders:    Renal function panel; Future    Generalized anxiety disorder  Seems to be doing well, continue current regimen       Osteopenia of multiple sites  Continue current supplements and push walking activity       Gastroesophageal reflux disease with esophagitis without hemorrhage  Asymptomatic, continue current regimen       Seasonal allergic rhinitis due to pollen  Doing well, continue as needed over-the-counter meds       Stress incontinence of urine  Do Kegel exercises on a regular basis       Medicare annual wellness visit, subsequent           Depression Screening and Follow-up Plan: Patient was screened for depression during today's encounter. They screened negative with a PHQ-2 score of 0.      Urinary Incontinence Plan of Care: counseling topics discussed: practice Kegel (pelvic floor strengthening) exercises.     Tobacco Cessation Counseling: Tobacco cessation counseling was provided. The patient is sincerely urged to quit consumption of tobacco. She is not ready to quit tobacco. Medication options not discussed.       Preventive health issues were discussed with patient, and age appropriate screening tests were ordered as noted in patient's After Visit Summary. Personalized health advice and appropriate referrals for health education or preventive services given if needed, as noted in patient's After Visit Summary.    History of Present Illness       Patient has history of hypertension, reflux esophagitis, allergic rhinitis, urinary incontinence, stress disorder and hypercholesterolemia.  Overall  has been feeling well.  Seen also for wellness check today       Patient Care Team:  Vikash Zurita MD as PCP - General (Family Medicine)    Review of Systems   Constitutional:  Negative for activity change, appetite change, chills, fatigue, fever and unexpected weight change.   HENT:  Negative for congestion, dental problem, hearing loss, rhinorrhea and trouble swallowing.    Eyes:  Negative for visual disturbance.   Respiratory:  Negative for apnea, cough, chest tightness and shortness of breath.    Cardiovascular:  Negative for chest pain, palpitations and leg swelling.   Gastrointestinal:  Negative for abdominal distention, abdominal pain, constipation and diarrhea.   Endocrine: Positive for polyuria (Nocturia x 2-3).   Genitourinary:  Positive for enuresis. Negative for difficulty urinating.   Musculoskeletal:  Negative for arthralgias and myalgias.   Skin:  Negative for rash.   Allergic/Immunologic: Positive for environmental allergies.   Neurological:  Negative for dizziness, weakness, light-headedness, numbness and headaches.   Hematological:  Negative for adenopathy.   Psychiatric/Behavioral:  Negative for agitation, dysphoric mood and sleep disturbance.      Medical History Reviewed by provider this encounter:  Tobacco  Allergies  Meds  Problems  Med Hx  Surg Hx  Fam Hx       Annual Wellness Visit Questionnaire   Danita is here for her Subsequent Wellness visit. Last Medicare Wellness visit information reviewed, patient interviewed and updates made to the record today.      Health Risk Assessment:   Patient rates overall health as good. Patient feels that their physical health rating is same. Patient is satisfied with their life. Eyesight was rated as same. Hearing was rated as same. Patient feels that their emotional and mental health rating is same. Patients states they are never, rarely angry. Patient states they are sometimes unusually tired/fatigued. Pain experienced in the last 7 days  has been none. Patient states that she has experienced no weight loss or gain in last 6 months. No issues    Depression Screening:   PHQ-2 Score: 0      Fall Risk Screening:   In the past year, patient has experienced: no history of falling in past year      Urinary Incontinence Screening:   Patient has leaked urine accidently in the last six months. Be sure to do Kegel exercises on a regular basis    Home Safety:  Patient does not have trouble with stairs inside or outside of their home. Patient has working smoke alarms and has working carbon monoxide detector. Home safety hazards include: none. I did advise grab bars in the bathroom    Nutrition:   Current diet is Regular. Watch starch in diet    Medications:   Patient is currently taking over-the-counter supplements. OTC medications include: see medication list. Patient is able to manage medications. No issues    Activities of Daily Living (ADLs)/Instrumental Activities of Daily Living (IADLs):   Walk and transfer into and out of bed and chair?: Yes  Dress and groom yourself?: Yes    Bathe or shower yourself?: Yes    Feed yourself? Yes  Do your laundry/housekeeping?: Yes  Manage your money, pay your bills and track your expenses?: Yes  Make your own meals?: Yes    Do your own shopping?: Yes    ADL comments: Overall patient is very functional around the house.  No issues    Previous Hospitalizations:   Any hospitalizations or ED visits within the last 12 months?: No      Hospitalization Comments: Patient follows up with [facility] for gynecology    Advance Care Planning:   Living will: Yes    Durable POA for healthcare: Yes    Advanced directive: Yes    Advanced directive counseling given: Yes    Five wishes given: No    End of Life Decisions reviewed with patient: No      Comments: Should bring into office to have scanned into the chart    Cognitive Screening:   Provider or family/friend/caregiver concerned regarding cognition?: No    PREVENTIVE SCREENINGS      psychosis  Cardiovascular Screening:    General: Screening Not Indicated and History Lipid Disorder    Due for: Lipid Panel      Diabetes Screening:     General: Screening Current    Due for: Blood Glucose      Colorectal Cancer Screening:     General: Screening Current      Breast Cancer Screening:     General: Screening Current      Cervical Cancer Screening:    General: Screening Not Indicated      Osteoporosis Screening:    General: Screening Current      Abdominal Aortic Aneurysm (AAA) Screening:        General: Screening Not Indicated      Lung Cancer Screening:     General: Screening Not Indicated      Hepatitis C Screening:    General: Screening Not Indicated      Preventive Screening Comments: Continue follow-up with routine eye care and have reports forwarded to office    Cardiovascular Risk Assessment:  Patient does not have underlying ASCVD and their cardiovascular risk was assessed today. Their cardiovascular risk factors include: hypertension, hyperlipidemia and overweight/obesity.     The 10-year ASCVD risk score (Malcolm EWING, et al., 2019) is: 17.5%    Values used to calculate the score:      Age: 69 years      Sex: Female      Is Non- : No      Diabetic: No      Tobacco smoker: Yes      Systolic Blood Pressure: 124 mmHg      Is BP treated: Yes      HDL Cholesterol: 49 mg/dL      Total Cholesterol: 232 mg/dL    Screening, Brief Intervention, and Referral to Treatment (SBIRT)     Screening  Typical number of drinks in a day: 0  Typical number of drinks in a week: 0  Interpretation: Low risk drinking behavior.    Single Item Drug Screening:  How often have you used an illegal drug (including marijuana) or a prescription medication for non-medical reasons in the past year? never    Single Item Drug Screen Score: 0  Interpretation: Negative screen for possible drug use disorder    Brief Intervention  Alcohol & drug use screenings were reviewed. No concerns regarding substance use disorder  "identified.     Annual Depression Screening  Time spent screening and evaluating the patient for depression during today's encounter was 5 minutes.    Social Drivers of Health     Financial Resource Strain: Patient Declined (2/5/2024)    Overall Financial Resource Strain (CARDIA)     Difficulty of Paying Living Expenses: Patient declined   Food Insecurity: No Food Insecurity (2/19/2025)    Hunger Vital Sign     Worried About Running Out of Food in the Last Year: Never true     Ran Out of Food in the Last Year: Never true   Transportation Needs: No Transportation Needs (2/19/2025)    PRAPARE - Transportation     Lack of Transportation (Medical): No     Lack of Transportation (Non-Medical): No   Housing Stability: Unknown (2/19/2025)    Housing Stability Vital Sign     Unable to Pay for Housing in the Last Year: No     Homeless in the Last Year: No   Utilities: Not At Risk (2/19/2025)    [facility] Utilities     Threatened with loss of utilities: No     No results found.    Objective   Blood Pressure 124/70 (BP Location: Left arm, Patient Position: Sitting, Cuff Size: Large)   Height 5' 3\" (1.6 m)   Weight 74.8 kg (165 lb)   Body Mass Index 29.23 kg/m²     Physical Exam  Vitals and nursing note reviewed.   Constitutional:       General: She is not in acute distress.     Appearance: Normal appearance. She is well-developed.   HENT:      Head: Normocephalic and atraumatic.      Right Ear: Tympanic membrane, ear canal and external ear normal. Decreased hearing (25 dB hearing screen off at 2000 Hz and 4000 Hz.  No difficulty with conversation) noted.      Left Ear: Tympanic membrane, ear canal and external ear normal. Decreased hearing (25 dB hearing screen off except at 2000 Hz) noted.      Nose: Nose normal.      Mouth/Throat:      Mouth: Mucous membranes are moist.      Dentition: Abnormal dentition (Multiple missing lower teeth). Has dentures (Upper dentures).   Eyes:      Extraocular Movements: Extraocular movements " intact.      Conjunctiva/sclera: Conjunctivae normal.      Pupils: Pupils are equal, round, and reactive to light.   Neck:      Vascular: No carotid bruit.   Cardiovascular:      Rate and Rhythm: Normal rate and regular rhythm.      Pulses:           Dorsalis pedis pulses are 1+ on the right side and 1+ on the left side.        Posterior tibial pulses are 1+ on the right side and 1+ on the left side.      Heart sounds: No murmur (Rate is 72) heard.  Pulmonary:      Effort: Pulmonary effort is normal. No respiratory distress.      Breath sounds: Normal breath sounds.   Abdominal:      General: Abdomen is flat.      Palpations: Abdomen is soft. There is no mass.      Tenderness: There is no abdominal tenderness.   Musculoskeletal:         General: No swelling.      Cervical back: Neck supple. No tenderness.      Right foot: No deformity.      Left foot: No deformity.   Feet:      Right foot:      Protective Sensation: 8 sites tested.  8 sites sensed.      Skin integrity: Skin integrity normal.      Left foot:      Protective Sensation: 8 sites tested.  8 sites sensed.      Skin integrity: Skin integrity normal.   Lymphadenopathy:      Cervical: No cervical adenopathy.   Skin:     General: Skin is warm and dry.      Capillary Refill: Capillary refill takes less than 2 seconds.      Findings: No rash.   Neurological:      General: No focal deficit present.      Mental Status: She is alert.      Cranial Nerves: No cranial nerve deficit.      Sensory: No sensory deficit.      Motor: No weakness.      Coordination: Coordination normal.      Gait: Gait normal.      Deep Tendon Reflexes: Reflexes abnormal (Reflexes 1+).   Psychiatric:         Mood and Affect: Mood normal.         Behavior: Behavior normal.         Thought Content: Thought content normal.         Judgment: Judgment normal.

## 2025-02-19 NOTE — PATIENT INSTRUCTIONS
Overall patient is very functional.  Will check renal and lipid panel for history of hypertension and hypercholesterolemia.  Should not do this until June.  Has had flu shot.  Try to keep up the daily walking activity and watch the starch in the diet.  Continue baseline meds.  Also try to work on cutting down on the smoking  
No

## 2025-02-20 PROBLEM — Z00.00 MEDICARE ANNUAL WELLNESS VISIT, SUBSEQUENT: Status: ACTIVE | Noted: 2025-02-20

## 2025-03-22 PROBLEM — Z00.00 MEDICARE ANNUAL WELLNESS VISIT, SUBSEQUENT: Status: RESOLVED | Noted: 2025-02-20 | Resolved: 2025-03-22

## 2025-04-05 DIAGNOSIS — I10 ESSENTIAL HYPERTENSION: Chronic | ICD-10-CM

## 2025-04-06 RX ORDER — LOSARTAN POTASSIUM 100 MG/1
100 TABLET ORAL DAILY
Qty: 90 TABLET | Refills: 1 | Status: SHIPPED | OUTPATIENT
Start: 2025-04-06

## 2025-05-19 ENCOUNTER — APPOINTMENT (OUTPATIENT)
Dept: LAB | Facility: CLINIC | Age: 70
End: 2025-05-19
Payer: MEDICARE

## 2025-05-19 DIAGNOSIS — E78.00 HYPERCHOLESTEROLEMIA: Chronic | ICD-10-CM

## 2025-05-19 DIAGNOSIS — I10 ESSENTIAL HYPERTENSION: Chronic | ICD-10-CM

## 2025-05-19 LAB
ALBUMIN SERPL BCG-MCNC: 4.4 G/DL (ref 3.5–5)
ANION GAP SERPL CALCULATED.3IONS-SCNC: 9 MMOL/L (ref 4–13)
BUN SERPL-MCNC: 13 MG/DL (ref 5–25)
CALCIUM SERPL-MCNC: 9.3 MG/DL (ref 8.4–10.2)
CHLORIDE SERPL-SCNC: 102 MMOL/L (ref 96–108)
CHOLEST SERPL-MCNC: 214 MG/DL (ref ?–200)
CO2 SERPL-SCNC: 28 MMOL/L (ref 21–32)
CREAT SERPL-MCNC: 0.58 MG/DL (ref 0.6–1.3)
GFR SERPL CREATININE-BSD FRML MDRD: 95 ML/MIN/1.73SQ M
GLUCOSE P FAST SERPL-MCNC: 85 MG/DL (ref 65–99)
HDLC SERPL-MCNC: 47 MG/DL
LDLC SERPL CALC-MCNC: 126 MG/DL (ref 0–100)
NONHDLC SERPL-MCNC: 167 MG/DL
PHOSPHATE SERPL-MCNC: 4 MG/DL (ref 2.3–4.1)
POTASSIUM SERPL-SCNC: 4 MMOL/L (ref 3.5–5.3)
SODIUM SERPL-SCNC: 139 MMOL/L (ref 135–147)
TRIGL SERPL-MCNC: 207 MG/DL (ref ?–150)

## 2025-05-19 PROCEDURE — 80061 LIPID PANEL: CPT

## 2025-05-19 PROCEDURE — 80069 RENAL FUNCTION PANEL: CPT

## 2025-05-19 PROCEDURE — 36415 COLL VENOUS BLD VENIPUNCTURE: CPT

## 2025-05-20 ENCOUNTER — RESULTS FOLLOW-UP (OUTPATIENT)
Dept: FAMILY MEDICINE CLINIC | Facility: CLINIC | Age: 70
End: 2025-05-20

## 2025-05-21 NOTE — TELEPHONE ENCOUNTER
----- Message from Vikash Zurita MD sent at 5/20/2025  8:40 PM EDT -----  Please call patient and inform of lab results.  I feel overall cholesterol levels and other labs are okay  ----- Message -----  From: Lab, Background User  Sent: 5/19/2025   7:33 PM EDT  To: Vikash Zurita MD

## 2025-05-21 NOTE — RESULT ENCOUNTER NOTE
Please call patient and inform of lab results.  I feel overall cholesterol levels and other labs are okay

## 2025-06-04 ENCOUNTER — RA CDI HCC (OUTPATIENT)
Dept: OTHER | Facility: HOSPITAL | Age: 70
End: 2025-06-04

## 2025-06-06 ENCOUNTER — TELEPHONE (OUTPATIENT)
Dept: ADMINISTRATIVE | Facility: OTHER | Age: 70
End: 2025-06-06

## 2025-06-06 NOTE — TELEPHONE ENCOUNTER
----- Message from Delaney MCCARTHY sent at 6/6/2025  9:09 AM EDT -----  Regarding: Quality Update/dexa  06/06/25 9:09 SAYRAmel, our patient No patient name on file. has had DEXA Scan completed/performed. Please assist in updating the patient chart by pulling the Care Everywhere (CE) document. The date of service is 4/29/25. Thank you,Delaney Braxton, DEIS[facility] [place] [facility]

## 2025-06-06 NOTE — TELEPHONE ENCOUNTER
Upon review of the In Basket request we were able to locate, review, and update the patient chart as requested for DEXA Scan.    Any additional questions or concerns should be emailed to the Practice Liaisons via the appropriate education email address, please do not reply via In Basket.    Thank you  Amy George   [facility]

## 2025-06-11 ENCOUNTER — OFFICE VISIT (OUTPATIENT)
Age: 70
End: 2025-06-11
Payer: MEDICARE

## 2025-06-11 VITALS
WEIGHT: 160 LBS | SYSTOLIC BLOOD PRESSURE: 132 MMHG | TEMPERATURE: 97.3 F | OXYGEN SATURATION: 97 % | HEART RATE: 62 BPM | HEIGHT: 63 IN | DIASTOLIC BLOOD PRESSURE: 72 MMHG | BODY MASS INDEX: 28.35 KG/M2

## 2025-06-11 DIAGNOSIS — I10 ESSENTIAL HYPERTENSION: Primary | ICD-10-CM

## 2025-06-11 DIAGNOSIS — F41.1 GENERALIZED ANXIETY DISORDER: Chronic | ICD-10-CM

## 2025-06-11 DIAGNOSIS — E78.00 HYPERCHOLESTEROLEMIA: Chronic | ICD-10-CM

## 2025-06-11 DIAGNOSIS — N39.3 STRESS INCONTINENCE OF URINE: Chronic | ICD-10-CM

## 2025-06-11 DIAGNOSIS — K21.00 GASTROESOPHAGEAL REFLUX DISEASE WITH ESOPHAGITIS WITHOUT HEMORRHAGE: Chronic | ICD-10-CM

## 2025-06-11 PROCEDURE — 99214 OFFICE O/P EST MOD 30 MIN: CPT | Performed by: FAMILY MEDICINE

## 2025-06-11 RX ORDER — TRIAMTERENE AND HYDROCHLOROTHIAZIDE 75; 50 MG/1; MG/1
1 TABLET ORAL DAILY
Qty: 90 TABLET | Refills: 1 | Status: SHIPPED | OUTPATIENT
Start: 2025-06-11

## 2025-06-11 NOTE — PATIENT INSTRUCTIONS
Overall seems to be doing well.  Is following up with gynecology for the bladder incontinence and apparently due for pelvic floor training with physical therapy.  Continue current meds

## 2025-06-11 NOTE — ASSESSMENT & PLAN NOTE
Overall stable, continue current regimen  Orders:    triamterene-hydrochlorothiazide (MAXZIDE) 75-50 MG per tablet; Take 1 tablet by mouth daily

## 2025-06-11 NOTE — PROGRESS NOTES
Name: Danita Davis      : 1955      MRN: [id number]  Encounter Provider: Vikash Zurita MD  Encounter Date: 2025   Encounter department: [facility] PRIMARY CARE    Assessment & Plan  Essential hypertension  Overall stable, continue current regimen  Orders:    triamterene-hydrochlorothiazide (MAXZIDE) 75-50 MG per tablet; Take 1 tablet by mouth daily    Gastroesophageal reflux disease with esophagitis without hemorrhage  Asymptomatic, continue current regimen       Generalized anxiety disorder  Seems to be doing well off the citalopram.  Push activity on a daily basis       Hypercholesterolemia  Levels are better, continue fiber supplement.  Reviewed labs       Stress incontinence of urine  Is going to be following up for pelvic floor training            History of Present Illness         Patient has history of hypertension, reflux esophagitis, allergic rhinitis, urinary incontinence, stress disorder and hypercholesterolemia.  Had seen gynecology about the incontinence and apparently is being set up for pelvic floor training      Review of Systems   HENT:  Negative for trouble swallowing.    Cardiovascular:  Negative for palpitations.   Gastrointestinal:  Negative for constipation and diarrhea.   Endocrine: Positive for polyuria.   Genitourinary:  Positive for enuresis.   Musculoskeletal:  Negative for arthralgias.   Neurological:  Negative for dizziness and light-headedness.   Psychiatric/Behavioral:  Positive for sleep disturbance.      Past Medical History[1]  Past Surgical History[2]  Family History[3]  Social History[4]  Medications[5]  No Known Allergies  Immunization History   Administered Date(s) Administered    COVID-19 MODERNA VACC 0.5 ML IM 2021, 2021, 10/28/2021, 2022    COVID-19 Moderna Vac BIVALENT 12 Yr+ IM 0.5 ML 2022, 2023    COVID-19 Moderna mRNA Vaccine 12 Yr+ 50 mcg/0.5 mL (Spikevax) 2023, 2024    INFLUENZA  "01/05/2009, 10/01/2009, 11/08/2010, 09/16/2011, 08/29/2013, 09/11/2013, 10/10/2014, 10/02/2015, 09/30/2016, 09/08/2017, 09/07/2018, 09/13/2019, 08/24/2021, 09/08/2022, 09/19/2023    Influenza, injectable, quadrivalent, preservative free 0.5 mL 09/07/2018, 09/13/2019, 09/04/2020    Influenza, seasonal, injectable 01/04/2009, 01/05/2009, 10/01/2009, 11/07/2010, 11/08/2010, 09/16/2011, 10/02/2015, 09/30/2016    Influenza, seasonal, injectable, preservative free 08/29/2013    Pneumococcal Polysaccharide PPV23 09/14/2021    Tdap 05/07/2014    Zoster Vaccine Recombinant 11/26/2018, 03/27/2019     Objective   /72 (BP Location: Left arm, Patient Position: Sitting, Cuff Size: Standard)   Pulse 62   Temp (!) 97.3 °F (36.3 °C)   Ht 5' 3\" (1.6 m)   Wt 72.6 kg (160 lb)   SpO2 97%   BMI 28.34 kg/m²     Physical Exam  Vitals and nursing note reviewed.   Constitutional:       General: She is not in acute distress.     Appearance: She is well-developed.   HENT:      Head: Normocephalic and atraumatic.     Eyes:      Conjunctiva/sclera: Conjunctivae normal.       Cardiovascular:      Rate and Rhythm: Normal rate and regular rhythm.      Heart sounds: No murmur heard.  Pulmonary:      Effort: Pulmonary effort is normal. No respiratory distress.      Breath sounds: Normal breath sounds.   Abdominal:      Palpations: Abdomen is soft.      Tenderness: There is no abdominal tenderness.     Musculoskeletal:         General: No swelling.      Cervical back: Neck supple.     Skin:     General: Skin is warm and dry.      Capillary Refill: Capillary refill takes less than 2 seconds.     Neurological:      Mental Status: She is alert.     Psychiatric:         Mood and Affect: Mood normal.                [1]   Past Medical History:  Diagnosis Date    Allergic Young child    Allergic rhinitis     Functional disorder of bladder     GERD (gastroesophageal reflux disease)     Hypertension     Otitis media Child    Urinary tract infection " 1970's    Visual impairment 2019    Age related macular degeneration   [2]   Past Surgical History:  Procedure Laterality Date    HYSTERECTOMY  04/23/2019    total    TUBAL LIGATION  Not sure   [3]   Family History  Problem Relation Name Age of Onset    Breast cancer Maternal Aunt          approx 70 yrs of age     No Known Problems Mother      No Known Problems Father      No Known Problems Daughter      No Known Problems Maternal Grandmother      No Known Problems Maternal Grandfather      Emphysema Paternal Grandmother Elana     Arthritis Paternal Grandmother Elana     No Known Problems Paternal Grandfather      Breast cancer Paternal Aunt Roxanne     No Known Problems Paternal Aunt      COPD Son Elvis    [4]   Social History  Tobacco Use    Smoking status: Every Day     Current packs/day: 1.00     Average packs/day: 1 pack/day for 42.0 years (42.0 ttl pk-yrs)     Types: Cigarettes    Smokeless tobacco: Never   Vaping Use    Vaping status: Never Used   Substance and Sexual Activity    Alcohol use: Yes     Comment: Occasionally     Drug use: Never    Sexual activity: Yes     Partners: Male     Comment: Birth control pill, tubes tied   [5]   Current Outpatient Medications on File Prior to Visit   Medication Sig    CALCIUM CARBONATE-VITAMIN D PO Take 1 tablet by mouth in the morning and 1 tablet in the evening.    citalopram (CeleXA) 20 mg tablet TAKE 1 TABLET BY MOUTH EVERY DAY    losartan (COZAAR) 100 MG tablet TAKE 1 TABLET BY MOUTH EVERY DAY    meloxicam (MOBIC) 15 mg tablet Take 1 tablet (15 mg total) by mouth daily    Multiple Vitamins-Minerals (PRESERVISION AREDS 2 PO) Take by mouth    multivitamin (THERAGRAN) TABS Take 1 tablet by mouth in the morning.    omeprazole (PriLOSEC OTC) 20 MG tablet Take 20 mg by mouth in the morning.    psyllium (METAMUCIL) 58.6 % packet Take 1 packet by mouth in the morning and 1 packet in the evening and 1 packet before bedtime.    triamterene-hydrochlorothiazide (MAXZIDE) 75-50 MG  per tablet TAKE 1 TABLET BY MOUTH EVERY DAY    cyclobenzaprine (FLEXERIL) 10 mg tablet Take 1 tablet (10 mg total) by mouth 3 (three) times a day as needed for muscle spasms (Patient not taking: Reported on 6/11/2025)    [DISCONTINUED] sodium chloride (OCEAN) 0.65 % nasal spray 1 spray into each nostril as needed for congestion

## 2025-07-01 ENCOUNTER — EVALUATION (OUTPATIENT)
Dept: PHYSICAL THERAPY | Facility: CLINIC | Age: 70
End: 2025-07-01
Payer: MEDICARE

## 2025-07-01 DIAGNOSIS — M62.89 PELVIC FLOOR DYSFUNCTION: ICD-10-CM

## 2025-07-01 DIAGNOSIS — N39.46 MIXED STRESS AND URGE URINARY INCONTINENCE: Primary | ICD-10-CM

## 2025-07-01 PROCEDURE — 97535 SELF CARE MNGMENT TRAINING: CPT | Performed by: PHYSICAL THERAPIST

## 2025-07-01 PROCEDURE — 97162 PT EVAL MOD COMPLEX 30 MIN: CPT | Performed by: PHYSICAL THERAPIST

## 2025-07-01 NOTE — PROGRESS NOTES
PT Evaluation     Today's date: 2025  Patient name: Danita Davis  : 1955  MRN: [id number]  Referring provider: Goran Felipe*  Dx:   Encounter Diagnosis     ICD-10-CM    1. Mixed stress and urge urinary incontinence  N39.46       2. Pelvic floor dysfunction  M62.89                      Assessment  Impairments: abnormal coordination, abnormal muscle firing, activity intolerance, lacks appropriate home exercise program, pain with function, participation limitations, activity limitations and endurance    Assessment details: Patient is a 69 y.o female who presents to PFPT for pelvic floor dysfunction contributing to mixed incontinence. We discussed her bladder history at length and identifying bladder irritants and behaviors. Due to her current problems and length of time with symptoms, her response to PFPT may take several weeks to address symptoms. Discussed role of PFPT and how it may assist her symptoms and improve QOL. Pt would benefit from PFPT in order to address current impairments and address patient concerns. Thank you for your referral!        Goals  STG to be completed in 8 weeks:  1. Pt will be independent with initial home exercise program.   2. Pt will report at least 50% improvement in urinary urgency  3. Pt will complete bladder diary activity.    LTG to be completed in 12 weeks or upon discharge from PFPT:  1. Pt will be independent with advanced home exercise program for self-management of symptoms.  2. Pt will demonstrate ability to appropriately activate deep core muscles with functional mobility tasks.   3. Pt will report waking 2 or less times a night to urinate to allow them to have better sleep quality.       Plan    Planned therapy interventions: joint mobilization, neuromuscular re-education, patient/caregiver education, strengthening, stretching, therapeutic activities and therapeutic exercise    Frequency: 1x week  Duration in weeks: 6        PT Pelvic Floor Subjective:    History of Present Illness:   Patient notes worsening urgency over the past year as well as stress incontinence. She notes that she typically notices after working in her garden for a long period of time and then coming inside. At times, she notes that she has some control and others she will lose control of her bladder. Typically gets up multiple times a night, urinating every 2ish hours. Has attempted to perform Kegel exercises at home but notes that she has difficulty doing them due to weakness and feels that they may be ineffective  Social Support:     Work status: retired    Life stress severity: moderate  Diet and Exercise:    Diet:balanced nutrition, low carbohydrate intake and low sodium diet  OB/ gyn History    Gestational History:     Prior Pregnancy: Yes      Number of prior pregnancies: 2    Number of term pregnancies: 2    Delivery Type: vaginal delivery      Number of vaginal deliveries: 2    Delivery Complications:  7 lb, 5 lb  Bladder Function:     Voiding Difficulties positive for: urgency       Voiding Difficulties comments:     Urinary leakage aggravated by: sneezing, bending and laughing    Urinary leakage not aggravated by: hearing running water and seeing a toilet    Nocturia (episodes per night): 4    Fluid Intake Type:  Coffee, juice and water    Intake (ounces): Water: 60, Juice: 8 (Tomato),   Treatments:     Current treatment: physical therapy    Patient Goals:     Patient goals for therapy:  Improved quality of life, improved sleep, relaxation, fully empty bladder or bowels, improved bladder or bowel function and improved comfort      Objective           Precautions: Problem List[1]    POC Expiration: 8/1/25  Patient Education 7/1       PFM anatomy and function         Fluid Intake        Healthy bladder habits         Double void        Urge deferral                        Neuro Re-Ed                                                TherEx                Modalities                               [1]   Patient Active Problem List  Diagnosis    Essential hypertension    Hypercholesterolemia    Seasonal allergic rhinitis due to pollen    Generalized anxiety disorder    Stress incontinence of urine    Gastro-esophageal reflux disease with esophagitis    Dermatitis    Osteopenia of multiple sites

## 2025-07-01 NOTE — LETTER
2025    Goran Felipe MD  [address] [facility] [address]    Patient: Danita Davis   YOB: 1955   Date of Visit: 2025     Encounter Diagnosis     ICD-10-CM    1. Mixed stress and urge urinary incontinence  N39.46       2. Pelvic floor dysfunction  M62.89           Dear Dr. Goran Felipe MD:    Thank you for your recent referral of Danita Davis. Please review the attached evaluation summary from Danita's recent visit.     Please verify that you agree with the plan of care by signing the attached order.     If you have any questions or concerns, please do not hesitate to call.     I sincerely appreciate the opportunity to share in the care of one of your patients and hope to have another opportunity to work with you in the near future.       Sincerely,    Kisha Ramirez, PT      Referring Provider:      I certify that I have read the below Plan of Care and certify the need for these services furnished under this plan of treatment while under my care.                    Goran Felipe MD  [address] [facility] [address]  Via Fax: [phone]          PT Evaluation     Today's date: 2025  Patient name: Danita Davis  : 1955  MRN: [id number]  Referring provider: Goran Felipe*  Dx:   Encounter Diagnosis     ICD-10-CM    1. Mixed stress and urge urinary incontinence  N39.46       2. Pelvic floor dysfunction  M62.89                      Assessment  Impairments: abnormal coordination, abnormal muscle firing, activity intolerance, lacks appropriate home exercise program, pain with function, participation limitations, activity limitations and endurance    Assessment details: Patient is a 69 y.o female who presents to PFPT for pelvic floor dysfunction contributing to mixed incontinence. We discussed her bladder history at length and identifying bladder irritants and behaviors. Due to her current problems and length of time  with symptoms, her response to PFPT may take several weeks to address symptoms. Discussed role of PFPT and how it may assist her symptoms and improve QOL. Pt would benefit from PFPT in order to address current impairments and address patient concerns. Thank you for your referral!        Goals  STG to be completed in 8 weeks:  1. Pt will be independent with initial home exercise program.   2. Pt will report at least 50% improvement in urinary urgency  3. Pt will complete bladder diary activity.    LTG to be completed in 12 weeks or upon discharge from PFPT:  1. Pt will be independent with advanced home exercise program for self-management of symptoms.  2. Pt will demonstrate ability to appropriately activate deep core muscles with functional mobility tasks.   3. Pt will report waking 2 or less times a night to urinate to allow them to have better sleep quality.       Plan    Planned therapy interventions: joint mobilization, neuromuscular re-education, patient/caregiver education, strengthening, stretching, therapeutic activities and therapeutic exercise    Frequency: 1x week  Duration in weeks: 6        PT Pelvic Floor Subjective:   History of Present Illness:   Patient notes worsening urgency over the past year as well as stress incontinence. She notes that she typically notices after working in her garden for a long period of time and then coming inside. At times, she notes that she has some control and others she will lose control of her bladder. Typically gets up multiple times a night, urinating every 2ish hours. Has attempted to perform Kegel exercises at home but notes that she has difficulty doing them due to weakness and feels that they may be ineffective  Social Support:     Work status: retired    Life stress severity: moderate  Diet and Exercise:    Diet:balanced nutrition, low carbohydrate intake and low sodium diet  OB/ gyn History    Gestational History:     Prior Pregnancy: Yes      Number of prior  pregnancies: 2    Number of term pregnancies: 2    Delivery Type: vaginal delivery      Number of vaginal deliveries: 2    Delivery Complications:  7 lb, 5 lb  Bladder Function:     Voiding Difficulties positive for: urgency       Voiding Difficulties comments:     Urinary leakage aggravated by: sneezing, bending and laughing    Urinary leakage not aggravated by: hearing running water and seeing a toilet    Nocturia (episodes per night): 4    Fluid Intake Type:  Coffee, juice and water    Intake (ounces): Water: 60, Juice: 8 (Tomato),   Treatments:     Current treatment: physical therapy    Patient Goals:     Patient goals for therapy:  Improved quality of life, improved sleep, relaxation, fully empty bladder or bowels, improved bladder or bowel function and improved comfort      Objective           Precautions: Problem List[1]    POC Expiration: 8/1/25  Patient Education 7/1       PFM anatomy and function         Fluid Intake        Healthy bladder habits         Double void        Urge deferral                        Neuro Re-Ed                                                TherEx                Modalities                                              [1]  Patient Active Problem List  Diagnosis   • Essential hypertension   • Hypercholesterolemia   • Seasonal allergic rhinitis due to pollen   • Generalized anxiety disorder   • Stress incontinence of urine   • Gastro-esophageal reflux disease with esophagitis   • Dermatitis   • Osteopenia of multiple sites

## 2025-07-08 ENCOUNTER — OFFICE VISIT (OUTPATIENT)
Dept: PHYSICAL THERAPY | Facility: CLINIC | Age: 70
End: 2025-07-08
Attending: OBSTETRICS & GYNECOLOGY
Payer: MEDICARE

## 2025-07-08 DIAGNOSIS — M62.89 PELVIC FLOOR DYSFUNCTION: ICD-10-CM

## 2025-07-08 DIAGNOSIS — N39.46 MIXED STRESS AND URGE URINARY INCONTINENCE: Primary | ICD-10-CM

## 2025-07-08 PROCEDURE — 97530 THERAPEUTIC ACTIVITIES: CPT | Performed by: PHYSICAL THERAPIST

## 2025-07-08 PROCEDURE — 97112 NEUROMUSCULAR REEDUCATION: CPT | Performed by: PHYSICAL THERAPIST

## 2025-07-08 NOTE — HOME EXERCISE EDUCATION
Program_ID:[id number]   Access Code: [email or web address]  URL: [email or web address]  Date: 07-  Prepared By: Kisha Ramirez    Program Notes      Exercises      - Supine Lower Trunk Rotation - 2 x daily - 7 x weekly - 2 sets - 10 reps      - Supine Pelvic Floor Contraction - 2 x daily - 7 x weekly - 2 sets - 10 reps      - Supine Bridge with Pelvic Floor Contraction - 2 x daily - 7 x weekly - 2 sets - 10 reps      - Pelvic Floor Contractions in Hooklying with Adduction - 2 x daily - 7 x weekly - 2 sets - 10 reps      - Quick Flick Pelvic Floor Contractions in Hooklying - 2 x daily - 7 x weekly - 2 sets - 10 reps

## 2025-07-08 NOTE — PROGRESS NOTES
Daily Note     Today's date: 2025  Patient name: Danita Davis  : 1955  MRN: [id number]  Referring provider: Goran Felipe*  Dx:   Encounter Diagnosis     ICD-10-CM    1. Mixed stress and urge urinary incontinence  N39.46       2. Pelvic floor dysfunction  M62.89                      Subjective: Pt notes she has been attempting to incorporate urge deferral method daily      Objective: See treatment diary below      Assessment:  Pt tolerated treatment session well. Session interventions focusing on improving pelvic muscle coordination, strength and endurance as well as utilization of overflow. Reviewed urge deferral and HEP updated. Overall progressing towards goals. Pt would benefit from continued OP PT services in order to address ongoing impairments and improve functional activity limitations.       Plan: Continue per plan of care.      Precautions: Problem List[1]    POC Expiration: 25  Patient Education       PFM anatomy and function   GF      Urge deferral  GF                      Neuro Re-Ed        Pball LTR  2'      PFM contraction  1'      PFM with GS  1'      PFM c hip add/abd  1'      PFM quick flicks  1'              TherEx                Modalities                                 [1]   Patient Active Problem List  Diagnosis    Essential hypertension    Hypercholesterolemia    Seasonal allergic rhinitis due to pollen    Generalized anxiety disorder    Stress incontinence of urine    Gastro-esophageal reflux disease with esophagitis    Dermatitis    Osteopenia of multiple sites

## 2025-07-14 ENCOUNTER — OFFICE VISIT (OUTPATIENT)
Dept: PHYSICAL THERAPY | Facility: CLINIC | Age: 70
End: 2025-07-14
Attending: OBSTETRICS & GYNECOLOGY
Payer: MEDICARE

## 2025-07-14 DIAGNOSIS — M62.89 PELVIC FLOOR DYSFUNCTION: ICD-10-CM

## 2025-07-14 DIAGNOSIS — N39.46 MIXED STRESS AND URGE URINARY INCONTINENCE: Primary | ICD-10-CM

## 2025-07-14 PROCEDURE — 97112 NEUROMUSCULAR REEDUCATION: CPT | Performed by: PHYSICAL THERAPIST

## 2025-07-14 PROCEDURE — 97530 THERAPEUTIC ACTIVITIES: CPT | Performed by: PHYSICAL THERAPIST

## 2025-07-14 NOTE — PROGRESS NOTES
Daily Note     Today's date: 2025  Patient name: Danita Davis  : 1955  MRN: [id number]  Referring provider: Goran Felipe*  Dx:   Encounter Diagnosis     ICD-10-CM    1. Mixed stress and urge urinary incontinence  N39.46       2. Pelvic floor dysfunction  M62.89                      Subjective: Pt notes she is continuing to use deferral technique and perform HEP      Objective: See treatment diary below      Assessment:  Pt tolerated treatment session fairly well. Demonstrating improved ability to perform PFM contractions, will perform in AG positions next week. Overall progressing towards goals. Pt would benefit from continued OP PT services in order to address ongoing impairments and improve functional activity limitations.       Plan: Continue per plan of care.      Precautions: Problem List[1]    POC Expiration: 25  Patient Education      PFM anatomy and function   GF GF     Urge deferral  GF GF                     Neuro Re-Ed        Pball LTR  2' 2'     PFM contraction  1' NT *seated    PFM with GS  1' 1'x2     PFM c hip add/abd  1' 1'x2 ea *seated    PFM quick flicks  1' 1' *seated            TherEx                Modalities                                  [1]   Patient Active Problem List  Diagnosis    Essential hypertension    Hypercholesterolemia    Seasonal allergic rhinitis due to pollen    Generalized anxiety disorder    Stress incontinence of urine    Gastro-esophageal reflux disease with esophagitis    Dermatitis    Osteopenia of multiple sites

## 2025-07-16 ENCOUNTER — APPOINTMENT (OUTPATIENT)
Dept: PHYSICAL THERAPY | Facility: CLINIC | Age: 70
End: 2025-07-16
Attending: OBSTETRICS & GYNECOLOGY
Payer: MEDICARE

## 2025-07-23 ENCOUNTER — OFFICE VISIT (OUTPATIENT)
Dept: PHYSICAL THERAPY | Facility: CLINIC | Age: 70
End: 2025-07-23
Attending: OBSTETRICS & GYNECOLOGY
Payer: MEDICARE

## 2025-07-23 DIAGNOSIS — M62.89 PELVIC FLOOR DYSFUNCTION: ICD-10-CM

## 2025-07-23 DIAGNOSIS — N39.46 MIXED STRESS AND URGE URINARY INCONTINENCE: Primary | ICD-10-CM

## 2025-07-23 PROCEDURE — 97530 THERAPEUTIC ACTIVITIES: CPT | Performed by: PHYSICAL THERAPIST

## 2025-07-23 PROCEDURE — 97112 NEUROMUSCULAR REEDUCATION: CPT | Performed by: PHYSICAL THERAPIST

## 2025-07-23 NOTE — PROGRESS NOTES
Daily Note     Today's date: 2025  Patient name: Danita Davis  : 1955  MRN: [id number]  Referring provider: Goran Felipe*  Dx:   Encounter Diagnosis     ICD-10-CM    1. Mixed stress and urge urinary incontinence  N39.46       2. Pelvic floor dysfunction  M62.89                      Subjective: Pt reports continued episodes of strong urgency      Objective: See treatment diary below      Assessment:  Pt tolerated treatment session fairly well. Instructed in voiding diary to evaluate pattern of urgency/fluid intake. Challenged with seated exercises, provided adductor stretching HEP. Overall progressing towards goals. Pt would benefit from continued OP PT services in order to address ongoing impairments and improve functional activity limitations.      Plan: Continue per plan of care.      Precautions: Problem List[1]    POC Expiration: 25  Patient Education     PFM anatomy and function   GF GF GF    Urge deferral  GF GF GF                    Neuro Re-Ed        Pball LTR  2' 2' 2'    PFM contraction  1' NT     PFM with GS  1' 1'x2 1'x2    PFM c hip add/abd  1' 1'x2 ea *seated add 1'x2    PFM quick flicks  1' 1' *seated :30            TherEx                Modalities                                       [1]   Patient Active Problem List  Diagnosis    Essential hypertension    Hypercholesterolemia    Seasonal allergic rhinitis due to pollen    Generalized anxiety disorder    Stress incontinence of urine    Gastro-esophageal reflux disease with esophagitis    Dermatitis    Osteopenia of multiple sites

## 2025-07-30 ENCOUNTER — EVALUATION (OUTPATIENT)
Dept: PHYSICAL THERAPY | Facility: CLINIC | Age: 70
End: 2025-07-30
Attending: OBSTETRICS & GYNECOLOGY
Payer: MEDICARE

## 2025-07-30 DIAGNOSIS — M62.89 PELVIC FLOOR DYSFUNCTION: ICD-10-CM

## 2025-07-30 DIAGNOSIS — N39.46 MIXED STRESS AND URGE URINARY INCONTINENCE: Primary | ICD-10-CM

## 2025-07-30 PROCEDURE — 97112 NEUROMUSCULAR REEDUCATION: CPT | Performed by: PHYSICAL THERAPIST

## 2025-07-30 PROCEDURE — 97535 SELF CARE MNGMENT TRAINING: CPT | Performed by: PHYSICAL THERAPIST

## 2025-08-02 DIAGNOSIS — M25.561 CHRONIC PAIN OF BOTH KNEES: ICD-10-CM

## 2025-08-02 DIAGNOSIS — G89.29 CHRONIC PAIN OF BOTH KNEES: ICD-10-CM

## 2025-08-02 DIAGNOSIS — M25.562 CHRONIC PAIN OF BOTH KNEES: ICD-10-CM

## 2025-08-04 RX ORDER — MELOXICAM 15 MG/1
15 TABLET ORAL DAILY
Qty: 30 TABLET | Refills: 0 | Status: SHIPPED | OUTPATIENT
Start: 2025-08-04 | End: 2025-08-06 | Stop reason: SDUPTHER

## 2025-08-06 DIAGNOSIS — M25.562 CHRONIC PAIN OF BOTH KNEES: ICD-10-CM

## 2025-08-06 DIAGNOSIS — M25.561 CHRONIC PAIN OF BOTH KNEES: ICD-10-CM

## 2025-08-06 DIAGNOSIS — G89.29 CHRONIC PAIN OF BOTH KNEES: ICD-10-CM

## 2025-08-06 RX ORDER — MELOXICAM 15 MG/1
15 TABLET ORAL DAILY
Qty: 30 TABLET | Refills: 5 | Status: SHIPPED | OUTPATIENT
Start: 2025-08-06